# Patient Record
Sex: FEMALE | Race: WHITE | NOT HISPANIC OR LATINO | Employment: FULL TIME | URBAN - METROPOLITAN AREA
[De-identification: names, ages, dates, MRNs, and addresses within clinical notes are randomized per-mention and may not be internally consistent; named-entity substitution may affect disease eponyms.]

---

## 2017-05-30 ENCOUNTER — OFFICE VISIT (OUTPATIENT)
Dept: FAMILY MEDICINE CLINIC | Facility: CLINIC | Age: 42
End: 2017-05-30

## 2017-05-30 VITALS
HEART RATE: 84 BPM | DIASTOLIC BLOOD PRESSURE: 80 MMHG | OXYGEN SATURATION: 98 % | TEMPERATURE: 98.3 F | BODY MASS INDEX: 34.8 KG/M2 | SYSTOLIC BLOOD PRESSURE: 122 MMHG | HEIGHT: 69 IN | WEIGHT: 235 LBS

## 2017-05-30 DIAGNOSIS — F41.8 MIXED ANXIETY DEPRESSIVE DISORDER: Primary | ICD-10-CM

## 2017-05-30 DIAGNOSIS — R73.9 HYPERGLYCEMIA: ICD-10-CM

## 2017-05-30 PROCEDURE — 99213 OFFICE O/P EST LOW 20 MIN: CPT | Performed by: FAMILY MEDICINE

## 2017-05-30 RX ORDER — CITALOPRAM 40 MG/1
40 TABLET ORAL DAILY
Qty: 90 TABLET | Refills: 1 | Status: SHIPPED | OUTPATIENT
Start: 2017-05-30 | End: 2017-12-14 | Stop reason: SDUPTHER

## 2017-12-15 RX ORDER — CITALOPRAM 40 MG/1
TABLET ORAL
Qty: 30 TABLET | Refills: 0 | Status: SHIPPED | OUTPATIENT
Start: 2017-12-15 | End: 2017-12-19 | Stop reason: SDUPTHER

## 2017-12-19 ENCOUNTER — OFFICE VISIT (OUTPATIENT)
Dept: INTERNAL MEDICINE | Facility: CLINIC | Age: 42
End: 2017-12-19

## 2017-12-19 VITALS
HEIGHT: 69 IN | BODY MASS INDEX: 35.99 KG/M2 | WEIGHT: 243 LBS | OXYGEN SATURATION: 98 % | HEART RATE: 80 BPM | TEMPERATURE: 99 F | DIASTOLIC BLOOD PRESSURE: 80 MMHG | SYSTOLIC BLOOD PRESSURE: 118 MMHG

## 2017-12-19 DIAGNOSIS — R73.01 IFG (IMPAIRED FASTING GLUCOSE): ICD-10-CM

## 2017-12-19 DIAGNOSIS — J30.89 CHRONIC NON-SEASONAL ALLERGIC RHINITIS, UNSPECIFIED TRIGGER: ICD-10-CM

## 2017-12-19 DIAGNOSIS — F41.8 MIXED ANXIETY DEPRESSIVE DISORDER: ICD-10-CM

## 2017-12-19 DIAGNOSIS — E55.9 VITAMIN D DEFICIENCY: ICD-10-CM

## 2017-12-19 DIAGNOSIS — F41.8 MIXED ANXIETY DEPRESSIVE DISORDER: Primary | ICD-10-CM

## 2017-12-19 PROCEDURE — 99214 OFFICE O/P EST MOD 30 MIN: CPT | Performed by: FAMILY MEDICINE

## 2017-12-19 RX ORDER — MONTELUKAST SODIUM 10 MG/1
10 TABLET ORAL NIGHTLY
Qty: 90 TABLET | Refills: 1 | Status: SHIPPED | OUTPATIENT
Start: 2017-12-19 | End: 2018-08-14 | Stop reason: SDUPTHER

## 2017-12-19 RX ORDER — CITALOPRAM 40 MG/1
40 TABLET ORAL DAILY
Qty: 90 TABLET | Refills: 1 | Status: SHIPPED | OUTPATIENT
Start: 2017-12-19 | End: 2018-01-15 | Stop reason: SDUPTHER

## 2017-12-19 RX ORDER — FLUTICASONE PROPIONATE 50 MCG
2 SPRAY, SUSPENSION (ML) NASAL DAILY
Qty: 1 BOTTLE | Refills: 5 | Status: SHIPPED | OUTPATIENT
Start: 2017-12-19 | End: 2018-08-14 | Stop reason: SDUPTHER

## 2017-12-19 NOTE — PROGRESS NOTES
Subjective   Marcia Shi is a 42 y.o. female.   Chief Complaint   Patient presents with   • Depression   • Vitamin D Deficiency   • Hyperglycemia   • Allergic Rhinitis       History of Present Illness     #1 Depression with anxiety-on Celexa 40 mg a day.  Patient takes it everyday.  She has no side effects.  No suicidal ideations, no aggressive behaviors.  PHQ 9 at 6, NICKY 7 at 5.  We attempted weaning off 2 times in the last 3 years. Patient was not able to function.     #2 IFG- No regular exercise.  BMI 35.9.    #3 AR- patient is on Flonase and Zyrtec.  She takes it everyday as prescribed.  She reports no side effects.  No nosebleeds.  Prior to treatment of allergies she used to have at least 2 sinus infections a year. Symptoms are controlled most of the time, but over last few weeks she has postnasal drainage and nasal congestion on and off.     #4 vitamin D deficiency-patient is on vitamin D at 2000 units a day.  She takes it everyday.    The following portions of the patient's history were reviewed and updated as appropriate: allergies, current medications, past family history, past medical history, past social history, past surgical history and problem list.    Review of Systems   Constitutional: Negative.    HENT: Positive for postnasal drip and rhinorrhea.    Respiratory: Negative.    Cardiovascular: Negative.    Psychiatric/Behavioral: Negative.  Negative for suicidal ideas. The patient is not nervous/anxious.          Objective   Wt Readings from Last 3 Encounters:   12/19/17 110 kg (243 lb)   05/30/17 107 kg (235 lb)   12/12/16 108 kg (238 lb)      Vitals:    12/19/17 1321   BP: 118/80   Pulse: 80   Temp: 99 °F (37.2 °C)   SpO2: 98%     Temp Readings from Last 3 Encounters:   12/19/17 99 °F (37.2 °C)   05/30/17 98.3 °F (36.8 °C)   12/12/16 98.5 °F (36.9 °C)     BP Readings from Last 3 Encounters:   12/19/17 118/80   05/30/17 122/80   12/12/16 128/70     Pulse Readings from Last 3 Encounters:    12/19/17 80   05/30/17 84   12/12/16 81       Physical Exam   Constitutional: She is oriented to person, place, and time. She appears well-developed and well-nourished.   HENT:   Head: Normocephalic and atraumatic.   Right Ear: Tympanic membrane, external ear and ear canal normal.   Left Ear: Tympanic membrane, external ear and ear canal normal.   Nose: Rhinorrhea present.   Mouth/Throat: No posterior oropharyngeal erythema.   Neck: Neck supple. Carotid bruit is not present. No thyromegaly present.   Cardiovascular: Normal rate, regular rhythm and normal heart sounds.    Pulmonary/Chest: Effort normal and breath sounds normal.   Neurological: She is alert and oriented to person, place, and time.   Skin: Skin is warm, dry and intact.   Psychiatric: She has a normal mood and affect. Her behavior is normal.       Assessment/Plan   Marcia was seen today for depression, vitamin d deficiency, hyperglycemia and allergic rhinitis.    Diagnoses and all orders for this visit:    Mixed anxiety depressive disorder  -     Comprehensive Metabolic Panel; Future  -     Lipid Panel With LDL / HDL Ratio; Future  -     Thyroid Cascade Profile; Future  -     Vitamin D 25 Hydroxy; Future  -     Hemoglobin A1c; Future  -     CBC (No Diff); Future    Vitamin D deficiency  -     Comprehensive Metabolic Panel; Future  -     Lipid Panel With LDL / HDL Ratio; Future  -     Thyroid Cascade Profile; Future  -     Vitamin D 25 Hydroxy; Future  -     Hemoglobin A1c; Future  -     CBC (No Diff); Future    IFG (impaired fasting glucose)  -     Comprehensive Metabolic Panel; Future  -     Lipid Panel With LDL / HDL Ratio; Future  -     Thyroid Cascade Profile; Future  -     Vitamin D 25 Hydroxy; Future  -     Hemoglobin A1c; Future  -     CBC (No Diff); Future    Chronic non-seasonal allergic rhinitis, unspecified trigger    Other orders  -     fluticasone (FLONASE) 50 MCG/ACT nasal spray; 2 sprays into each nostril Daily.  -     citalopram  (CeleXA) 40 MG tablet; Take 1 tablet by mouth Daily.  -     montelukast (SINGULAIR) 10 MG tablet; Take 1 tablet by mouth Every Night.        #1 depression with anxiety-will continue current treatment.  Follow-up in 6 months, or sooner if problems.    #2 impaired fasting glucose-increased risk for developing diabetes.  Weight loss can help with decreasing risk.  We'll check A1c.  We talked about Weight Watchers and starting exercise at least 30 minutes a day, 5 days a week.  Patient will try to limit sweets in her diet.      #3 vitamin D deficiency-check levels.  Continue current treatment.  FOLLOW-up in 12 months.      #4 allergic rhinitis-uncontrolled.  We are adding singular 10 mg a day.  Follow-up in 6-12 months.

## 2017-12-21 LAB
25(OH)D3+25(OH)D2 SERPL-MCNC: 42.5 NG/ML (ref 30–100)
ALBUMIN SERPL-MCNC: 4.4 G/DL (ref 3.5–5.2)
ALBUMIN/GLOB SERPL: 1.5 G/DL
ALP SERPL-CCNC: 74 U/L (ref 39–117)
ALT SERPL-CCNC: 23 U/L (ref 1–33)
AST SERPL-CCNC: 22 U/L (ref 1–32)
BILIRUB SERPL-MCNC: 0.6 MG/DL (ref 0.1–1.2)
BUN SERPL-MCNC: 12 MG/DL (ref 6–20)
BUN/CREAT SERPL: 12.9 (ref 7–25)
CALCIUM SERPL-MCNC: 9.3 MG/DL (ref 8.6–10.5)
CHLORIDE SERPL-SCNC: 97 MMOL/L (ref 98–107)
CHOLEST SERPL-MCNC: 217 MG/DL (ref 0–200)
CO2 SERPL-SCNC: 30.2 MMOL/L (ref 22–29)
CREAT SERPL-MCNC: 0.93 MG/DL (ref 0.57–1)
ERYTHROCYTE [DISTWIDTH] IN BLOOD BY AUTOMATED COUNT: 14.1 % (ref 11.7–13)
GLOBULIN SER CALC-MCNC: 3 GM/DL
GLUCOSE SERPL-MCNC: 93 MG/DL (ref 65–99)
HBA1C MFR BLD: 5.48 % (ref 4.8–5.6)
HCT VFR BLD AUTO: 42.7 % (ref 35.6–45.5)
HDLC SERPL-MCNC: 49 MG/DL (ref 40–60)
HGB BLD-MCNC: 13.6 G/DL (ref 11.9–15.5)
LDLC SERPL CALC-MCNC: 139 MG/DL (ref 0–100)
LDLC/HDLC SERPL: 2.84 {RATIO}
MCH RBC QN AUTO: 29.6 PG (ref 26.9–32)
MCHC RBC AUTO-ENTMCNC: 31.9 G/DL (ref 32.4–36.3)
MCV RBC AUTO: 93 FL (ref 80.5–98.2)
PLATELET # BLD AUTO: 328 10*3/MM3 (ref 140–500)
POTASSIUM SERPL-SCNC: 4.6 MMOL/L (ref 3.5–5.2)
PROT SERPL-MCNC: 7.4 G/DL (ref 6–8.5)
RBC # BLD AUTO: 4.59 10*6/MM3 (ref 3.9–5.2)
SODIUM SERPL-SCNC: 137 MMOL/L (ref 136–145)
TRIGL SERPL-MCNC: 145 MG/DL (ref 0–150)
TSH SERPL DL<=0.005 MIU/L-ACNC: 2.15 UIU/ML (ref 0.45–4.5)
VLDLC SERPL CALC-MCNC: 29 MG/DL (ref 5–40)
WBC # BLD AUTO: 6.58 10*3/MM3 (ref 4.5–10.7)

## 2018-01-15 RX ORDER — CITALOPRAM 40 MG/1
40 TABLET ORAL DAILY
Qty: 90 TABLET | Refills: 1 | Status: SHIPPED | OUTPATIENT
Start: 2018-01-15 | End: 2018-08-14 | Stop reason: SDUPTHER

## 2018-08-14 ENCOUNTER — OFFICE VISIT (OUTPATIENT)
Dept: INTERNAL MEDICINE | Facility: CLINIC | Age: 43
End: 2018-08-14

## 2018-08-14 VITALS
HEART RATE: 82 BPM | OXYGEN SATURATION: 98 % | SYSTOLIC BLOOD PRESSURE: 120 MMHG | BODY MASS INDEX: 34.96 KG/M2 | WEIGHT: 236 LBS | HEIGHT: 69 IN | TEMPERATURE: 98 F | DIASTOLIC BLOOD PRESSURE: 80 MMHG

## 2018-08-14 DIAGNOSIS — E55.9 VITAMIN D DEFICIENCY: ICD-10-CM

## 2018-08-14 DIAGNOSIS — J30.89 CHRONIC NON-SEASONAL ALLERGIC RHINITIS, UNSPECIFIED TRIGGER: ICD-10-CM

## 2018-08-14 DIAGNOSIS — F41.8 MIXED ANXIETY DEPRESSIVE DISORDER: Primary | ICD-10-CM

## 2018-08-14 DIAGNOSIS — G47.09 OTHER INSOMNIA: ICD-10-CM

## 2018-08-14 PROCEDURE — 99214 OFFICE O/P EST MOD 30 MIN: CPT | Performed by: FAMILY MEDICINE

## 2018-08-14 RX ORDER — FLUTICASONE PROPIONATE 50 MCG
2 SPRAY, SUSPENSION (ML) NASAL DAILY
Qty: 1 BOTTLE | Refills: 5 | Status: SHIPPED | OUTPATIENT
Start: 2018-08-14 | End: 2019-02-08 | Stop reason: SDUPTHER

## 2018-08-14 RX ORDER — MONTELUKAST SODIUM 10 MG/1
10 TABLET ORAL NIGHTLY
Qty: 90 TABLET | Refills: 1 | Status: SHIPPED | OUTPATIENT
Start: 2018-08-14 | End: 2018-11-27

## 2018-08-14 RX ORDER — CITALOPRAM 40 MG/1
40 TABLET ORAL DAILY
Qty: 90 TABLET | Refills: 1 | Status: SHIPPED | OUTPATIENT
Start: 2018-08-14 | End: 2019-02-08 | Stop reason: SDUPTHER

## 2018-08-14 RX ORDER — TRAZODONE HYDROCHLORIDE 50 MG/1
50 TABLET ORAL NIGHTLY PRN
Qty: 90 TABLET | Refills: 1 | Status: SHIPPED | OUTPATIENT
Start: 2018-08-14 | End: 2018-11-19

## 2018-08-14 NOTE — PROGRESS NOTES
Subjective   Marcia Shi is a 43 y.o. female.   Chief Complaint   Patient presents with   • Depression   • Allergic Rhinitis   • Insomnia       History of Present Illness     #1 Depression with anxiety-on Celexa 40 mg a day.  Patient takes it everyday.  She has no side effects.  No suicidal ideations, no aggressive behaviors.  PHQ 9 at 5, NICKY 7 at 4.  We attempted weaning off 2 times in the last 3 years. Patient was not able to function.     #2 Insomnia -patient takes melatonin every night and it helps her to sleep.  She gets on average 8 hours of sleep but sometimes she wakes up tired in the mornings.  She feels that it doesn't work well.  She does not have interrupted sleep.  She would like to try trazodone.  Her mom was started on it recently and it works great for her.     #3 AR- patient is on Flonase, singular and Zyrtec.  She takes it everyday as prescribed.  She reports no side effects.  No nosebleeds.  Prior to treatment of allergies she used to have at least 2-3 sinus infections a year. Symptoms are controlled.         The following portions of the patient's history were reviewed and updated as appropriate: allergies, current medications, past family history, past medical history, past social history, past surgical history and problem list.    Review of Systems   Constitutional: Negative.    Respiratory: Negative.    Cardiovascular: Negative.    Psychiatric/Behavioral: Negative for suicidal ideas. The patient is not nervous/anxious.          Objective   Wt Readings from Last 3 Encounters:   08/14/18 107 kg (236 lb)   12/19/17 110 kg (243 lb)   05/30/17 107 kg (235 lb)      Vitals:    08/14/18 1008   BP: 120/80   Pulse: 82   Temp: 98 °F (36.7 °C)   SpO2: 98%     Temp Readings from Last 3 Encounters:   08/14/18 98 °F (36.7 °C)   12/19/17 99 °F (37.2 °C)   05/30/17 98.3 °F (36.8 °C)     BP Readings from Last 3 Encounters:   08/14/18 120/80   12/19/17 118/80   05/30/17 122/80     Pulse Readings from Last  3 Encounters:   08/14/18 82   12/19/17 80   05/30/17 84       Physical Exam   Constitutional: She is oriented to person, place, and time. She appears well-developed and well-nourished.   HENT:   Head: Normocephalic and atraumatic.   Neck: Neck supple. Carotid bruit is not present. No thyromegaly present.   Cardiovascular: Normal rate, regular rhythm and normal heart sounds.    Pulmonary/Chest: Effort normal and breath sounds normal.   Neurological: She is alert and oriented to person, place, and time.   Skin: Skin is warm, dry and intact.   Psychiatric: She has a normal mood and affect. Her behavior is normal.       Assessment/Plan   Marcia was seen today for depression, allergic rhinitis and insomnia.    Diagnoses and all orders for this visit:    Mixed anxiety depressive disorder    Vitamin D deficiency    Chronic non-seasonal allergic rhinitis, unspecified trigger    Other insomnia    Other orders  -     montelukast (SINGULAIR) 10 MG tablet; Take 1 tablet by mouth Every Night.  -     fluticasone (FLONASE) 50 MCG/ACT nasal spray; 2 sprays into the nostril(s) as directed by provider Daily.  -     citalopram (CeleXA) 40 MG tablet; Take 1 tablet by mouth Daily.  -     traZODone (DESYREL) 50 MG tablet; Take 1 tablet by mouth At Night As Needed for Sleep.        #1 anxiety/depression-controlled.  Continue current treatment.  Follow-up in 6 months.     #2 insomnia-we talked about sleep study, but patient prefers not to do it at this time.  She would like to try trazodone first.  Will send Trazodone at 50 mg at bedtime to be used as needed.  Patient will start with half tablet.  Follow-up in 6 months, or sooner if problems.      #3 allergic rhinitis-continue same.  Follow-up in 6-12 months.    She is overdue for Pap smear and mammogram is advised to schedule it with her GYN.

## 2018-11-14 LAB
25(OH)D3+25(OH)D2 SERPL-MCNC: 49.9 NG/ML (ref 30–100)
ALBUMIN SERPL-MCNC: 4.4 G/DL (ref 3.5–5.2)
ALBUMIN/GLOB SERPL: 1.6 G/DL
ALP SERPL-CCNC: 62 U/L (ref 39–117)
ALT SERPL-CCNC: 19 U/L (ref 1–33)
AST SERPL-CCNC: 14 U/L (ref 1–32)
BILIRUB SERPL-MCNC: 0.3 MG/DL (ref 0.1–1.2)
BUN SERPL-MCNC: 12 MG/DL (ref 6–20)
BUN/CREAT SERPL: 14.3 (ref 7–25)
CALCIUM SERPL-MCNC: 9.4 MG/DL (ref 8.6–10.5)
CHLORIDE SERPL-SCNC: 100 MMOL/L (ref 98–107)
CHOLEST SERPL-MCNC: 199 MG/DL (ref 0–200)
CO2 SERPL-SCNC: 23.4 MMOL/L (ref 22–29)
CREAT SERPL-MCNC: 0.84 MG/DL (ref 0.57–1)
ERYTHROCYTE [DISTWIDTH] IN BLOOD BY AUTOMATED COUNT: 13.9 % (ref 11.7–13)
GLOBULIN SER CALC-MCNC: 2.7 GM/DL
GLUCOSE SERPL-MCNC: 104 MG/DL (ref 65–99)
HCT VFR BLD AUTO: 41.5 % (ref 35.6–45.5)
HDLC SERPL-MCNC: 47 MG/DL (ref 40–60)
HGB BLD-MCNC: 13.1 G/DL (ref 11.9–15.5)
LDLC SERPL CALC-MCNC: 123 MG/DL (ref 0–100)
LDLC/HDLC SERPL: 2.63 {RATIO}
MCH RBC QN AUTO: 29 PG (ref 26.9–32)
MCHC RBC AUTO-ENTMCNC: 31.6 G/DL (ref 32.4–36.3)
MCV RBC AUTO: 92 FL (ref 80.5–98.2)
PLATELET # BLD AUTO: 311 10*3/MM3 (ref 140–500)
POTASSIUM SERPL-SCNC: 4.3 MMOL/L (ref 3.5–5.2)
PROT SERPL-MCNC: 7.1 G/DL (ref 6–8.5)
RBC # BLD AUTO: 4.51 10*6/MM3 (ref 3.9–5.2)
SODIUM SERPL-SCNC: 139 MMOL/L (ref 136–145)
TRIGL SERPL-MCNC: 143 MG/DL (ref 0–150)
TSH SERPL DL<=0.005 MIU/L-ACNC: 2.62 UIU/ML (ref 0.45–4.5)
VLDLC SERPL CALC-MCNC: 28.6 MG/DL (ref 5–40)
WBC # BLD AUTO: 6.18 10*3/MM3 (ref 4.5–10.7)

## 2018-11-19 ENCOUNTER — OFFICE VISIT (OUTPATIENT)
Dept: INTERNAL MEDICINE | Facility: CLINIC | Age: 43
End: 2018-11-19

## 2018-11-19 VITALS
OXYGEN SATURATION: 98 % | WEIGHT: 227 LBS | TEMPERATURE: 98.3 F | HEART RATE: 73 BPM | SYSTOLIC BLOOD PRESSURE: 118 MMHG | BODY MASS INDEX: 33.62 KG/M2 | DIASTOLIC BLOOD PRESSURE: 60 MMHG | HEIGHT: 69 IN

## 2018-11-19 DIAGNOSIS — Z00.00 PHYSICAL EXAM, ANNUAL: Primary | ICD-10-CM

## 2018-11-19 PROCEDURE — 90674 CCIIV4 VAC NO PRSV 0.5 ML IM: CPT | Performed by: FAMILY MEDICINE

## 2018-11-19 PROCEDURE — 90471 IMMUNIZATION ADMIN: CPT | Performed by: FAMILY MEDICINE

## 2018-11-19 PROCEDURE — 99396 PREV VISIT EST AGE 40-64: CPT | Performed by: FAMILY MEDICINE

## 2018-11-19 NOTE — PROGRESS NOTES
Subjective   Marcia Shi is a 43 y.o. female.   Chief Complaint   Patient presents with   • Annual Exam       History of Present Illness     #1 CPE-she is here for complete physical exam without GYN evaluation.  She is scheduled with GYN next week.   She has no complains.  There is no change in medical, surgical and family history from last office visit.  At last office visit we start trazodone 50 mg at bedtime to help with insomnia, but it didn't work.  The patient finds that melatonin is working much better.  She takes 5 mg of melatonin at bedtime.  She gets on average 8 hours of sleep.  Over-the-counter she takes vitamin D 3 at 2000 units a day.  No change.  She is not allergic to any medications.  She doesn't smoke cigarettes.  Alcohol a few times a year.  No drugs.  She exercises 3 times a week.  She goes for walks for at least an hour.  She improved her diet.  She eats no sweets during the week.  She lost 16 pounds.  Dental appointments every 6 months.  Last eye exam within a year.  Patient got reading glasses.  She had tetanus vaccine in 2010.  She is getting flu vaccine today.    The following portions of the patient's history were reviewed and updated as appropriate: allergies, current medications, past family history, past medical history, past social history, past surgical history and problem list.    Review of Systems   Constitutional: Negative.    HENT: Negative.    Respiratory: Negative.    Cardiovascular: Negative.    Gastrointestinal: Negative.    Neurological: Negative.    Psychiatric/Behavioral: Negative.          Objective   Wt Readings from Last 3 Encounters:   11/19/18 103 kg (227 lb)   08/14/18 107 kg (236 lb)   12/19/17 110 kg (243 lb)      Vitals:    11/19/18 0803   BP: 118/60   Pulse: 73   Temp: 98.3 °F (36.8 °C)   SpO2: 98%     Temp Readings from Last 3 Encounters:   11/19/18 98.3 °F (36.8 °C)   08/14/18 98 °F (36.7 °C)   12/19/17 99 °F (37.2 °C)     BP Readings from Last 3  Encounters:   11/19/18 118/60   08/14/18 120/80   12/19/17 118/80     Pulse Readings from Last 3 Encounters:   11/19/18 73   08/14/18 82   12/19/17 80       Physical Exam   Constitutional: She is oriented to person, place, and time. She appears well-developed and well-nourished. No distress.   HENT:   Head: Normocephalic and atraumatic. Hair is normal.   Right Ear: Hearing, tympanic membrane, external ear and ear canal normal. No drainage. No decreased hearing is noted.   Left Ear: Hearing, tympanic membrane, external ear and ear canal normal. No decreased hearing is noted.   Nose: No nasal deformity.   Mouth/Throat: Oropharynx is clear and moist.   Eyes: Conjunctivae, EOM and lids are normal. Pupils are equal, round, and reactive to light. Right eye exhibits no discharge. Left eye exhibits no discharge.   Neck: Normal range of motion. Neck supple. No JVD present. No tracheal deviation present. No thyromegaly present.   Cardiovascular: Normal rate, regular rhythm, normal heart sounds, intact distal pulses and normal pulses. Exam reveals no gallop and no friction rub.   No murmur heard.  Pulmonary/Chest: Effort normal and breath sounds normal. No respiratory distress. She has no wheezes. She has no rales. She exhibits no tenderness.   Abdominal: Soft. She exhibits no distension and no mass. There is no tenderness. There is no rebound and no guarding. No hernia.   Musculoskeletal: Normal range of motion. She exhibits no edema, tenderness or deformity.   Lymphadenopathy:     She has no cervical adenopathy.   Neurological: She is alert and oriented to person, place, and time. She has normal reflexes. She displays normal reflexes. No cranial nerve deficit. She exhibits normal muscle tone. Coordination normal.   Skin: Skin is warm and dry. No rash noted. She is not diaphoretic. No erythema.   Psychiatric: She has a normal mood and affect. Her behavior is normal. Judgment and thought content normal.   Vitals  reviewed.      Assessment/Plan   Marcia was seen today for annual exam.    Diagnoses and all orders for this visit:    Physical exam, annual        #1 CPE- patient is due for GYN evaluation and mammogram and she is scheduled next week.  She is up-to-date with vaccinations.  Blood work results reviewed with patient.  Fasting blood sugar 104.  Weight loss can decrease risk of developing diabetes.  Good job with dietary changes and regular walking.  Continue to work on it.

## 2018-11-27 ENCOUNTER — OFFICE VISIT (OUTPATIENT)
Dept: OBSTETRICS AND GYNECOLOGY | Facility: CLINIC | Age: 43
End: 2018-11-27

## 2018-11-27 VITALS
SYSTOLIC BLOOD PRESSURE: 126 MMHG | BODY MASS INDEX: 33.92 KG/M2 | HEART RATE: 90 BPM | HEIGHT: 69 IN | DIASTOLIC BLOOD PRESSURE: 75 MMHG | WEIGHT: 229 LBS

## 2018-11-27 DIAGNOSIS — Z01.419 ENCOUNTER FOR WELL WOMAN EXAM WITH ROUTINE GYNECOLOGICAL EXAM: Primary | ICD-10-CM

## 2018-11-27 PROCEDURE — 99386 PREV VISIT NEW AGE 40-64: CPT | Performed by: OBSTETRICS & GYNECOLOGY

## 2018-11-27 NOTE — PATIENT INSTRUCTIONS
Mammogram  If you received a mammogram recently, you should be called within 14 days of the appointment to discuss the results.  We contact all patients via phone if possible.  If we cannot reach you via phone, we will send a letter.  If you have not heard from us in 14 days from your appointment, please call us at [phone] or [phone] to get your results.      Pap smear  We will contact you with your pap smear results within 7 to 10 days.  If you have not heard your pap smear results within 14 days, please contact us at [phone] or [phone].    DXA (Bone density testing)  If you received a bone density test from our office or from another office and asked it to be sent to us, please note that we will contact you within 20 days of that imaging.  If you have not been contacted within 20 days, please contact us at [phone] or [phone].    All Laboratory tests  We want to make sure everyone receives their results and understands them.  If you have lab testing this visit, please contact us if you have not heard your results in 14 days at [phone] or [phone].  If you have MyChart and do not understand the results, please feel free to contact us, as well.      Requested medical records  If we have requested medical records, we will review them as soon as they are received, however we would recommend you call us at [phone] or [phone] in 14 days if you have not heard that your records have been received.

## 2018-11-27 NOTE — PROGRESS NOTES
SUBJECTIVE:   Chief Complaint   Patient presents with   • Annual Exam     no pap record.         Marcia Shi is a 43 y.o.  who presents for an annual examination     Pap history:  Last pap: Unsure  Prior abnormal paps: no  STDs  Sexually active: yes  History of STDs: no  Contraception:  None  Regular periods, 6 days of bleeding every 28 days, 2 days of heavy (changing a pad or tampon every 2 hours), after lighter  Had mammogram about 4 years ago (unsure where) with biopsy that was normal.    History of physical abuse: no, History of sexual abuse: no and History of verbal/emotional abuse: no    Screening for BRCA-   Is patient's family history significant for BRCA risk factors? no    Past Medical History:   Diagnosis Date   • Ganglion cyst of wrist    • UTI (urinary tract infection)    • Vitamin D deficiency      Past Surgical History:   Procedure Laterality Date   •  SECTION     • TONSILLECTOMY       OB History    Para Term  AB Living   1 1 1     1   SAB TAB Ectopic Molar Multiple Live Births             1      # Outcome Date GA Lbr José Miguel/2nd Weight Sex Delivery Anes PTL Lv   1 Term     F CS-LTranv   KASSIDY         Social History     Tobacco Use   • Smoking status: Former Smoker   • Smokeless tobacco: Never Used   • Tobacco comment: quit 15 yrs ago   Substance Use Topics   • Alcohol use: Yes     Comment: mildly   • Drug use: No     Family History   Problem Relation Age of Onset   • Hypertension Mother    • Colon cancer Father 70   • Lymphoma Father    • Breast cancer Neg Hx    • Ovarian cancer Neg Hx    • Uterine cancer Neg Hx    • Deep vein thrombosis Neg Hx    • Pulmonary embolism Neg Hx      Current Outpatient Medications on File Prior to Visit   Medication Sig Dispense Refill   • cetirizine (ZyrTEC) 10 MG tablet Take 10 mg by mouth daily.     • cholecalciferol (VITAMIN D3) 1000 UNITS tablet Take by mouth.     • citalopram (CeleXA) 40 MG tablet Take 1 tablet by mouth Daily. 90 tablet  "1   • CRANBERRY PO Take by mouth.     • fluticasone (FLONASE) 50 MCG/ACT nasal spray 2 sprays into the nostril(s) as directed by provider Daily. 1 bottle 5   • Melatonin 5 MG tablet dispersible Take by mouth.     • Multiple Vitamin (MULTIVITAMIN) capsule Take by mouth.     • [DISCONTINUED] montelukast (SINGULAIR) 10 MG tablet Take 1 tablet by mouth Every Night. 90 tablet 1     No current facility-administered medications on file prior to visit.      No Known Allergies     Review of Systems   Constitutional: Negative for chills, fever and unexpected weight change.   HENT: Negative for congestion, nosebleeds and sore throat.    Eyes: Negative for visual disturbance.   Respiratory: Negative for cough, chest tightness and shortness of breath.    Cardiovascular: Negative for chest pain and palpitations.   Gastrointestinal: Negative for abdominal pain, constipation, diarrhea, nausea and vomiting.   Endocrine: Negative for polyuria.   Genitourinary: Negative for difficulty urinating, dyspareunia, dysuria, frequency, genital sores, hematuria, menstrual problem, pelvic pain, urgency, vaginal bleeding, vaginal discharge and vaginal pain.   Musculoskeletal: Negative for arthralgias, joint swelling and myalgias.   Skin: Negative for color change and rash.   Neurological: Negative for dizziness, light-headedness and headaches.   Hematological: Does not bruise/bleed easily.   Psychiatric/Behavioral: Negative for dysphoric mood. The patient is not nervous/anxious.          OBJECTIVE:   Vitals:    11/27/18 0943   BP: 126/75   Pulse: 90   Weight: 104 kg (229 lb)   Height: 175.3 cm (69.02\")      Physical Exam   Constitutional: She is oriented to person, place, and time. She appears well-developed and well-nourished. No distress.   HENT:   Head: Normocephalic and atraumatic.   Eyes: EOM are normal. No scleral icterus.   Neck: Normal range of motion. Neck supple. No thyromegaly present.   Cardiovascular: Normal rate and regular rhythm. " Exam reveals no gallop and no friction rub.   No murmur heard.  Pulmonary/Chest: Effort normal and breath sounds normal. No respiratory distress. She has no wheezes. She has no rales. She exhibits no tenderness. Right breast exhibits no inverted nipple. Left breast exhibits no inverted nipple. Breasts are symmetrical. There is no breast swelling.   Abdominal: Soft. Bowel sounds are normal. She exhibits no distension. There is no tenderness. There is no guarding.   Genitourinary: Vagina normal and uterus normal. There is no rash, tenderness, lesion, injury or Bartholin's cyst on the right labia. There is no rash, tenderness, lesion, injury or Bartholin's cyst on the left labia. Uterus is not mobile.   Cervix is not parous. Cervix does not exhibit motion tenderness, discharge, friability, lesion, polyp, nabothian cyst, eversion, pinkness or cyanosis. Right adnexum displays no mass, no tenderness and no fullness. Right adnexum is present.Left adnexum displays no mass, no tenderness and no fullness. Left adnexum is present.No vaginal discharge found.   Musculoskeletal: She exhibits no edema or deformity.   Neurological: She is alert and oriented to person, place, and time.   Skin: Skin is warm and dry. She is not diaphoretic.   Hemangioma on left lower abdomen near mons   Psychiatric: She has a normal mood and affect. Her speech is normal and behavior is normal. Judgment and thought content normal. Cognition and memory are normal.       ASSESSMENT/PLAN:   1. Encounter for well woman exam with routine gynecological exam    - IGP, Apt HPV,rfx 16 / 18,45      Well woman counseling/screening:    Cervical cancer screening:    Reports no h/o cervical dysplasia   The patient is due for a pap today.    Screening guidelines discussed with patient  Breast cancer screening:    Clinical breast exam recommended for age 20-39 years every 1-3 years   Mammogram recommended starting age 40   Breast self awareness encouraged   Recommend  mammogram to be scheduled and call for results if she has not heard in 14 days  STD Screening   Patient desired no testing.    Contraception :   Declines    Family history    does not demonstrate need for genetics referral   Healthy lifestyle counseling:   return for routine annual checkups   Encouraged continued smoking cessation

## 2018-11-30 LAB
CYTOLOGIST CVX/VAG CYTO: NORMAL
CYTOLOGY CVX/VAG DOC THIN PREP: NORMAL
DX ICD CODE: NORMAL
HIV 1 & 2 AB SER-IMP: NORMAL
HPV I/H RISK 4 DNA CVX QL PROBE+SIG AMP: NEGATIVE
OTHER STN SPEC: NORMAL
PATH REPORT.FINAL DX SPEC: NORMAL
STAT OF ADQ CVX/VAG CYTO-IMP: NORMAL

## 2018-12-04 ENCOUNTER — PROCEDURE VISIT (OUTPATIENT)
Dept: OBSTETRICS AND GYNECOLOGY | Facility: CLINIC | Age: 43
End: 2018-12-04

## 2018-12-04 ENCOUNTER — APPOINTMENT (OUTPATIENT)
Dept: WOMENS IMAGING | Facility: HOSPITAL | Age: 43
End: 2018-12-04

## 2018-12-04 DIAGNOSIS — Z12.31 VISIT FOR SCREENING MAMMOGRAM: Primary | ICD-10-CM

## 2018-12-04 DIAGNOSIS — Z01.419 ENCOUNTER FOR WELL WOMAN EXAM WITH ROUTINE GYNECOLOGICAL EXAM: ICD-10-CM

## 2018-12-04 PROCEDURE — 77067 SCR MAMMO BI INCL CAD: CPT | Performed by: OBSTETRICS & GYNECOLOGY

## 2018-12-04 PROCEDURE — 77067 SCR MAMMO BI INCL CAD: CPT | Performed by: RADIOLOGY

## 2018-12-05 ENCOUNTER — TELEPHONE (OUTPATIENT)
Dept: OBSTETRICS AND GYNECOLOGY | Facility: CLINIC | Age: 43
End: 2018-12-05

## 2018-12-05 NOTE — TELEPHONE ENCOUNTER
----- Message from Imani Mason MD sent at 11/30/2018  5:48 PM EST -----  Please contact patient and let her know that her pap smear was normal and HPV testing was negative. Thanks!    12/05/18  -Called patient to inform results, no answer. Left a message to return call.    2nd attempt  Called patient to inform results, no answer. Left a message to return call.    Letter with normal pap results was mailed to patient.

## 2018-12-07 NOTE — TELEPHONE ENCOUNTER
Please let patient know that mammogram was also normal.  Breast tissue is heterogeneously dense which may obscure small masses.  Recommend follow-up mammogram in 1 year.

## 2019-02-08 ENCOUNTER — OFFICE VISIT (OUTPATIENT)
Dept: INTERNAL MEDICINE | Facility: CLINIC | Age: 44
End: 2019-02-08

## 2019-02-08 VITALS
DIASTOLIC BLOOD PRESSURE: 80 MMHG | SYSTOLIC BLOOD PRESSURE: 116 MMHG | TEMPERATURE: 98 F | HEIGHT: 69 IN | OXYGEN SATURATION: 97 % | WEIGHT: 230 LBS | BODY MASS INDEX: 34.07 KG/M2 | HEART RATE: 80 BPM

## 2019-02-08 DIAGNOSIS — F41.8 MIXED ANXIETY DEPRESSIVE DISORDER: Primary | ICD-10-CM

## 2019-02-08 DIAGNOSIS — J30.89 NON-SEASONAL ALLERGIC RHINITIS, UNSPECIFIED TRIGGER: ICD-10-CM

## 2019-02-08 PROCEDURE — 99213 OFFICE O/P EST LOW 20 MIN: CPT | Performed by: FAMILY MEDICINE

## 2019-02-08 RX ORDER — CITALOPRAM 40 MG/1
40 TABLET ORAL DAILY
Qty: 90 TABLET | Refills: 1 | Status: SHIPPED | OUTPATIENT
Start: 2019-02-08 | End: 2019-07-29 | Stop reason: SDUPTHER

## 2019-02-08 RX ORDER — FLUTICASONE PROPIONATE 50 MCG
2 SPRAY, SUSPENSION (ML) NASAL DAILY
Qty: 1 BOTTLE | Refills: 5 | Status: SHIPPED | OUTPATIENT
Start: 2019-02-08 | End: 2019-08-28 | Stop reason: SDUPTHER

## 2019-02-08 NOTE — PROGRESS NOTES
Subjective   Marcia Shi is a 44 y.o. female.   Chief Complaint   Patient presents with   • Allergic Rhinitis   • Depression       History of Present Illness     #1 Depression with anxiety-on Celexa 40 mg a day.  Patient takes it everyday.  She has no side effects. Mood is normal.  No excessive worries.  No suicidal ideations, no aggressive behaviors.  PHQ 9 at 2 NICKY 7 at 3.  We attempted weaning off 2 times in the last 3 years. Patient was not able to function.    #2 AR- patient is on Flonase and Zyrtec.  She takes it everyday as prescribed.  She reports no side effects.  No nosebleeds.  Symptoms are controlled.   Prior to treatment of allergies she used to have at least 2-3 sinus infections a year.       The following portions of the patient's history were reviewed and updated as appropriate: allergies, current medications, past family history, past medical history, past social history, past surgical history and problem list.    Review of Systems   Constitutional: Negative.    HENT: Negative for congestion and rhinorrhea.    Respiratory: Negative.    Cardiovascular: Negative.    Psychiatric/Behavioral: Negative for suicidal ideas.         Objective   Wt Readings from Last 3 Encounters:   02/08/19 104 kg (230 lb)   11/27/18 104 kg (229 lb)   11/19/18 103 kg (227 lb)      Vitals:    02/08/19 0845   BP: 116/80   Pulse: 80   Temp: 98 °F (36.7 °C)   SpO2: 97%     Temp Readings from Last 3 Encounters:   02/08/19 98 °F (36.7 °C)   11/19/18 98.3 °F (36.8 °C)   08/14/18 98 °F (36.7 °C)     BP Readings from Last 3 Encounters:   02/08/19 116/80   11/27/18 126/75   11/19/18 118/60     Pulse Readings from Last 3 Encounters:   02/08/19 80   11/27/18 90   11/19/18 73       Physical Exam   Constitutional: She is oriented to person, place, and time. She appears well-developed and well-nourished.   HENT:   Head: Normocephalic and atraumatic.   Neck: Neck supple. Carotid bruit is not present. No thyromegaly present.    Cardiovascular: Normal rate, regular rhythm and normal heart sounds.   Pulmonary/Chest: Effort normal and breath sounds normal.   Neurological: She is alert and oriented to person, place, and time.   Skin: Skin is warm, dry and intact.   Psychiatric: She has a normal mood and affect. Her behavior is normal.       Assessment/Plan   Marcia was seen today for allergic rhinitis and depression.    Diagnoses and all orders for this visit:    Mixed anxiety depressive disorder    Non-seasonal allergic rhinitis, unspecified trigger    Other orders  -     fluticasone (FLONASE) 50 MCG/ACT nasal spray; 2 sprays into the nostril(s) as directed by provider Daily.  -     citalopram (CeleXA) 40 MG tablet; Take 1 tablet by mouth Daily.        #1Depression/anxiety-controlled.  Continue same.  Follow-up in 6 months, or sooner if problems.    #2 allergic rhinitis-controlled.  Continue same.  Follow-up in 6-12 months.

## 2019-02-14 ENCOUNTER — RESULTS ENCOUNTER (OUTPATIENT)
Dept: INTERNAL MEDICINE | Facility: CLINIC | Age: 44
End: 2019-02-14

## 2019-02-14 DIAGNOSIS — F41.8 MIXED ANXIETY DEPRESSIVE DISORDER: ICD-10-CM

## 2019-02-14 DIAGNOSIS — E55.9 VITAMIN D DEFICIENCY: ICD-10-CM

## 2019-02-14 DIAGNOSIS — G47.09 OTHER INSOMNIA: ICD-10-CM

## 2019-04-11 RX ORDER — MONTELUKAST SODIUM 10 MG/1
10 TABLET ORAL NIGHTLY
Qty: 90 TABLET | Refills: 1 | Status: SHIPPED | OUTPATIENT
Start: 2019-04-11 | End: 2019-08-28 | Stop reason: SDUPTHER

## 2019-07-29 RX ORDER — CITALOPRAM 40 MG/1
40 TABLET ORAL DAILY
Qty: 90 TABLET | Refills: 0 | Status: SHIPPED | OUTPATIENT
Start: 2019-07-29 | End: 2019-08-28 | Stop reason: SDUPTHER

## 2019-08-28 ENCOUNTER — OFFICE VISIT (OUTPATIENT)
Dept: INTERNAL MEDICINE | Facility: CLINIC | Age: 44
End: 2019-08-28

## 2019-08-28 VITALS
WEIGHT: 240 LBS | TEMPERATURE: 98.4 F | OXYGEN SATURATION: 97 % | BODY MASS INDEX: 35.55 KG/M2 | DIASTOLIC BLOOD PRESSURE: 60 MMHG | HEART RATE: 82 BPM | HEIGHT: 69 IN | SYSTOLIC BLOOD PRESSURE: 110 MMHG

## 2019-08-28 DIAGNOSIS — G47.09 OTHER INSOMNIA: ICD-10-CM

## 2019-08-28 DIAGNOSIS — F41.9 ANXIETY AND DEPRESSION: Primary | ICD-10-CM

## 2019-08-28 DIAGNOSIS — J30.89 NON-SEASONAL ALLERGIC RHINITIS, UNSPECIFIED TRIGGER: ICD-10-CM

## 2019-08-28 DIAGNOSIS — F32.A ANXIETY AND DEPRESSION: Primary | ICD-10-CM

## 2019-08-28 PROCEDURE — 99213 OFFICE O/P EST LOW 20 MIN: CPT | Performed by: FAMILY MEDICINE

## 2019-08-28 RX ORDER — FLUTICASONE PROPIONATE 50 MCG
2 SPRAY, SUSPENSION (ML) NASAL DAILY
Qty: 1 BOTTLE | Refills: 5 | Status: SHIPPED | OUTPATIENT
Start: 2019-08-28 | End: 2020-11-25 | Stop reason: SDUPTHER

## 2019-08-28 RX ORDER — MONTELUKAST SODIUM 10 MG/1
10 TABLET ORAL NIGHTLY
Qty: 90 TABLET | Refills: 1 | Status: SHIPPED | OUTPATIENT
Start: 2019-08-28 | End: 2020-03-18

## 2019-08-28 RX ORDER — TRAZODONE HYDROCHLORIDE 50 MG/1
50 TABLET ORAL NIGHTLY
Qty: 90 TABLET | Refills: 1 | Status: SHIPPED | OUTPATIENT
Start: 2019-08-28 | End: 2020-05-04

## 2019-08-28 RX ORDER — CITALOPRAM 40 MG/1
40 TABLET ORAL DAILY
Qty: 90 TABLET | Refills: 1 | Status: SHIPPED | OUTPATIENT
Start: 2019-08-28 | End: 2020-03-20 | Stop reason: SDUPTHER

## 2019-11-16 LAB
25(OH)D3+25(OH)D2 SERPL-MCNC: 39.4 NG/ML (ref 30–100)
ALBUMIN SERPL-MCNC: 4.6 G/DL (ref 3.5–5.2)
ALBUMIN/GLOB SERPL: 1.8 G/DL
ALP SERPL-CCNC: 63 U/L (ref 39–117)
ALT SERPL-CCNC: 21 U/L (ref 1–33)
AST SERPL-CCNC: 21 U/L (ref 1–32)
BILIRUB SERPL-MCNC: 0.5 MG/DL (ref 0.2–1.2)
BUN SERPL-MCNC: 9 MG/DL (ref 6–20)
BUN/CREAT SERPL: 11.7 (ref 7–25)
CALCIUM SERPL-MCNC: 9.2 MG/DL (ref 8.6–10.5)
CHLORIDE SERPL-SCNC: 103 MMOL/L (ref 98–107)
CHOLEST SERPL-MCNC: 204 MG/DL (ref 0–200)
CO2 SERPL-SCNC: 22.7 MMOL/L (ref 22–29)
CREAT SERPL-MCNC: 0.77 MG/DL (ref 0.57–1)
ERYTHROCYTE [DISTWIDTH] IN BLOOD BY AUTOMATED COUNT: 12.7 % (ref 12.3–15.4)
GLOBULIN SER CALC-MCNC: 2.6 GM/DL
GLUCOSE SERPL-MCNC: 103 MG/DL (ref 65–99)
HCT VFR BLD AUTO: 38.3 % (ref 34–46.6)
HDLC SERPL-MCNC: 51 MG/DL (ref 40–60)
HGB BLD-MCNC: 13 G/DL (ref 12–15.9)
LDLC SERPL CALC-MCNC: 134 MG/DL (ref 0–100)
LDLC/HDLC SERPL: 2.64 {RATIO}
MCH RBC QN AUTO: 30.3 PG (ref 26.6–33)
MCHC RBC AUTO-ENTMCNC: 33.9 G/DL (ref 31.5–35.7)
MCV RBC AUTO: 89.3 FL (ref 79–97)
PLATELET # BLD AUTO: 311 10*3/MM3 (ref 140–450)
POTASSIUM SERPL-SCNC: 4.3 MMOL/L (ref 3.5–5.2)
PROT SERPL-MCNC: 7.2 G/DL (ref 6–8.5)
RBC # BLD AUTO: 4.29 10*6/MM3 (ref 3.77–5.28)
SODIUM SERPL-SCNC: 142 MMOL/L (ref 136–145)
TRIGL SERPL-MCNC: 93 MG/DL (ref 0–150)
TSH SERPL DL<=0.005 MIU/L-ACNC: 1.51 UIU/ML (ref 0.45–4.5)
VLDLC SERPL CALC-MCNC: 18.6 MG/DL (ref 5–40)
WBC # BLD AUTO: 7.21 10*3/MM3 (ref 3.4–10.8)

## 2019-11-22 ENCOUNTER — OFFICE VISIT (OUTPATIENT)
Dept: INTERNAL MEDICINE | Facility: CLINIC | Age: 44
End: 2019-11-22

## 2019-11-22 VITALS
WEIGHT: 234 LBS | TEMPERATURE: 98 F | HEIGHT: 69 IN | DIASTOLIC BLOOD PRESSURE: 72 MMHG | HEART RATE: 78 BPM | OXYGEN SATURATION: 97 % | BODY MASS INDEX: 34.66 KG/M2 | SYSTOLIC BLOOD PRESSURE: 120 MMHG

## 2019-11-22 DIAGNOSIS — Z00.00 PHYSICAL EXAM, ANNUAL: Primary | ICD-10-CM

## 2019-11-22 PROCEDURE — 99396 PREV VISIT EST AGE 40-64: CPT | Performed by: FAMILY MEDICINE

## 2019-11-22 PROCEDURE — 90471 IMMUNIZATION ADMIN: CPT | Performed by: FAMILY MEDICINE

## 2019-11-22 PROCEDURE — 90674 CCIIV4 VAC NO PRSV 0.5 ML IM: CPT | Performed by: FAMILY MEDICINE

## 2019-11-22 NOTE — PROGRESS NOTES
Subjective   Marcia Shi is a 44 y.o. female.   Chief Complaint   Patient presents with   • Annual Exam       History of Present Illness     #1 CPE- pt is here for complete physical exam without GYN evaluation.  She has no complaints.  There is no change in medical or surgical history from last office visit.  No change in medications including over-the-counter medications.  She is not allergic to medications.  Her mom was diagnosed with advanced endometrial cancer.  She is expecting chemotherapy.  Patient does not smoke cigarettes.  She drinks alcohol few times a year.  No drugs.  For exercise she walks twice a week for about an hour.  She has dental appointments every 6 months.  Her vision is normal.  She had GYN evaluation that included Pap smear, breast exam and mammogram in 11/2018.    Vitamin D is at 39.4.  Patient takes vitamin D3 2000 units a day.  Fasting blood sugar 103.  BMI 34.5.  Patient lost 6 pounds from last office visit by decreasing amount of sweets and eating more vegetables.      The following portions of the patient's history were reviewed and updated as appropriate: allergies, current medications, past family history, past medical history, past social history, past surgical history and problem list.    Review of Systems   Constitutional: Negative.    Respiratory: Negative.    Cardiovascular: Negative.    Gastrointestinal: Negative.    Genitourinary: Negative.    Neurological: Negative.    Psychiatric/Behavioral: Negative for suicidal ideas. The patient is nervous/anxious.          Objective   Wt Readings from Last 3 Encounters:   11/22/19 106 kg (234 lb)   08/28/19 109 kg (240 lb)   02/08/19 104 kg (230 lb)      Vitals:    11/22/19 0800   BP: 120/72   Pulse: 78   Temp: 98 °F (36.7 °C)   SpO2: 97%     Temp Readings from Last 3 Encounters:   11/22/19 98 °F (36.7 °C)   08/28/19 98.4 °F (36.9 °C)   02/08/19 98 °F (36.7 °C)     BP Readings from Last 3 Encounters:   11/22/19 120/72   08/28/19  110/60   02/08/19 116/80     Pulse Readings from Last 3 Encounters:   11/22/19 78   08/28/19 82   02/08/19 80     Body mass index is 34.54 kg/m².    Physical Exam   Constitutional: She is oriented to person, place, and time. She appears well-developed and well-nourished. No distress.   HENT:   Head: Normocephalic and atraumatic. Hair is normal.   Right Ear: Hearing, tympanic membrane, external ear and ear canal normal. No drainage. No decreased hearing is noted.   Left Ear: Hearing, tympanic membrane, external ear and ear canal normal. No decreased hearing is noted.   Nose: No nasal deformity.   Mouth/Throat: Oropharynx is clear and moist.   Eyes: Conjunctivae, EOM and lids are normal. Pupils are equal, round, and reactive to light. Right eye exhibits no discharge. Left eye exhibits no discharge.   Neck: Normal range of motion. Neck supple. No JVD present. No tracheal deviation present. No thyromegaly present.   Cardiovascular: Normal rate, regular rhythm, normal heart sounds, intact distal pulses and normal pulses. Exam reveals no gallop and no friction rub.   No murmur heard.  Pulmonary/Chest: Effort normal and breath sounds normal. No respiratory distress. She has no wheezes. She has no rales. She exhibits no tenderness.   Abdominal: Soft. She exhibits no distension and no mass. There is no tenderness. There is no rebound and no guarding. No hernia.   Musculoskeletal: Normal range of motion. She exhibits no edema, tenderness or deformity.   Lymphadenopathy:     She has no cervical adenopathy.   Neurological: She is alert and oriented to person, place, and time. She has normal reflexes. She displays normal reflexes. No cranial nerve deficit. She exhibits normal muscle tone. Coordination normal.   Skin: Skin is warm and dry. No rash noted. She is not diaphoretic. No erythema.   Psychiatric: She has a normal mood and affect. Her behavior is normal. Judgment and thought content normal.   Vitals  reviewed.      Assessment/Plan   Marcia was seen today for annual exam.    Diagnoses and all orders for this visit:    Physical exam, annual        #1 CPE-preventing counseling provided.  Obesity increases risk of developing diabetes and heart disease.  Continue to work on weight loss.  Increase exercise to at least 5 days a week, 30 minutes a day.  Patient is getting flu vaccine today.  She is up-to-date with cancer screening.  Information on diet to decrease cholesterol provided.  Continue regular dental appointments.  Sun protection discussed.  IFG-increased risk for developing diabetes.  Weight loss can decrease it.

## 2019-11-22 NOTE — PATIENT INSTRUCTIONS
"High Cholesterol    High cholesterol is a condition in which the blood has high levels of a white, waxy, fat-like substance (cholesterol). The human body needs small amounts of cholesterol. The liver makes all the cholesterol that the body needs. Extra (excess) cholesterol comes from the food that we eat.  Cholesterol is carried from the liver by the blood through the blood vessels. If you have high cholesterol, deposits (plaques) may build up on the walls of your blood vessels (arteries). Plaques make the arteries narrower and stiffer. Cholesterol plaques increase your risk for heart attack and stroke. Work with your health care provider to keep your cholesterol levels in a healthy range.  What increases the risk?  This condition is more likely to develop in people who:  · Eat foods that are high in animal fat (saturated fat) or cholesterol.  · Are overweight.  · Are not getting enough exercise.  · Have a family history of high cholesterol.  What are the signs or symptoms?  There are no symptoms of this condition.  How is this diagnosed?  This condition may be diagnosed from the results of a blood test.  · If you are older than age 20, your health care provider may check your cholesterol every 4-6 years.  · You may be checked more often if you already have high cholesterol or other risk factors for heart disease.  The blood test for cholesterol measures:  · \"Bad\" cholesterol (LDL cholesterol). This is the main type of cholesterol that causes heart disease. The desired level for LDL is less than 100.  · \"Good\" cholesterol (HDL cholesterol). This type helps to protect against heart disease by cleaning the arteries and carrying the LDL away. The desired level for HDL is 60 or higher.  · Triglycerides. These are fats that the body can store or burn for energy. The desired number for triglycerides is lower than 150.  · Total cholesterol. This is a measure of the total amount of cholesterol in your blood, including LDL " cholesterol, HDL cholesterol, and triglycerides. A healthy number is less than 200.  How is this treated?  This condition is treated with diet changes, lifestyle changes, and medicines.  Diet changes  · This may include eating more whole grains, fruits, vegetables, nuts, and fish.  · This may also include cutting back on red meat and foods that have a lot of added sugar.  Lifestyle changes  · Changes may include getting at least 40 minutes of aerobic exercise 3 times a week. Aerobic exercises include walking, biking, and swimming. Aerobic exercise along with a healthy diet can help you maintain a healthy weight.  · Changes may also include quitting smoking.  Medicines  · Medicines are usually given if diet and lifestyle changes have failed to reduce your cholesterol to healthy levels.  · Your health care provider may prescribe a statin medicine. Statin medicines have been shown to reduce cholesterol, which can reduce the risk of heart disease.  Follow these instructions at home:  Eating and drinking  If told by your health care provider:  · Eat chicken (without skin), fish, veal, shellfish, ground turkey breast, and round or loin cuts of red meat.  · Do not eat fried foods or fatty meats, such as hot dogs and salami.  · Eat plenty of fruits, such as apples.  · Eat plenty of vegetables, such as broccoli, potatoes, and carrots.  · Eat beans, peas, and lentils.  · Eat grains such as barley, rice, couscous, and bulgur wheat.  · Eat pasta without cream sauces.  · Use skim or nonfat milk, and eat low-fat or nonfat yogurt and cheeses.  · Do not eat or drink whole milk, cream, ice cream, egg yolks, or hard cheeses.  · Do not eat stick margarine or tub margarines that contain trans fats (also called partially hydrogenated oils).  · Do not eat saturated tropical oils, such as coconut oil and palm oil.  · Do not eat cakes, cookies, crackers, or other baked goods that contain trans fats.    General instructions  · Exercise as  directed by your health care provider. Increase your activity level with activities such as gardening, walking, and taking the stairs.  · Take over-the-counter and prescription medicines only as told by your health care provider.  · Do not use any products that contain nicotine or tobacco, such as cigarettes and e-cigarettes. If you need help quitting, ask your health care provider.  · Keep all follow-up visits as told by your health care provider. This is important.  Contact a health care provider if:  · You are struggling to maintain a healthy diet or weight.  · You need help to start on an exercise program.  · You need help to stop smoking.  Get help right away if:  · You have chest pain.  · You have trouble breathing.  This information is not intended to replace advice given to you by your health care provider. Make sure you discuss any questions you have with your health care provider.  Document Released: 12/18/2006 Document Revised: 07/15/2017 Document Reviewed: 06/17/2017  [employer] Interactive Patient Education © 2019 [employer]

## 2019-12-19 ENCOUNTER — APPOINTMENT (OUTPATIENT)
Dept: WOMENS IMAGING | Facility: HOSPITAL | Age: 44
End: 2019-12-19

## 2019-12-19 ENCOUNTER — PROCEDURE VISIT (OUTPATIENT)
Dept: OBSTETRICS AND GYNECOLOGY | Facility: CLINIC | Age: 44
End: 2019-12-19

## 2019-12-19 DIAGNOSIS — Z12.31 VISIT FOR SCREENING MAMMOGRAM: Primary | ICD-10-CM

## 2019-12-19 PROCEDURE — 77067 SCR MAMMO BI INCL CAD: CPT | Performed by: RADIOLOGY

## 2019-12-19 PROCEDURE — 77063 BREAST TOMOSYNTHESIS BI: CPT | Performed by: OBSTETRICS & GYNECOLOGY

## 2019-12-19 PROCEDURE — 77067 SCR MAMMO BI INCL CAD: CPT | Performed by: OBSTETRICS & GYNECOLOGY

## 2019-12-19 PROCEDURE — 77063 BREAST TOMOSYNTHESIS BI: CPT | Performed by: RADIOLOGY

## 2019-12-26 ENCOUNTER — TELEPHONE (OUTPATIENT)
Dept: OBSTETRICS AND GYNECOLOGY | Facility: CLINIC | Age: 44
End: 2019-12-26

## 2019-12-26 DIAGNOSIS — R92.8 ABNORMAL FINDING ON BREAST IMAGING: Primary | ICD-10-CM

## 2019-12-26 NOTE — TELEPHONE ENCOUNTER
Called patient to review mammogram.      Pt needs mammogram and ultrasound of left breast for follow up.  Orders placed.      No answer. Left general VM for call back tomorrow during office hours.

## 2019-12-31 NOTE — TELEPHONE ENCOUNTER
I spoke to her spouse today and he is calling to schedule.  They had received the letter in mail. I will follow up with [facility] next week.

## 2020-01-14 ENCOUNTER — APPOINTMENT (OUTPATIENT)
Dept: WOMENS IMAGING | Facility: HOSPITAL | Age: 45
End: 2020-01-14

## 2020-01-14 PROCEDURE — 77065 DX MAMMO INCL CAD UNI: CPT | Performed by: RADIOLOGY

## 2020-01-14 PROCEDURE — G0279 TOMOSYNTHESIS, MAMMO: HCPCS | Performed by: RADIOLOGY

## 2020-01-14 PROCEDURE — 76641 ULTRASOUND BREAST COMPLETE: CPT | Performed by: RADIOLOGY

## 2020-01-14 PROCEDURE — 77061 BREAST TOMOSYNTHESIS UNI: CPT | Performed by: RADIOLOGY

## 2020-01-15 ENCOUNTER — TELEPHONE (OUTPATIENT)
Dept: OBSTETRICS AND GYNECOLOGY | Facility: CLINIC | Age: 45
End: 2020-01-15

## 2020-01-15 DIAGNOSIS — R92.8 ABNORMAL FINDING ON BREAST IMAGING: ICD-10-CM

## 2020-01-15 DIAGNOSIS — N60.02 SOLITARY CYST OF LEFT BREAST: Primary | ICD-10-CM

## 2020-01-15 NOTE — TELEPHONE ENCOUNTER
Please let patient know that breast imaging overall appeared benign, but they recommend a repeat mammogram and ultrasound of left breast in six months. Thanks!

## 2020-01-15 NOTE — TELEPHONE ENCOUNTER
01/15/20  Called pt to both contact numbers on demographics via Polish  ID: [id number]. No answer, there was no voicemail available on mobile, left msg via  on home number.

## 2020-03-02 ENCOUNTER — OFFICE VISIT (OUTPATIENT)
Dept: RETAIL CLINIC | Facility: CLINIC | Age: 45
End: 2020-03-02

## 2020-03-02 VITALS
SYSTOLIC BLOOD PRESSURE: 112 MMHG | DIASTOLIC BLOOD PRESSURE: 78 MMHG | HEART RATE: 82 BPM | OXYGEN SATURATION: 98 % | RESPIRATION RATE: 18 BRPM | TEMPERATURE: 98.3 F

## 2020-03-02 DIAGNOSIS — N39.0 URINARY TRACT INFECTION WITH HEMATURIA, SITE UNSPECIFIED: Primary | ICD-10-CM

## 2020-03-02 DIAGNOSIS — R31.9 URINARY TRACT INFECTION WITH HEMATURIA, SITE UNSPECIFIED: Primary | ICD-10-CM

## 2020-03-02 LAB
BILIRUB BLD-MCNC: NEGATIVE MG/DL
CLARITY, POC: CLEAR
COLOR UR: ABNORMAL
GLUCOSE UR STRIP-MCNC: NEGATIVE MG/DL
KETONES UR QL: NEGATIVE
LEUKOCYTE EST, POC: ABNORMAL
NITRITE UR-MCNC: NEGATIVE MG/ML
PH UR: 7 [PH] (ref 5–8)
PROT UR STRIP-MCNC: ABNORMAL MG/DL
RBC # UR STRIP: ABNORMAL /UL
SP GR UR: 1.01 (ref 1–1.03)
UROBILINOGEN UR QL: NORMAL

## 2020-03-02 PROCEDURE — 99213 OFFICE O/P EST LOW 20 MIN: CPT | Performed by: NURSE PRACTITIONER

## 2020-03-02 PROCEDURE — 81003 URINALYSIS AUTO W/O SCOPE: CPT | Performed by: NURSE PRACTITIONER

## 2020-03-02 RX ORDER — SULFAMETHOXAZOLE AND TRIMETHOPRIM 800; 160 MG/1; MG/1
1 TABLET ORAL 2 TIMES DAILY
Qty: 14 TABLET | Refills: 0 | Status: SHIPPED | OUTPATIENT
Start: 2020-03-02 | End: 2020-03-09

## 2020-03-02 NOTE — PROGRESS NOTES
Subjective   Marcia Shi is a 45 y.o. female.     Urinary Tract Infection    This is a new problem. The current episode started today. The problem occurs every urination. The problem has been gradually worsening. The quality of the pain is described as burning. There has been no fever. She is sexually active. There is no history of pyelonephritis. Associated symptoms include frequency, hematuria and urgency. Pertinent negatives include no chills, flank pain, nausea, possible pregnancy or vomiting. Treatments tried: cranberry extract. The treatment provided no relief. Her past medical history is significant for recurrent UTIs.        The following portions of the patient's history were reviewed and updated as appropriate: allergies, current medications, past family history, past medical history, past social history, past surgical history and problem list.    Review of Systems   Constitutional: Negative for chills, fatigue and fever.   Respiratory: Negative.    Cardiovascular: Negative.    Gastrointestinal: Negative.  Negative for diarrhea, nausea and vomiting.   Genitourinary: Positive for dysuria, frequency, hematuria and urgency. Negative for flank pain.       Objective   Physical Exam   Constitutional: She is oriented to person, place, and time. She appears well-developed.   HENT:   Head: Normocephalic.   Cardiovascular: Normal rate, regular rhythm and normal heart sounds.   Pulmonary/Chest: Effort normal and breath sounds normal.   Abdominal: Soft. There is no tenderness. There is no CVA tenderness.   Neurological: She is alert and oriented to person, place, and time.   Skin: Skin is warm and dry.   Psychiatric: She has a normal mood and affect.     Vitals:    03/02/20 1641   BP: 112/78   Pulse: 82   Resp: 18   Temp: 98.3 °F (36.8 °C)   SpO2: 98%       Brief Urine Lab Results  (Last result in the past 365 days)      Color   Clarity   Blood   Leuk Est   Nitrite   Protein   CREAT   Urine HCG        03/02/20  1640 Red Clear Large Small (1+) Negative 100 mg/dL                 Assessment/Plan   Marcia was seen today for urinary tract infection.    Diagnoses and all orders for this visit:    Urinary tract infection with hematuria, site unspecified  -     POC Urinalysis Dipstick, Automated  -     Urine Culture - Urine, Urine, Clean Catch; Future  -     sulfamethoxazole-trimethoprim (BACTRIM DS,SEPTRA DS) 800-160 MG per tablet; Take 1 tablet by mouth 2 (Two) Times a Day for 7 days.      Urinary Tract Infection, Adult    A urinary tract infection (UTI) is an infection of any part of the urinary tract. The urinary tract includes the kidneys, ureters, bladder, and urethra. These organs make, store, and get rid of urine in the body.  Your health care provider may use other names to describe the infection. An upper UTI affects the ureters and kidneys (pyelonephritis). A lower UTI affects the bladder (cystitis) and urethra (urethritis).  What are the causes?  Most urinary tract infections are caused by bacteria in your genital area, around the entrance to your urinary tract (urethra). These bacteria grow and cause inflammation of your urinary tract.  What increases the risk?  You are more likely to develop this condition if:  · You have a urinary catheter that stays in place (indwelling).  · You are not able to control when you urinate or have a bowel movement (you have incontinence).  · You are female and you:  ? Use a spermicide or diaphragm for birth control.  ? Have low estrogen levels.  ? Are pregnant.  · You have certain genes that increase your risk (genetics).  · You are sexually active.  · You take antibiotic medicines.  · You have a condition that causes your flow of urine to slow down, such as:  ? An enlarged prostate, if you are male.  ? Blockage in your urethra (stricture).  ? A kidney stone.  ? A nerve condition that affects your bladder control (neurogenic bladder).  ? Not getting enough to drink, or not urinating  often.  · You have certain medical conditions, such as:  ? Diabetes.  ? A weak disease-fighting system (immunesystem).  ? Sickle cell disease.  ? Gout.  ? Spinal cord injury.  What are the signs or symptoms?  Symptoms of this condition include:  · Needing to urinate right away (urgently).  · Frequent urination or passing small amounts of urine frequently.  · Pain or burning with urination.  · Blood in the urine.  · Urine that smells bad or unusual.  · Trouble urinating.  · Cloudy urine.  · Vaginal discharge, if you are female.  · Pain in the abdomen or the lower back.  You may also have:  · Vomiting or a decreased appetite.  · Confusion.  · Irritability or tiredness.  · A fever.  · Diarrhea.  The first symptom in older adults may be confusion. In some cases, they may not have any symptoms until the infection has worsened.  How is this diagnosed?  This condition is diagnosed based on your medical history and a physical exam. You may also have other tests, including:  · Urine tests.  · Blood tests.  · Tests for sexually transmitted infections (STIs).  If you have had more than one UTI, a cystoscopy or imaging studies may be done to determine the cause of the infections.  How is this treated?  Treatment for this condition includes:  · Antibiotic medicine.  · Over-the-counter medicines to treat discomfort.  · Drinking enough water to stay hydrated.  If you have frequent infections or have other conditions such as a kidney stone, you may need to see a health care provider who specializes in the urinary tract (urologist).  In rare cases, urinary tract infections can cause sepsis. Sepsis is a life-threatening condition that occurs when the body responds to an infection. Sepsis is treated in the hospital with IV antibiotics, fluids, and other medicines.  Follow these instructions at home:    Medicines  · Take over-the-counter and prescription medicines only as told by your health care provider.  · If you were prescribed an  antibiotic medicine, take it as told by your health care provider. Do not stop using the antibiotic even if you start to feel better.  General instructions  · Make sure you:  ? Empty your bladder often and completely. Do not hold urine for long periods of time.  ? Empty your bladder after sex.  ? Wipe from front to back after a bowel movement if you are female. Use each tissue one time when you wipe.  · Drink enough fluid to keep your urine pale yellow.  · Keep all follow-up visits as told by your health care provider. This is important.  Contact a health care provider if:  · Your symptoms do not get better after 1-2 days.  · Your symptoms go away and then return.  Get help right away if you have:  · Severe pain in your back or your lower abdomen.  · A fever.  · Nausea or vomiting.  Summary  · A urinary tract infection (UTI) is an infection of any part of the urinary tract, which includes the kidneys, ureters, bladder, and urethra.  · Most urinary tract infections are caused by bacteria in your genital area, around the entrance to your urinary tract (urethra).  · Treatment for this condition often includes antibiotic medicines.  · If you were prescribed an antibiotic medicine, take it as told by your health care provider. Do not stop using the antibiotic even if you start to feel better.  · Keep all follow-up visits as told by your health care provider. This is important.  This information is not intended to replace advice given to you by your health care provider. Make sure you discuss any questions you have with your health care provider.  Document Released: 09/27/2006 Document Revised: 12/05/2019 Document Reviewed: 06/27/2019  [employer] Interactive Patient Education © 2020 [employer]

## 2020-03-02 NOTE — PATIENT INSTRUCTIONS
Urinary Tract Infection, Adult    A urinary tract infection (UTI) is an infection of any part of the urinary tract. The urinary tract includes the kidneys, ureters, bladder, and urethra. These organs make, store, and get rid of urine in the body.  Your health care provider may use other names to describe the infection. An upper UTI affects the ureters and kidneys (pyelonephritis). A lower UTI affects the bladder (cystitis) and urethra (urethritis).  What are the causes?  Most urinary tract infections are caused by bacteria in your genital area, around the entrance to your urinary tract (urethra). These bacteria grow and cause inflammation of your urinary tract.  What increases the risk?  You are more likely to develop this condition if:  · You have a urinary catheter that stays in place (indwelling).  · You are not able to control when you urinate or have a bowel movement (you have incontinence).  · You are female and you:  ? Use a spermicide or diaphragm for birth control.  ? Have low estrogen levels.  ? Are pregnant.  · You have certain genes that increase your risk (genetics).  · You are sexually active.  · You take antibiotic medicines.  · You have a condition that causes your flow of urine to slow down, such as:  ? An enlarged prostate, if you are male.  ? Blockage in your urethra (stricture).  ? A kidney stone.  ? A nerve condition that affects your bladder control (neurogenic bladder).  ? Not getting enough to drink, or not urinating often.  · You have certain medical conditions, such as:  ? Diabetes.  ? A weak disease-fighting system (immunesystem).  ? Sickle cell disease.  ? Gout.  ? Spinal cord injury.  What are the signs or symptoms?  Symptoms of this condition include:  · Needing to urinate right away (urgently).  · Frequent urination or passing small amounts of urine frequently.  · Pain or burning with urination.  · Blood in the urine.  · Urine that smells bad or unusual.  · Trouble urinating.  · Cloudy  urine.  · Vaginal discharge, if you are female.  · Pain in the abdomen or the lower back.  You may also have:  · Vomiting or a decreased appetite.  · Confusion.  · Irritability or tiredness.  · A fever.  · Diarrhea.  The first symptom in older adults may be confusion. In some cases, they may not have any symptoms until the infection has worsened.  How is this diagnosed?  This condition is diagnosed based on your medical history and a physical exam. You may also have other tests, including:  · Urine tests.  · Blood tests.  · Tests for sexually transmitted infections (STIs).  If you have had more than one UTI, a cystoscopy or imaging studies may be done to determine the cause of the infections.  How is this treated?  Treatment for this condition includes:  · Antibiotic medicine.  · Over-the-counter medicines to treat discomfort.  · Drinking enough water to stay hydrated.  If you have frequent infections or have other conditions such as a kidney stone, you may need to see a health care provider who specializes in the urinary tract (urologist).  In rare cases, urinary tract infections can cause sepsis. Sepsis is a life-threatening condition that occurs when the body responds to an infection. Sepsis is treated in the hospital with IV antibiotics, fluids, and other medicines.  Follow these instructions at home:    Medicines  · Take over-the-counter and prescription medicines only as told by your health care provider.  · If you were prescribed an antibiotic medicine, take it as told by your health care provider. Do not stop using the antibiotic even if you start to feel better.  General instructions  · Make sure you:  ? Empty your bladder often and completely. Do not hold urine for long periods of time.  ? Empty your bladder after sex.  ? Wipe from front to back after a bowel movement if you are female. Use each tissue one time when you wipe.  · Drink enough fluid to keep your urine pale yellow.  · Keep all follow-up  visits as told by your health care provider. This is important.  Contact a health care provider if:  · Your symptoms do not get better after 1-2 days.  · Your symptoms go away and then return.  Get help right away if you have:  · Severe pain in your back or your lower abdomen.  · A fever.  · Nausea or vomiting.  Summary  · A urinary tract infection (UTI) is an infection of any part of the urinary tract, which includes the kidneys, ureters, bladder, and urethra.  · Most urinary tract infections are caused by bacteria in your genital area, around the entrance to your urinary tract (urethra).  · Treatment for this condition often includes antibiotic medicines.  · If you were prescribed an antibiotic medicine, take it as told by your health care provider. Do not stop using the antibiotic even if you start to feel better.  · Keep all follow-up visits as told by your health care provider. This is important.  This information is not intended to replace advice given to you by your health care provider. Make sure you discuss any questions you have with your health care provider.  Document Released: 09/27/2006 Document Revised: 12/05/2019 Document Reviewed: 06/27/2019  [employer] Interactive Patient Education © 2020 [employer]

## 2020-03-04 ENCOUNTER — TELEPHONE (OUTPATIENT)
Dept: RETAIL CLINIC | Facility: CLINIC | Age: 45
End: 2020-03-04

## 2020-03-04 LAB
BACTERIA UR CULT: NORMAL
BACTERIA UR CULT: NORMAL

## 2020-03-04 NOTE — TELEPHONE ENCOUNTER
Adv of essentially negative urine culture result. Patient reports feeling better. Adv to f/u with pcp with any further issues.patient verbalizes understanding

## 2020-03-18 ENCOUNTER — OFFICE VISIT (OUTPATIENT)
Dept: OBSTETRICS AND GYNECOLOGY | Facility: CLINIC | Age: 45
End: 2020-03-18

## 2020-03-18 VITALS
HEART RATE: 73 BPM | BODY MASS INDEX: 35.84 KG/M2 | WEIGHT: 242 LBS | HEIGHT: 69 IN | SYSTOLIC BLOOD PRESSURE: 127 MMHG | DIASTOLIC BLOOD PRESSURE: 90 MMHG

## 2020-03-18 DIAGNOSIS — Z01.419 ENCOUNTER FOR WELL WOMAN EXAM WITH ROUTINE GYNECOLOGICAL EXAM: Primary | ICD-10-CM

## 2020-03-18 PROCEDURE — 99396 PREV VISIT EST AGE 40-64: CPT | Performed by: OBSTETRICS & GYNECOLOGY

## 2020-03-18 NOTE — PROGRESS NOTES
"SUBJECTIVE:   Chief Complaint   Patient presents with   • Annual Exam     18 NILM -HPV, last mammo: 19        Marcia Shi is a 45 y.o.  who presents for an annual examination     Pap history:  Last pap: 2018 NIL, HPV negative  Prior abnormal paps: no  STDs  Sexually active: yes  History of STDs: no  Contraception:  None  Still the same - regular periods, 6 days of bleeding every 28 days, 2 days of heavy (changing a pad or tampon every 2 hours), after lighter    Had mammogram Dec 2019 - multiple likely benign cysts    History of physical abuse: no, History of sexual abuse: no and History of verbal/emotional abuse: no    Screening for BRCA-   Is patient's family history significant for BRCA risk factors? No  Mother was just diagnosed with \"Cervical Cancer\" at age 79 in [place].  She is undergoing chemo    Past Medical History:   Diagnosis Date   • Ganglion cyst of wrist    • UTI (urinary tract infection)    • Vitamin D deficiency      Past Surgical History:   Procedure Laterality Date   •  SECTION     • TONSILLECTOMY       OB History    Para Term  AB Living   1 1 1     1   SAB TAB Ectopic Molar Multiple Live Births             1      # Outcome Date GA Lbr José Miguel/2nd Weight Sex Delivery Anes PTL Lv   1 Term     F CS-LTranv   KASSIDY      Social History     Tobacco Use   • Smoking status: Former Smoker   • Smokeless tobacco: Never Used   • Tobacco comment: quit 15 yrs ago   Substance Use Topics   • Alcohol use: Yes     Comment: mildly   • Drug use: No     Family History   Problem Relation Age of Onset   • Hypertension Mother    • Cervical cancer Mother 79   • Colon cancer Father 70   • Lymphoma Father    • Breast cancer Neg Hx    • Ovarian cancer Neg Hx    • Uterine cancer Neg Hx    • Deep vein thrombosis Neg Hx    • Pulmonary embolism Neg Hx      Current Outpatient Medications on File Prior to Visit   Medication Sig Dispense Refill   • cetirizine (ZyrTEC) 10 MG tablet " "Take 10 mg by mouth daily.     • cholecalciferol (VITAMIN D3) 1000 UNITS tablet Take by mouth.     • citalopram (CeleXA) 40 MG tablet Take 1 tablet by mouth Daily. 90 tablet 1   • CRANBERRY PO Take by mouth.     • fluticasone (FLONASE) 50 MCG/ACT nasal spray 2 sprays into the nostril(s) as directed by provider Daily. 1 bottle 5   • Melatonin 5 MG tablet dispersible Take by mouth.     • Multiple Vitamin (MULTIVITAMIN) capsule Take by mouth.     • traZODone (DESYREL) 50 MG tablet Take 1 tablet by mouth Every Night. 90 tablet 1   • [DISCONTINUED] montelukast (SINGULAIR) 10 MG tablet Take 1 tablet by mouth Every Night. 90 tablet 1     No current facility-administered medications on file prior to visit.      No Known Allergies     Review of Systems   Constitutional: Negative for chills, fever and unexpected weight change.   HENT: Negative for congestion, nosebleeds and sore throat.    Eyes: Negative for visual disturbance.   Respiratory: Negative for cough, chest tightness and shortness of breath.    Cardiovascular: Negative for chest pain and palpitations.   Gastrointestinal: Negative for abdominal pain, constipation, diarrhea, nausea and vomiting.   Endocrine: Negative for polyuria.   Genitourinary: Negative for difficulty urinating, dyspareunia, dysuria, frequency, genital sores, hematuria, menstrual problem, pelvic pain, urgency, vaginal bleeding, vaginal discharge and vaginal pain.   Musculoskeletal: Negative for arthralgias, joint swelling and myalgias.   Skin: Negative for color change and rash.   Neurological: Negative for dizziness, light-headedness and headaches.   Hematological: Does not bruise/bleed easily.   Psychiatric/Behavioral: Negative for dysphoric mood. The patient is not nervous/anxious.          OBJECTIVE:   Vitals:    03/18/20 1335   BP: 127/90   Pulse: 73   Weight: 110 kg (242 lb)   Height: 175.3 cm (69.02\")      Physical Exam   Constitutional: She is oriented to person, place, and time. She " appears well-developed and well-nourished. No distress.   HENT:   Head: Normocephalic and atraumatic.   Eyes: EOM are normal. No scleral icterus.   Neck: Normal range of motion. Neck supple. No thyromegaly present.   Cardiovascular: Normal rate and regular rhythm. Exam reveals no gallop and no friction rub.   No murmur heard.  Pulmonary/Chest: Effort normal and breath sounds normal. No respiratory distress. She has no wheezes. She has no rales. She exhibits no tenderness. Right breast exhibits no inverted nipple. Left breast exhibits no inverted nipple. No breast swelling, tenderness, discharge or bleeding. Breasts are symmetrical.   Abdominal: Soft. Bowel sounds are normal. She exhibits no distension. There is no tenderness. There is no guarding.   Genitourinary: Vagina normal and uterus normal. There is no rash, tenderness, lesion, injury or Bartholin's cyst on the right labia. There is no rash, tenderness, lesion, injury or Bartholin's cyst on the left labia. Uterus is not mobile.   Cervix is not parous. Cervix does not exhibit motion tenderness, discharge, friability, lesion, polyp, nabothian cyst, eversion, pinkness or cyanosis. Right adnexum displays no mass, no tenderness and no fullness. Right adnexum is present.Left adnexum displays no mass, no tenderness and no fullness. Left adnexum is present.No vaginal discharge found.   Musculoskeletal: She exhibits no edema or deformity.   Neurological: She is alert and oriented to person, place, and time.   Skin: Skin is warm and dry. She is not diaphoretic.   Psychiatric: She has a normal mood and affect. Her speech is normal and behavior is normal. Judgment and thought content normal. Cognition and memory are normal.       ASSESSMENT/PLAN:   1. Encounter for well woman exam with routine gynecological exam        Well woman counseling/screening:    Cervical cancer screening:    Reports no h/o cervical dysplasia   The patient is due for a pap in 2023, however patient  desires today due to change in family history. She was counseled that cervical cancer is unlikely to be genetic and more related to the HPV infection. She was counseled on routine screening guidelines, but still desired a pap smear. Aware of limitations of testing and possibility of out of pocket expense  Breast cancer screening:    Clinical breast exam recommended for age 20-39 years every 1-3 years   Mammogram recommended starting age 40   Breast self awareness encouraged   Recommend mammogram to be scheduled one year from previous  STD Screening   Patient desired no testing.    Contraception :   Declines    Family history    does not demonstrate need for genetics referral   Healthy lifestyle counseling:   return for routine annual checkups   Body mass index is 35.72 kg/m². Encouraged weight loss with dietary changes, exercise   Encouraged continued smoking cessation

## 2020-03-20 RX ORDER — CITALOPRAM 40 MG/1
40 TABLET ORAL DAILY
Qty: 30 TABLET | Refills: 0 | Status: SHIPPED | OUTPATIENT
Start: 2020-03-20 | End: 2020-05-04 | Stop reason: SDUPTHER

## 2020-03-23 LAB
CYTOLOGIST CVX/VAG CYTO: NORMAL
CYTOLOGY CVX/VAG DOC CYTO: NORMAL
CYTOLOGY CVX/VAG DOC THIN PREP: NORMAL
DX ICD CODE: NORMAL
HIV 1 & 2 AB SER-IMP: NORMAL
HPV I/H RISK 4 DNA CVX QL PROBE+SIG AMP: NEGATIVE
OTHER STN SPEC: NORMAL
STAT OF ADQ CVX/VAG CYTO-IMP: NORMAL

## 2020-04-13 ENCOUNTER — E-VISIT (OUTPATIENT)
Dept: FAMILY MEDICINE CLINIC | Facility: TELEHEALTH | Age: 45
End: 2020-04-13

## 2020-04-13 DIAGNOSIS — N39.0 URINARY TRACT INFECTION WITHOUT HEMATURIA, SITE UNSPECIFIED: Primary | ICD-10-CM

## 2020-04-13 PROCEDURE — 99213 OFFICE O/P EST LOW 20 MIN: CPT | Performed by: NURSE PRACTITIONER

## 2020-04-13 RX ORDER — NITROFURANTOIN 25; 75 MG/1; MG/1
100 CAPSULE ORAL 2 TIMES DAILY
Qty: 14 CAPSULE | Refills: 0 | Status: SHIPPED | OUTPATIENT
Start: 2020-04-13 | End: 2020-04-20

## 2020-04-13 RX ORDER — PHENAZOPYRIDINE HYDROCHLORIDE 100 MG/1
100 TABLET, FILM COATED ORAL 3 TIMES DAILY PRN
Qty: 6 TABLET | Refills: 0 | Status: SHIPPED | OUTPATIENT
Start: 2020-04-13 | End: 2020-04-15

## 2020-04-14 NOTE — PROGRESS NOTES
Subjective   Marcia Shi is a 45 y.o. female CC. Painful urination.       History of Present Illness  44 y/o female requesting Evisit for painful urination for a couple of days.     Review of Systems   ROS:  General:  Denies fever chills or weakness  Gastrointestinal:  Denies NVD, or pain  : Positive dysuria,and urgency  Musculoskeletal: denies back pain    Objective   Physical Exam: n/a      Assessment/Plan      Diagnosis Plan   1. Urinary tract infection without hematuria, site unspecified       New Medications Ordered This Visit   Medications   • nitrofurantoin, macrocrystal-monohydrate, (Macrobid) 100 MG capsule     Sig: Take 1 capsule by mouth 2 (Two) Times a Day for 7 days.     Dispense:  14 capsule     Refill:  0   • phenazopyridine (Pyridium) 100 MG tablet     Sig: Take 1 tablet by mouth 3 (Three) Times a Day As Needed for Bladder Spasms (and painful urination) for up to 2 days.     Dispense:  6 tablet     Refill:  0       Follow up with PCP in about 3 days (around 4/16/2020), or sooner if symptoms worsen or fail to improve.

## 2020-04-14 NOTE — PATIENT INSTRUCTIONS
Diagnosis Plan   1. Urinary tract infection without hematuria, site unspecified     Take all medication until gone. If symptoms persist call your PCP for follow up. We recommend that you have your blood pressure checked regularly.        Phenazopyridine tablets  What is this medicine?  PHENAZOPYRIDINE (fen az oh YAMEL redman) is a pain reliever. It is used to stop the pain, burning, or discomfort caused by infection or irritation of the urinary tract. This medicine is not an antibiotic. It will not cure a urinary tract infection.  This medicine may be used for other purposes; ask your health care provider or pharmacist if you have questions.  COMMON BRAND NAME(S): AZO, Azo-100, Azo-Gesic, Azo-Septic, Azo-Standard, Phenazo, Prodium, Pyridium, Urinary Analgesic, Uristat, Uristat Ultra  What should I tell my health care provider before I take this medicine?  They need to know if you have any of these conditions:  -glucose-6-phosphate dehydrogenase (G6PD) deficiency  -kidney disease  -an unusual or allergic reaction to phenazopyridine, other medicines, foods, dyes, or preservatives  -pregnant or trying to get pregnant  -breast-feeding  How should I use this medicine?  Take this medicine by mouth with a glass of water. Follow the directions on the prescription label. Take after meals. Take your doses at regular intervals. Do not take your medicine more often than directed. Do not skip doses or stop your medicine early even if you feel better. Do not stop taking except on your doctor's advice.  Talk to your pediatrician regarding the use of this medicine in children. Special care may be needed.  Overdosage: If you think you have taken too much of this medicine contact a poison control center or emergency room at once.  NOTE: This medicine is only for you. Do not share this medicine with others.  What if I miss a dose?  If you miss a dose, take it as soon as you can. If it is almost time for your next dose, take only that  dose. Do not take double or extra doses.  What may interact with this medicine?  Interactions are not expected.  This list may not describe all possible interactions. Give your health care provider a list of all the medicines, herbs, non-prescription drugs, or dietary supplements you use. Also tell them if you smoke, drink alcohol, or use illegal drugs. Some items may interact with your medicine.  What should I watch for while using this medicine?  Tell your doctor or health care professional if your symptoms do not improve or if they get worse.  This medicine colors body fluids red. This effect is harmless and will go away after you are done taking the medicine. It will change urine to an dark orange or red color. The red color may stain clothing. Soft contact lenses may become permanently stained. It is best not to wear soft contact lenses while taking this medicine.  If you are diabetic you may get a false positive result for sugar in your urine. Talk to your health care provider.  What side effects may I notice from receiving this medicine?  Side effects that you should report to your doctor or health care professional as soon as possible:  -allergic reactions like skin rash, itching or hives, swelling of the face, lips, or tongue  -blue or purple color of the skin  -difficulty breathing  -fever  -less urine  -unusual bleeding, bruising  -unusual tired, weak  -vomiting  -yellowing of the eyes or skin  Side effects that usually do not require medical attention (report to your doctor or health care professional if they continue or are bothersome):  -dark urine  -headache  -stomach upset  This list may not describe all possible side effects. Call your doctor for medical advice about side effects. You may report side effects to FDA at 3-610-FDA-2127.  Where should I keep my medicine?  Keep out of the reach of children.  Store at room temperature between 15 and 30 degrees C (59 and 86 degrees F). Protect from light  and moisture. Throw away any unused medicine after the expiration date.  NOTE: This sheet is a summary. It may not cover all possible information. If you have questions about this medicine, talk to your doctor, pharmacist, or health care provider.  © 2020 [employer] (2009-07-16 11:04:07)    Urinary Tract Infection, Adult  A urinary tract infection (UTI) is an infection of any part of the urinary tract. The urinary tract includes:  · The kidneys.  · The ureters.  · The bladder.  · The urethra.  These organs make, store, and get rid of pee (urine) in the body.  What are the causes?  This is caused by germs (bacteria) in your genital area. These germs grow and cause swelling (inflammation) of your urinary tract.  What increases the risk?  You are more likely to develop this condition if:  · You have a small, thin tube (catheter) to drain pee.  · You cannot control when you pee or poop (incontinence).  · You are female, and:  ? You use these methods to prevent pregnancy:  ? A medicine that kills sperm (spermicide).  ? A device that blocks sperm (diaphragm).  ? You have low levels of a female hormone (estrogen).  ? You are pregnant.  · You have genes that add to your risk.  · You are sexually active.  · You take antibiotic medicines.  · You have trouble peeing because of:  ? A prostate that is bigger than normal, if you are male.  ? A blockage in the part of your body that drains pee from the bladder (urethra).  ? A kidney stone.  ? A nerve condition that affects your bladder (neurogenic bladder).  ? Not getting enough to drink.  ? Not peeing often enough.  · You have other conditions, such as:  ? Diabetes.  ? A weak disease-fighting system (immune system).  ? Sickle cell disease.  ? Gout.  ? Injury of the spine.  What are the signs or symptoms?  Symptoms of this condition include:  · Needing to pee right away (urgently).  · Peeing often.  · Peeing small amounts often.  · Pain or burning when peeing.  · Blood  in the pee.  · Pee that smells bad or not like normal.  · Trouble peeing.  · Pee that is cloudy.  · Fluid coming from the vagina, if you are female.  · Pain in the belly or lower back.  Other symptoms include:  · Throwing up (vomiting).  · No urge to eat.  · Feeling mixed up (confused).  · Being tired and grouchy (irritable).  · A fever.  · Watery poop (diarrhea).  How is this treated?  This condition may be treated with:  · Antibiotic medicine.  · Other medicines.  · Drinking enough water.  Follow these instructions at home:    Medicines  · Take over-the-counter and prescription medicines only as told by your doctor.  · If you were prescribed an antibiotic medicine, take it as told by your doctor. Do not stop taking it even if you start to feel better.  General instructions  · Make sure you:  ? Pee until your bladder is empty.  ? Do not hold pee for a long time.  ? Empty your bladder after sex.  ? Wipe from front to back after pooping if you are a female. Use each tissue one time when you wipe.  · Drink enough fluid to keep your pee pale yellow.  · Keep all follow-up visits as told by your doctor. This is important.  Contact a doctor if:  · You do not get better after 1-2 days.  · Your symptoms go away and then come back.  Get help right away if:  · You have very bad back pain.  · You have very bad pain in your lower belly.  · You have a fever.  · You are sick to your stomach (nauseous).  · You are throwing up.  Summary  · A urinary tract infection (UTI) is an infection of any part of the urinary tract.  · This condition is caused by germs in your genital area.  · There are many risk factors for a UTI. These include having a small, thin tube to drain pee and not being able to control when you pee or poop.  · Treatment includes antibiotic medicines for germs.  · Drink enough fluid to keep your pee pale yellow.  This information is not intended to replace advice given to you by your health care provider. Make sure  you discuss any questions you have with your health care provider.  Document Released: 06/05/2009 Document Revised: 12/05/2019 Document Reviewed: 06/27/2019  [employer] Interactive Patient Education © 2020 [employer]

## 2020-05-04 RX ORDER — TRAZODONE HYDROCHLORIDE 50 MG/1
50 TABLET ORAL NIGHTLY
Qty: 30 TABLET | Refills: 0 | Status: SHIPPED | OUTPATIENT
Start: 2020-05-04 | End: 2020-08-17

## 2020-05-04 RX ORDER — TRAZODONE HYDROCHLORIDE 50 MG/1
TABLET ORAL
Qty: 90 TABLET | Refills: 0 | Status: SHIPPED | OUTPATIENT
Start: 2020-05-04 | End: 2020-05-04 | Stop reason: SDUPTHER

## 2020-05-04 RX ORDER — CITALOPRAM 40 MG/1
40 TABLET ORAL DAILY
Qty: 30 TABLET | Refills: 0 | Status: SHIPPED | OUTPATIENT
Start: 2020-05-04 | End: 2020-06-29

## 2020-06-01 RX ORDER — MONTELUKAST SODIUM 10 MG/1
TABLET ORAL
Qty: 90 TABLET | Refills: 0 | Status: SHIPPED | OUTPATIENT
Start: 2020-06-01 | End: 2020-08-17

## 2020-06-29 RX ORDER — CITALOPRAM 40 MG/1
TABLET ORAL
Qty: 30 TABLET | Refills: 0 | Status: SHIPPED | OUTPATIENT
Start: 2020-06-29 | End: 2020-08-17

## 2020-08-11 ENCOUNTER — APPOINTMENT (OUTPATIENT)
Dept: WOMENS IMAGING | Facility: HOSPITAL | Age: 45
End: 2020-08-11

## 2020-08-11 PROCEDURE — G0279 TOMOSYNTHESIS, MAMMO: HCPCS | Performed by: RADIOLOGY

## 2020-08-11 PROCEDURE — 76641 ULTRASOUND BREAST COMPLETE: CPT | Performed by: RADIOLOGY

## 2020-08-11 PROCEDURE — 77065 DX MAMMO INCL CAD UNI: CPT | Performed by: RADIOLOGY

## 2020-08-11 PROCEDURE — 77061 BREAST TOMOSYNTHESIS UNI: CPT | Performed by: RADIOLOGY

## 2020-08-12 DIAGNOSIS — N60.02 SOLITARY CYST OF LEFT BREAST: ICD-10-CM

## 2020-08-17 RX ORDER — MONTELUKAST SODIUM 10 MG/1
TABLET ORAL
Qty: 30 TABLET | Refills: 0 | Status: SHIPPED | OUTPATIENT
Start: 2020-08-17 | End: 2020-10-05

## 2020-08-17 RX ORDER — CITALOPRAM 40 MG/1
TABLET ORAL
Qty: 30 TABLET | Refills: 0 | Status: SHIPPED | OUTPATIENT
Start: 2020-08-17 | End: 2020-10-05

## 2020-08-17 RX ORDER — TRAZODONE HYDROCHLORIDE 50 MG/1
TABLET ORAL
Qty: 30 TABLET | Refills: 0 | Status: SHIPPED | OUTPATIENT
Start: 2020-08-17 | End: 2020-11-25 | Stop reason: SDUPTHER

## 2020-10-05 RX ORDER — MONTELUKAST SODIUM 10 MG/1
TABLET ORAL
Qty: 90 TABLET | Refills: 0 | Status: SHIPPED | OUTPATIENT
Start: 2020-10-05 | End: 2020-11-25 | Stop reason: SDUPTHER

## 2020-10-05 RX ORDER — CITALOPRAM 40 MG/1
TABLET ORAL
Qty: 30 TABLET | Refills: 0 | Status: SHIPPED | OUTPATIENT
Start: 2020-10-05 | End: 2020-11-25 | Stop reason: SDUPTHER

## 2020-11-18 DIAGNOSIS — Z00.00 PHYSICAL EXAM, ANNUAL: Primary | ICD-10-CM

## 2020-11-18 DIAGNOSIS — E55.9 VITAMIN D DEFICIENCY: ICD-10-CM

## 2020-11-19 LAB
25(OH)D3+25(OH)D2 SERPL-MCNC: 37.9 NG/ML (ref 30–100)
ALBUMIN SERPL-MCNC: 4.6 G/DL (ref 3.5–5.2)
ALBUMIN/GLOB SERPL: 2.2 G/DL
ALP SERPL-CCNC: 66 U/L (ref 39–117)
ALT SERPL-CCNC: 25 U/L (ref 1–33)
AST SERPL-CCNC: 24 U/L (ref 1–32)
BASOPHILS # BLD AUTO: 0.06 10*3/MM3 (ref 0–0.2)
BASOPHILS NFR BLD AUTO: 0.9 % (ref 0–1.5)
BILIRUB SERPL-MCNC: 0.4 MG/DL (ref 0–1.2)
BUN SERPL-MCNC: 12 MG/DL (ref 6–20)
BUN/CREAT SERPL: 14.3 (ref 7–25)
CALCIUM SERPL-MCNC: 9.4 MG/DL (ref 8.6–10.5)
CHLORIDE SERPL-SCNC: 97 MMOL/L (ref 98–107)
CHOLEST SERPL-MCNC: 227 MG/DL (ref 0–200)
CO2 SERPL-SCNC: 26 MMOL/L (ref 22–29)
CREAT SERPL-MCNC: 0.84 MG/DL (ref 0.57–1)
EOSINOPHIL # BLD AUTO: 0.11 10*3/MM3 (ref 0–0.4)
EOSINOPHIL NFR BLD AUTO: 1.6 % (ref 0.3–6.2)
ERYTHROCYTE [DISTWIDTH] IN BLOOD BY AUTOMATED COUNT: 13 % (ref 12.3–15.4)
GLOBULIN SER CALC-MCNC: 2.1 GM/DL
GLUCOSE SERPL-MCNC: 102 MG/DL (ref 65–99)
HCT VFR BLD AUTO: 41.3 % (ref 34–46.6)
HCV AB S/CO SERPL IA: 0.1 S/CO RATIO (ref 0–0.9)
HDLC SERPL-MCNC: 55 MG/DL (ref 40–60)
HGB BLD-MCNC: 13.5 G/DL (ref 12–15.9)
IMM GRANULOCYTES # BLD AUTO: 0.04 10*3/MM3 (ref 0–0.05)
IMM GRANULOCYTES NFR BLD AUTO: 0.6 % (ref 0–0.5)
LDLC SERPL CALC-MCNC: 148 MG/DL (ref 0–100)
LDLC/HDLC SERPL: 2.64 {RATIO}
LYMPHOCYTES # BLD AUTO: 1.59 10*3/MM3 (ref 0.7–3.1)
LYMPHOCYTES NFR BLD AUTO: 23.7 % (ref 19.6–45.3)
MCH RBC QN AUTO: 29.2 PG (ref 26.6–33)
MCHC RBC AUTO-ENTMCNC: 32.7 G/DL (ref 31.5–35.7)
MCV RBC AUTO: 89.4 FL (ref 79–97)
MONOCYTES # BLD AUTO: 0.42 10*3/MM3 (ref 0.1–0.9)
MONOCYTES NFR BLD AUTO: 6.3 % (ref 5–12)
NEUTROPHILS # BLD AUTO: 4.49 10*3/MM3 (ref 1.7–7)
NEUTROPHILS NFR BLD AUTO: 66.9 % (ref 42.7–76)
NRBC BLD AUTO-RTO: 0.1 /100 WBC (ref 0–0.2)
PLATELET # BLD AUTO: 324 10*3/MM3 (ref 140–450)
POTASSIUM SERPL-SCNC: 4.7 MMOL/L (ref 3.5–5.2)
PROT SERPL-MCNC: 6.7 G/DL (ref 6–8.5)
RBC # BLD AUTO: 4.62 10*6/MM3 (ref 3.77–5.28)
SODIUM SERPL-SCNC: 133 MMOL/L (ref 136–145)
TRIGL SERPL-MCNC: 133 MG/DL (ref 0–150)
TSH SERPL DL<=0.005 MIU/L-ACNC: 2.18 UIU/ML (ref 0.45–4.5)
VLDLC SERPL CALC-MCNC: 24 MG/DL (ref 5–40)
WBC # BLD AUTO: 6.71 10*3/MM3 (ref 3.4–10.8)

## 2020-11-25 ENCOUNTER — OFFICE VISIT (OUTPATIENT)
Dept: INTERNAL MEDICINE | Facility: CLINIC | Age: 45
End: 2020-11-25

## 2020-11-25 VITALS
DIASTOLIC BLOOD PRESSURE: 70 MMHG | BODY MASS INDEX: 35.25 KG/M2 | HEIGHT: 69 IN | TEMPERATURE: 98 F | HEART RATE: 105 BPM | OXYGEN SATURATION: 98 % | WEIGHT: 238 LBS | SYSTOLIC BLOOD PRESSURE: 106 MMHG

## 2020-11-25 DIAGNOSIS — E55.9 VITAMIN D DEFICIENCY: ICD-10-CM

## 2020-11-25 DIAGNOSIS — J30.89 NON-SEASONAL ALLERGIC RHINITIS, UNSPECIFIED TRIGGER: ICD-10-CM

## 2020-11-25 DIAGNOSIS — Z12.11 SCREENING FOR COLON CANCER: ICD-10-CM

## 2020-11-25 DIAGNOSIS — R73.01 IFG (IMPAIRED FASTING GLUCOSE): ICD-10-CM

## 2020-11-25 DIAGNOSIS — G47.09 OTHER INSOMNIA: ICD-10-CM

## 2020-11-25 DIAGNOSIS — Z00.00 PHYSICAL EXAM, ANNUAL: Primary | ICD-10-CM

## 2020-11-25 DIAGNOSIS — F41.8 MIXED ANXIETY DEPRESSIVE DISORDER: ICD-10-CM

## 2020-11-25 PROCEDURE — 90686 IIV4 VACC NO PRSV 0.5 ML IM: CPT | Performed by: FAMILY MEDICINE

## 2020-11-25 PROCEDURE — 99396 PREV VISIT EST AGE 40-64: CPT | Performed by: FAMILY MEDICINE

## 2020-11-25 PROCEDURE — 90472 IMMUNIZATION ADMIN EACH ADD: CPT | Performed by: FAMILY MEDICINE

## 2020-11-25 PROCEDURE — 99214 OFFICE O/P EST MOD 30 MIN: CPT | Performed by: FAMILY MEDICINE

## 2020-11-25 PROCEDURE — 90715 TDAP VACCINE 7 YRS/> IM: CPT | Performed by: FAMILY MEDICINE

## 2020-11-25 PROCEDURE — 90471 IMMUNIZATION ADMIN: CPT | Performed by: FAMILY MEDICINE

## 2020-11-25 RX ORDER — MONTELUKAST SODIUM 10 MG/1
10 TABLET ORAL NIGHTLY
Qty: 90 TABLET | Refills: 1 | Status: SHIPPED | OUTPATIENT
Start: 2020-11-25 | End: 2021-03-23

## 2020-11-25 RX ORDER — FLUTICASONE PROPIONATE 50 MCG
2 SPRAY, SUSPENSION (ML) NASAL DAILY
Qty: 1 BOTTLE | Refills: 5 | Status: SHIPPED | OUTPATIENT
Start: 2020-11-25 | End: 2021-06-01 | Stop reason: SDUPTHER

## 2020-11-25 RX ORDER — TRAZODONE HYDROCHLORIDE 50 MG/1
50 TABLET ORAL NIGHTLY
Qty: 90 TABLET | Refills: 1 | Status: SHIPPED | OUTPATIENT
Start: 2020-11-25 | End: 2021-06-01 | Stop reason: SDUPTHER

## 2020-11-25 RX ORDER — CITALOPRAM 40 MG/1
40 TABLET ORAL DAILY
Qty: 90 TABLET | Refills: 1 | Status: SHIPPED | OUTPATIENT
Start: 2020-11-25 | End: 2021-05-25

## 2020-11-25 NOTE — PATIENT INSTRUCTIONS
Exercising to Lose Weight  Exercise is structured, repetitive physical activity to improve fitness and health. Getting regular exercise is important for everyone. It is especially important if you are overweight. Being overweight increases your risk of heart disease, stroke, diabetes, high blood pressure, and several types of cancer. Reducing your calorie intake and exercising can help you lose weight.  Exercise is usually categorized as moderate or vigorous intensity. To lose weight, most people need to do a certain amount of moderate-intensity or vigorous-intensity exercise each week.  Moderate-intensity exercise    Moderate-intensity exercise is any activity that gets you moving enough to burn at least three times more energy (calories) than if you were sitting.  Examples of moderate exercise include:  · Walking a mile in 15 minutes.  · Doing light yard work.  · Biking at an easy pace.  Most people should get at least 150 minutes (2 hours and 30 minutes) a week of moderate-intensity exercise to maintain their body weight.  Vigorous-intensity exercise  Vigorous-intensity exercise is any activity that gets you moving enough to burn at least six times more calories than if you were sitting. When you exercise at this intensity, you should be working hard enough that you are not able to carry on a conversation.  Examples of vigorous exercise include:  · Running.  · Playing a team sport, such as football, basketball, and soccer.  · Jumping rope.  Most people should get at least 75 minutes (1 hour and 15 minutes) a week of vigorous-intensity exercise to maintain their body weight.  How can exercise affect me?  When you exercise enough to burn more calories than you eat, you lose weight. Exercise also reduces body fat and builds muscle. The more muscle you have, the more calories you burn. Exercise also:  · Improves mood.  · Reduces stress and tension.  · Improves your overall fitness, flexibility, and  endurance.  · Increases bone strength.  The amount of exercise you need to lose weight depends on:  · Your age.  · The type of exercise.  · Any health conditions you have.  · Your overall physical ability.  Talk to your health care provider about how much exercise you need and what types of activities are safe for you.  What actions can I take to lose weight?  Nutrition    · Make changes to your diet as told by your health care provider or diet and nutrition specialist (dietitian). This may include:  ? Eating fewer calories.  ? Eating more protein.  ? Eating less unhealthy fats.  ? Eating a diet that includes fresh fruits and vegetables, whole grains, low-fat dairy products, and lean protein.  ? Avoiding foods with added fat, salt, and sugar.  · Drink plenty of water while you exercise to prevent dehydration or heat stroke.  Activity  · Choose an activity that you enjoy and set realistic goals. Your health care provider can help you make an exercise plan that works for you.  · Exercise at a moderate or vigorous intensity most days of the week.  ? The intensity of exercise may vary from person to person. You can tell how intense a workout is for you by paying attention to your breathing and heartbeat. Most people will notice their breathing and heartbeat get faster with more intense exercise.  · Do resistance training twice each week, such as:  ? Push-ups.  ? Sit-ups.  ? Lifting weights.  ? Using resistance bands.  · Getting short amounts of exercise can be just as helpful as long structured periods of exercise. If you have trouble finding time to exercise, try to include exercise in your daily routine.  ? Get up, stretch, and walk around every 30 minutes throughout the day.  ? Go for a walk during your lunch break.  ? Park your car farther away from your destination.  ? If you take public transportation, get off one stop early and walk the rest of the way.  ? Make phone calls while standing up and walking  around.  ? Take the stairs instead of elevators or escalators.  · Wear comfortable clothes and shoes with good support.  · Do not exercise so much that you hurt yourself, feel dizzy, or get very short of breath.  Where to find more information  · [place] Department of Health and Human Services: www.hhs.gov  · Centers for Disease Control and Prevention (CDC): www.cdc.gov  Contact a health care provider:  · Before starting a new exercise program.  · If you have questions or concerns about your weight.  · If you have a medical problem that keeps you from exercising.  Get help right away if you have any of the following while exercising:  · Injury.  · Dizziness.  · Difficulty breathing or shortness of breath that does not go away when you stop exercising.  · Chest pain.  · Rapid heartbeat.  Summary  · Being overweight increases your risk of heart disease, stroke, diabetes, high blood pressure, and several types of cancer.  · Losing weight happens when you burn more calories than you eat.  · Reducing the amount of calories you eat in addition to getting regular moderate or vigorous exercise each week helps you lose weight.  This information is not intended to replace advice given to you by your health care provider. Make sure you discuss any questions you have with your health care provider.  Document Revised: 12/31/2018 Document Reviewed: 12/31/2018  [employer] Patient Education © 2020 [employer]  Calorie Counting for Weight Loss  Calories are units of energy. Your body needs a certain amount of calories from food to keep you going throughout the day. When you eat more calories than your body needs, your body stores the extra calories as fat. When you eat fewer calories than your body needs, your body burns fat to get the energy it needs.  Calorie counting means keeping track of how many calories you eat and drink each day. Calorie counting can be helpful if you need to lose weight. If you make sure to eat fewer calories  than your body needs, you should lose weight. Ask your health care provider what a healthy weight is for you.  For calorie counting to work, you will need to eat the right number of calories in a day in order to lose a healthy amount of weight per week. A dietitian can help you determine how many calories you need in a day and will give you suggestions on how to reach your calorie goal.  · A healthy amount of weight to lose per week is usually 1-2 lb (0.5-0.9 kg). This usually means that your daily calorie intake should be reduced by 500-750 calories.  · Eating 1,200 - 1,500 calories per day can help most women lose weight.  · Eating 1,500 - 1,800 calories per day can help most men lose weight.  What is my plan?  My goal is to have __________ calories per day.  If I have this many calories per day, I should lose around __________ pounds per week.  What do I need to know about calorie counting?  In order to meet your daily calorie goal, you will need to:  · Find out how many calories are in each food you would like to eat. Try to do this before you eat.  · Decide how much of the food you plan to eat.  · Write down what you ate and how many calories it had. Doing this is called keeping a food log.  To successfully lose weight, it is important to balance calorie counting with a healthy lifestyle that includes regular activity. Aim for 150 minutes of moderate exercise (such as walking) or 75 minutes of vigorous exercise (such as running) each week.  Where do I find calorie information?    The number of calories in a food can be found on a Nutrition Facts label. If a food does not have a Nutrition Facts label, try to look up the calories online or ask your dietitian for help.  Remember that calories are listed per serving. If you choose to have more than one serving of a food, you will have to multiply the calories per serving by the amount of servings you plan to eat. For example, the label on a package of bread might  "say that a serving size is 1 slice and that there are 90 calories in a serving. If you eat 1 slice, you will have eaten 90 calories. If you eat 2 slices, you will have eaten 180 calories.  How do I keep a food log?  Immediately after each meal, record the following information in your food log:  · What you ate. Don't forget to include toppings, sauces, and other extras on the food.  · How much you ate. This can be measured in cups, ounces, or number of items.  · How many calories each food and drink had.  · The total number of calories in the meal.  Keep your food log near you, such as in a small notebook in your pocket, or use a mobile bing or website. Some programs will calculate calories for you and show you how many calories you have left for the day to meet your goal.  What are some calorie counting tips?    · Use your calories on foods and drinks that will fill you up and not leave you hungry:  ? Some examples of foods that fill you up are nuts and nut butters, vegetables, lean proteins, and high-fiber foods like whole grains. High-fiber foods are foods with more than 5 g fiber per serving.  ? Drinks such as sodas, specialty coffee drinks, alcohol, and juices have a lot of calories, yet do not fill you up.  · Eat nutritious foods and avoid empty calories. Empty calories are calories you get from foods or beverages that do not have many vitamins or protein, such as candy, sweets, and soda. It is better to have a nutritious high-calorie food (such as an avocado) than a food with few nutrients (such as a bag of chips).  · Know how many calories are in the foods you eat most often. This will help you calculate calorie counts faster.  · Pay attention to calories in drinks. Low-calorie drinks include water and unsweetened drinks.  · Pay attention to nutrition labels for \"low fat\" or \"fat free\" foods. These foods sometimes have the same amount of calories or more calories than the full fat versions. They also often " have added sugar, starch, or salt, to make up for flavor that was removed with the fat.  · Find a way of tracking calories that works for you. Get creative. Try different apps or programs if writing down calories does not work for you.  What are some portion control tips?  · Know how many calories are in a serving. This will help you know how many servings of a certain food you can have.  · Use a measuring cup to measure serving sizes. You could also try weighing out portions on a kitchen scale. With time, you will be able to estimate serving sizes for some foods.  · Take some time to put servings of different foods on your favorite plates, bowls, and cups so you know what a serving looks like.  · Try not to eat straight from a bag or box. Doing this can lead to overeating. Put the amount you would like to eat in a cup or on a plate to make sure you are eating the right portion.  · Use smaller plates, glasses, and bowls to prevent overeating.  · Try not to multitask (for example, watch TV or use your computer) while eating. If it is time to eat, sit down at a table and enjoy your food. This will help you to know when you are full. It will also help you to be aware of what you are eating and how much you are eating.  What are tips for following this plan?  Reading food labels  · Check the calorie count compared to the serving size. The serving size may be smaller than what you are used to eating.  · Check the source of the calories. Make sure the food you are eating is high in vitamins and protein and low in saturated and trans fats.  Shopping  · Read nutrition labels while you shop. This will help you make healthy decisions before you decide to purchase your food.  · Make a grocery list and stick to it.  Cooking  · Try to cook your favorite foods in a healthier way. For example, try baking instead of frying.  · Use low-fat dairy products.  Meal planning  · Use more fruits and vegetables. Half of your plate should be  "fruits and vegetables.  · Include lean proteins like poultry and fish.  How do I count calories when eating out?  · Ask for smaller portion sizes.  · Consider sharing an entree and sides instead of getting your own entree.  · If you get your own entree, eat only half. Ask for a box at the beginning of your meal and put the rest of your entree in it so you are not tempted to eat it.  · If calories are listed on the menu, choose the lower calorie options.  · Choose dishes that include vegetables, fruits, whole grains, low-fat dairy products, and lean protein.  · Choose items that are boiled, broiled, grilled, or steamed. Stay away from items that are buttered, battered, fried, or served with cream sauce. Items labeled \"crispy\" are usually fried, unless stated otherwise.  · Choose water, low-fat milk, unsweetened iced tea, or other drinks without added sugar. If you want an alcoholic beverage, choose a lower calorie option such as a glass of wine or light beer.  · Ask for dressings, sauces, and syrups on the side. These are usually high in calories, so you should limit the amount you eat.  · If you want a salad, choose a garden salad and ask for grilled meats. Avoid extra toppings like gaytan, cheese, or fried items. Ask for the dressing on the side, or ask for olive oil and vinegar or lemon to use as dressing.  · Estimate how many servings of a food you are given. For example, a serving of cooked rice is ½ cup or about the size of half a baseball. Knowing serving sizes will help you be aware of how much food you are eating at restaurants. The list below tells you how big or small some common portion sizes are based on everyday objects:  ? 1 oz--4 stacked dice.  ? 3 oz--1 deck of cards.  ? 1 tsp--1 die.  ? 1 Tbsp--½ a ping-pong ball.  ? 2 Tbsp--1 ping-pong ball.  ? ½ cup--½ baseball.  ? 1 cup--1 baseball.  Summary  · Calorie counting means keeping track of how many calories you eat and drink each day. If you eat fewer " calories than your body needs, you should lose weight.  · A healthy amount of weight to lose per week is usually 1-2 lb (0.5-0.9 kg). This usually means reducing your daily calorie intake by 500-750 calories.  · The number of calories in a food can be found on a Nutrition Facts label. If a food does not have a Nutrition Facts label, try to look up the calories online or ask your dietitian for help.  · Use your calories on foods and drinks that will fill you up, and not on foods and drinks that will leave you hungry.  · Use smaller plates, glasses, and bowls to prevent overeating.  This information is not intended to replace advice given to you by your health care provider. Make sure you discuss any questions you have with your health care provider.  Document Revised: 09/06/2019 Document Reviewed: 11/17/2017  [employer] Patient Education © 2020 [employer]  Prediabetes Eating Plan  Prediabetes is a condition that causes blood sugar (glucose) levels to be higher than normal. This increases the risk for developing diabetes. In order to prevent diabetes from developing, your health care provider may recommend a diet and other lifestyle changes to help you:  · Control your blood glucose levels.  · Improve your cholesterol levels.  · Manage your blood pressure.  Your health care provider may recommend working with a diet and nutrition specialist (dietitian) to make a meal plan that is best for you.  What are tips for following this plan?  Lifestyle  · Set weight loss goals with the help of your health care team. It is recommended that most people with prediabetes lose 7% of their current body weight.  · Exercise for at least 30 minutes at least 5 days a week.  · Attend a support group or seek ongoing support from a mental health counselor.  · Take over-the-counter and prescription medicines only as told by your health care provider.  Reading food labels  · Read food labels to check the amount of fat, salt (sodium), and  sugar in prepackaged foods. Avoid foods that have:  ? Saturated fats.  ? Trans fats.  ? Added sugars.  · Avoid foods that have more than 300 milligrams (mg) of sodium per serving. Limit your daily sodium intake to less than 2,300 mg each day.  Shopping  · Avoid buying pre-made and processed foods.  Cooking  · Cook with olive oil. Do not use butter, lard, or ghee.  · Bake, broil, grill, or boil foods. Avoid frying.  Meal planning    · Work with your dietitian to develop an eating plan that is right for you. This may include:  ? Tracking how many calories you take in. Use a food diary, notebook, or mobile application to track what you eat at each meal.  ? Using the glycemic index (GI) to plan your meals. The index tells you how quickly a food will raise your blood glucose. Choose low-GI foods. These foods take a longer time to raise blood glucose.  · Consider following a Mediterranean diet. This diet includes:  ? Several servings each day of fresh fruits and vegetables.  ? Eating fish at least twice a week.  ? Several servings each day of whole grains, beans, nuts, and seeds.  ? Using olive oil instead of other fats.  ? Moderate alcohol consumption.  ? Eating small amounts of red meat and whole-fat dairy.  · If you have high blood pressure, you may need to limit your sodium intake or follow a diet such as the DASH eating plan. DASH is an eating plan that aims to lower high blood pressure.  What foods are recommended?  The items listed below may not be a complete list. Talk with your dietitian about what dietary choices are best for you.  Grains  Whole grains, such as whole-wheat or whole-grain breads, crackers, cereals, and pasta. Unsweetened oatmeal. Bulgur. Barley. Quinoa. Brown rice. Corn or whole-wheat flour tortillas or taco shells.  Vegetables  Lettuce. Spinach. Peas. Beets. Cauliflower. Cabbage. Broccoli. Carrots. Tomatoes. Squash. Eggplant. Herbs. Peppers. Onions. Cucumbers. [place] sprouts.  Fruits  Berries.  Bananas. Apples. Oranges. Grapes. Papaya. Joey. Pomegranate. Kiwi. Grapefruit. Cherries.  Meats and other protein foods  Seafood. Poultry without skin. Lean cuts of pork and beef. Tofu. Eggs. Nuts. Beans.  Dairy  Low-fat or fat-free dairy products, such as yogurt, cottage cheese, and cheese.  Beverages  Water. Tea. Coffee. Sugar-free or diet soda. [place] water. Lowfat or no-fat milk. Milk alternatives, such as soy or almond milk.  Fats and oils  Olive oil. Canola oil. Sunflower oil. Grapeseed oil. Avocado. Walnuts.  Sweets and desserts  Sugar-free or low-fat pudding. Sugar-free or low-fat ice cream and other frozen treats.  Seasoning and other foods  Herbs. Sodium-free spices. Mustard. Relish. Low-fat, low-sugar ketchup. Low-fat, low-sugar barbecue sauce. Low-fat or fat-free mayonnaise.  What foods are not recommended?  The items listed below may not be a complete list. Talk with your dietitian about what dietary choices are best for you.  Grains  Refined white flour and flour products, such as bread, pasta, snack foods, and cereals.  Vegetables  Canned vegetables. Frozen vegetables with butter or cream sauce.  Fruits  Fruits canned with syrup.  Meats and other protein foods  Fatty cuts of meat. Poultry with skin. Breaded or fried meat. Processed meats.  Dairy  Full-fat yogurt, cheese, or milk.  Beverages  Sweetened drinks, such as sweet iced tea and soda.  Fats and oils  Butter. Lard. Ghee.  Sweets and desserts  Baked goods, such as cake, cupcakes, pastries, cookies, and cheesecake.  Seasoning and other foods  Spice mixes with added salt. Ketchup. Barbecue sauce. Mayonnaise.  Summary  · To prevent diabetes from developing, you may need to make diet and other lifestyle changes to help control blood sugar, improve cholesterol levels, and manage your blood pressure.  · Set weight loss goals with the help of your health care team. It is recommended that most people with prediabetes lose 7 percent of their current  body weight.  · Consider following a Mediterranean diet that includes plenty of fresh fruits and vegetables, whole grains, beans, nuts, seeds, fish, lean meat, low-fat dairy, and healthy oils.  This information is not intended to replace advice given to you by your health care provider. Make sure you discuss any questions you have with your health care provider.  Document Revised: 04/10/2020 Document Reviewed: 02/21/2018  [employer] Patient Education © 2020 [employer]

## 2020-11-25 NOTE — PROGRESS NOTES
Subjective   Marcia Shi is a 45 y.o. female.   Chief Complaint   Patient presents with   • Depression   • Insomnia   • Annual Exam   • Vitamin D Deficiency   • Allergic Rhinitis       History of Present Illness     #1 CPE-patient is here for complete physical exam without GYN evaluation.  She also would like to follow-up on depression anxiety, insomnia, allergic rhinitis and vitamin D deficiency.  She has no complaints.  There is no change in medical or surgical history from last office visit.  There is no change in prescription medications.  Over-the-counter she takes multivitamin, vitamin D, cranberry tablets and melatonin.  She is not allergic to any medications.  She does not use tobacco products, she drinks on average 3 drinks a year, no drugs.  No regular exercise.  She has dental appointments every 6 months.  Last eye exam was in .  She uses [facility].  She saw her GYN in 2020.  She had Pap smear, pelvic exam and breast exam.  She is getting mammogram and breast ultrasound every 6 months and it is managed by her GYN.  Last was in 2020.  She had tetanus vaccine in .  She wants to get flu vaccine today.      #2 Depression with anxiety-on Celexa 40 mg a day.  Patient takes it everyday.  She has no side effects.    Her mother  in summer from cancer.  It was very difficult for patient.  Medication helped her.  She is doing better.  She is tired of Covid.  PHQ-9 at 10, NICKY-7 at 10.  She is comfortable at current dose of citalopram.    We attempted weaning off citalopram 2 times in the last 3 years. Patient was not able to function.    #3 Insomnia-she is on trazodone at 50 mg at bedtime and melatonin at 10 mg.  She had problems with falling asleep.  Trazodone and melatonin helped.  She gets between 8 to 10 hours of sleep.  She is not drowsy in the mornings.     #4 AR- patient is on Flonase as needed and Zyrtec.  She also takes Singulair every day.  She reports no side effects.  No  nosebleeds.  Symptoms are controlled.     #5 vitamin D deficiency-she takes vitamin D3 at 2500 units a day.  Vitamin D level is normal at 37.9.    #6  Fasting blood sugar at 102.  BMI 35.1.    The following portions of the patient's history were reviewed and updated as appropriate: allergies, current medications, past family history, past medical history, past social history, past surgical history and problem list.    Review of Systems   Constitutional: Negative for chills and fever.   HENT: Negative.    Respiratory: Negative for cough and shortness of breath.    Cardiovascular: Negative for chest pain.   Gastrointestinal: Negative for blood in stool.   Genitourinary: Negative for hematuria.   Neurological: Negative.    Psychiatric/Behavioral: Negative for suicidal ideas.         Objective   Wt Readings from Last 3 Encounters:   11/25/20 108 kg (238 lb)   03/18/20 110 kg (242 lb)   11/22/19 106 kg (234 lb)      Vitals:    11/25/20 0718   BP: 106/70   Pulse: 105   Temp: 98 °F (36.7 °C)   SpO2: 98%     Temp Readings from Last 3 Encounters:   11/25/20 98 °F (36.7 °C)   03/02/20 98.3 °F (36.8 °C)   11/22/19 98 °F (36.7 °C)     BP Readings from Last 3 Encounters:   11/25/20 106/70   03/18/20 127/90   03/02/20 112/78     Pulse Readings from Last 3 Encounters:   11/25/20 105   03/18/20 73   03/02/20 82     Body mass index is 35.13 kg/m².    Physical Exam  Vitals signs reviewed.   Constitutional:       General: She is not in acute distress.     Appearance: She is well-developed. She is obese. She is not diaphoretic.   HENT:      Head: Normocephalic and atraumatic. Hair is normal.      Right Ear: Hearing, tympanic membrane, ear canal and external ear normal. No decreased hearing noted. No drainage.      Left Ear: Hearing, tympanic membrane, ear canal and external ear normal. No decreased hearing noted.   Eyes:      General: Lids are normal.         Right eye: No discharge.         Left eye: No discharge.       Conjunctiva/sclera: Conjunctivae normal.      Pupils: Pupils are equal, round, and reactive to light.   Neck:      Musculoskeletal: Normal range of motion and neck supple.      Thyroid: No thyromegaly.      Vascular: No JVD.      Trachea: No tracheal deviation.   Cardiovascular:      Rate and Rhythm: Normal rate and regular rhythm.      Pulses: Normal pulses.      Heart sounds: Normal heart sounds. No murmur. No friction rub. No gallop.    Pulmonary:      Effort: Pulmonary effort is normal. No respiratory distress.      Breath sounds: Normal breath sounds. No wheezing or rales.   Chest:      Chest wall: No tenderness.   Abdominal:      General: Bowel sounds are normal. There is no distension.      Palpations: Abdomen is soft. There is no mass.      Tenderness: There is no abdominal tenderness. There is no guarding or rebound.      Hernia: No hernia is present.   Musculoskeletal: Normal range of motion.         General: No tenderness or deformity.   Lymphadenopathy:      Cervical: No cervical adenopathy.   Skin:     General: Skin is warm and dry.      Findings: No erythema or rash.   Neurological:      Mental Status: She is alert and oriented to person, place, and time.      Cranial Nerves: No cranial nerve deficit.      Motor: No abnormal muscle tone.      Coordination: Coordination normal.      Deep Tendon Reflexes: Reflexes are normal and symmetric. Reflexes normal.   Psychiatric:         Behavior: Behavior normal.         Thought Content: Thought content normal.         Judgment: Judgment normal.         Assessment/Plan   Diagnoses and all orders for this visit:    1. Physical exam, annual (Primary)    2. Other insomnia    3. Mixed anxiety depressive disorder    4. Vitamin D deficiency    5. Non-seasonal allergic rhinitis, unspecified trigger    6. Screening for colon cancer  -     Ambulatory Referral to Gastroenterology    7. IFG (impaired fasting glucose)  -     Basic Metabolic Panel; Future  -     Hemoglobin  A1c; Future    Other orders  -     traZODone (DESYREL) 50 MG tablet; Take 1 tablet by mouth Every Night.  Dispense: 90 tablet; Refill: 1  -     citalopram (CeleXA) 40 MG tablet; Take 1 tablet by mouth Daily.  Dispense: 90 tablet; Refill: 1  -     fluticasone (FLONASE) 50 MCG/ACT nasal spray; 2 sprays into the nostril(s) as directed by provider Daily.  Dispense: 1 bottle; Refill: 5  -     montelukast (SINGULAIR) 10 MG tablet; Take 1 tablet by mouth Every Night.  Dispense: 90 tablet; Refill: 1        #1 CPE-preventing counseling provided.  Patient is getting flu vaccine today and tetanus vaccine.  She is due for colon cancer screening and we are referring her for colonoscopy.  Obesity increases risk for developing diabetes and heart disease.  We talked about dietary changes, about my fitness pal, about food diary, portion control.  She is advised to exercise at least 30 minutes a day, 5 days a week.  Continue regular dental appointments and eye exams.  Sun protection discussed and recommended.    2.  Depression anxiety-continue current treatment.  She is comfortable at this dose.  Follow-up in 6 months.  3.  Insomnia-continue current treatment.  Follow-up in 6 months.  4.  Impaired fasting glucose-weight loss can help with decreasing risk of developing diabetes.  Information provided.  Follow-up in 6 months.  5.  Vitamin D deficiency-continue current treatment.  Follow-up in 12 months.  6.  Allergic rhinitis-we discussed potential psychiatric side effects of Singulair.  I advised patient to use Flonase every day and see if she still needs Singulair.  Patient is in agreement.

## 2021-02-09 DIAGNOSIS — N60.02 BREAST CYST, LEFT: ICD-10-CM

## 2021-02-09 DIAGNOSIS — N63.20 BREAST MASS, LEFT: ICD-10-CM

## 2021-02-09 DIAGNOSIS — Z09 FOLLOW-UP EXAM, 3-6 MONTHS SINCE PREVIOUS EXAM: Primary | ICD-10-CM

## 2021-02-15 ENCOUNTER — APPOINTMENT (OUTPATIENT)
Dept: WOMENS IMAGING | Facility: HOSPITAL | Age: 46
End: 2021-02-15

## 2021-02-15 DIAGNOSIS — N63.20 BREAST MASS, LEFT: ICD-10-CM

## 2021-02-15 DIAGNOSIS — N60.02 BREAST CYST, LEFT: ICD-10-CM

## 2021-02-15 DIAGNOSIS — Z09 FOLLOW-UP EXAM, 3-6 MONTHS SINCE PREVIOUS EXAM: ICD-10-CM

## 2021-02-15 PROCEDURE — 76641 ULTRASOUND BREAST COMPLETE: CPT | Performed by: RADIOLOGY

## 2021-02-15 PROCEDURE — G0279 TOMOSYNTHESIS, MAMMO: HCPCS | Performed by: RADIOLOGY

## 2021-02-15 PROCEDURE — 77062 BREAST TOMOSYNTHESIS BI: CPT | Performed by: RADIOLOGY

## 2021-02-15 PROCEDURE — 77066 DX MAMMO INCL CAD BI: CPT | Performed by: RADIOLOGY

## 2021-03-23 ENCOUNTER — OFFICE VISIT (OUTPATIENT)
Dept: OBSTETRICS AND GYNECOLOGY | Facility: CLINIC | Age: 46
End: 2021-03-23

## 2021-03-23 VITALS
BODY MASS INDEX: 36.14 KG/M2 | WEIGHT: 244 LBS | HEART RATE: 76 BPM | DIASTOLIC BLOOD PRESSURE: 89 MMHG | SYSTOLIC BLOOD PRESSURE: 129 MMHG | HEIGHT: 69 IN

## 2021-03-23 DIAGNOSIS — N93.9 ABNORMAL UTERINE BLEEDING (AUB): Primary | ICD-10-CM

## 2021-03-23 PROCEDURE — 99396 PREV VISIT EST AGE 40-64: CPT | Performed by: OBSTETRICS & GYNECOLOGY

## 2021-03-23 RX ORDER — TRANEXAMIC ACID 650 MG/1
TABLET ORAL
Qty: 30 TABLET | Refills: 2 | Status: SHIPPED | OUTPATIENT
Start: 2021-03-23 | End: 2021-06-22

## 2021-03-23 NOTE — PROGRESS NOTES
"SUBJECTIVE:   Chief Complaint   Patient presents with   • Annual Exam     pap: 3/2020 NIL -HPV, mammo: 2021, colonoscopy: plans to         Marcia Shi is a 46 y.o.  who presents for an annual examination     Pap history:  Last pap: 2020 NIL, HPV negative 2018 NIL, HPV negative  Prior abnormal paps: no  STDs  Sexually active: yes  History of STDs: no  Contraception:  None  Still the same - regular periods, 6 days of bleeding every 28 days, 2 days of heavy (changing a pad or tampon every 2 hours), after lighter.  She reports they have been this way for a while but she is wanting to help improve the amount of bleeding the first two days.    Pertinent PMH: denies migraine with aura, hypertension, family or personal history of clotting disorder, denies smoking      Had mammogram 2021 - cyst noted and recommended repeat in 2021    History of physical abuse: no, History of sexual abuse: no and History of verbal/emotional abuse: no    Screening for BRCA-   Is patient's family history significant for BRCA risk factors? No  Mother was just diagnosed with \"Cervical Cancer\" at age 79 in [place].  She is undergoing chemo    Past Medical History:   Diagnosis Date   • Ganglion cyst of wrist    • UTI (urinary tract infection)    • Vitamin D deficiency      Past Surgical History:   Procedure Laterality Date   •  SECTION     • TONSILLECTOMY       OB History    Para Term  AB Living   1 1 1     1   SAB TAB Ectopic Molar Multiple Live Births             1      # Outcome Date GA Lbr José Miguel/2nd Weight Sex Delivery Anes PTL Lv   1 Term     F CS-LTranv   KASSIDY      Social History     Tobacco Use   • Smoking status: Former Smoker   • Smokeless tobacco: Never Used   • Tobacco comment: quit 15 yrs ago   Substance Use Topics   • Alcohol use: Yes     Comment: occa   • Drug use: Never     Family History   Problem Relation Age of Onset   • Hypertension Mother    • Cervical cancer Mother 79 "   • Colon cancer Father 70   • Lymphoma Father    • Breast cancer Neg Hx    • Ovarian cancer Neg Hx    • Uterine cancer Neg Hx    • Deep vein thrombosis Neg Hx    • Pulmonary embolism Neg Hx      Current Outpatient Medications on File Prior to Visit   Medication Sig Dispense Refill   • cetirizine (ZyrTEC) 10 MG tablet Take 10 mg by mouth daily.     • cholecalciferol (VITAMIN D3) 1000 UNITS tablet Take by mouth.     • citalopram (CeleXA) 40 MG tablet Take 1 tablet by mouth Daily. 90 tablet 1   • CRANBERRY PO Take by mouth.     • fluticasone (FLONASE) 50 MCG/ACT nasal spray 2 sprays into the nostril(s) as directed by provider Daily. 1 bottle 5   • Melatonin 5 MG tablet dispersible Take by mouth.     • Multiple Vitamin (MULTIVITAMIN) capsule Take by mouth.     • traZODone (DESYREL) 50 MG tablet Take 1 tablet by mouth Every Night. 90 tablet 1   • [DISCONTINUED] montelukast (SINGULAIR) 10 MG tablet Take 1 tablet by mouth Every Night. 90 tablet 1     No current facility-administered medications on file prior to visit.     No Known Allergies     Review of Systems   Constitutional: Negative for chills, fever and unexpected weight change.   HENT: Negative for congestion, nosebleeds and sore throat.    Eyes: Negative for visual disturbance.   Respiratory: Negative for cough, chest tightness and shortness of breath.    Cardiovascular: Negative for chest pain and palpitations.   Gastrointestinal: Negative for abdominal pain, constipation, diarrhea, nausea and vomiting.   Endocrine: Negative for polyuria.   Genitourinary: Negative for difficulty urinating, dyspareunia, dysuria, frequency, genital sores, hematuria, menstrual problem, pelvic pain, urgency, vaginal bleeding, vaginal discharge and vaginal pain.   Musculoskeletal: Negative for arthralgias, joint swelling and myalgias.   Skin: Negative for color change and rash.   Neurological: Negative for dizziness, light-headedness and headaches.   Hematological: Does not  "bruise/bleed easily.   Psychiatric/Behavioral: Negative for dysphoric mood. The patient is not nervous/anxious.          OBJECTIVE:   Vitals:    03/23/21 1328   BP: 129/89   Pulse: 76   Weight: 111 kg (244 lb)   Height: 175.3 cm (69.02\")      Physical Exam   Constitutional: She is oriented to person, place, and time. She appears well-developed and well-nourished. No distress.   HENT:   Head: Normocephalic and atraumatic.   Eyes: No scleral icterus.   Neck: No thyromegaly present.   Cardiovascular: Normal rate and regular rhythm. Exam reveals no gallop and no friction rub.   No murmur heard.  Pulmonary/Chest: Effort normal and breath sounds normal. No respiratory distress. She has no wheezes. She has no rales. She exhibits no tenderness. Right breast exhibits no inverted nipple. Left breast exhibits no inverted nipple. No breast swelling, tenderness, discharge or bleeding. Breasts are symmetrical.   Abdominal: Soft. Bowel sounds are normal. She exhibits no distension. There is no abdominal tenderness. There is no guarding.   Genitourinary: Vagina normal and uterus normal. There is no rash, tenderness, lesion, injury or Bartholin's cyst on the right labia. There is no rash, tenderness, lesion, injury or Bartholin's cyst on the left labia. Uterus is not mobile.   Cervix is not parous. Cervix does not exhibit motion tenderness, discharge, friability, lesion, polyp, nabothian cyst, eversion, pinkness or cyanosis. Right adnexum displays no mass, no tenderness and no fullness. Right adnexum is present.Left adnexum displays no mass, no tenderness and no fullness. Left adnexum is present.No vaginal discharge found.   Musculoskeletal: No deformity.   Neurological: She is alert and oriented to person, place, and time.   Skin: Skin is warm and dry. She is not diaphoretic.   Psychiatric: Her speech is normal and behavior is normal. Judgment and thought content normal.       ASSESSMENT/PLAN:   1. Encounter for well woman exam " with routine gynecological exam        Well woman counseling/screening:    Cervical cancer screening:    Reports no h/o cervical dysplasia   The patient is due for a pap in 2025.    Breast cancer screening:    Clinical breast exam recommended for age 20-39 years every 1-3 years   Mammogram recommended starting age 40, aware to schedule in Aug 2021 for repeat imaging   Breast self awareness encouraged   Recommend mammogram to be scheduled one year from previous  STD Screening   Patient desired no testing.    Contraception :   Declines    Family history    does not demonstrate need for genetics referral   Healthy lifestyle counseling:   return for routine annual checkups   Body mass index is 36.02 kg/m². Encouraged weight loss with dietary changes, exercise   Encouraged continued smoking cessation  AUB: Reviewed with patient possible etiologies. She declines further work up at this time, but I encouraged an US and EMB if not improved with this medication or if she had any irregular bleeding. She agrees.    Colonoscopy: planning for colonoscpoy

## 2021-03-26 DIAGNOSIS — Z09 FOLLOW-UP EXAM, 3-6 MONTHS SINCE PREVIOUS EXAM: Primary | ICD-10-CM

## 2021-04-06 ENCOUNTER — BULK ORDERING (OUTPATIENT)
Dept: CASE MANAGEMENT | Facility: OTHER | Age: 46
End: 2021-04-06

## 2021-04-06 DIAGNOSIS — Z23 IMMUNIZATION DUE: ICD-10-CM

## 2021-05-25 RX ORDER — CITALOPRAM 40 MG/1
TABLET ORAL
Qty: 90 TABLET | Refills: 0 | Status: SHIPPED | OUTPATIENT
Start: 2021-05-25 | End: 2021-06-01 | Stop reason: SDUPTHER

## 2021-06-01 ENCOUNTER — OFFICE VISIT (OUTPATIENT)
Dept: INTERNAL MEDICINE | Facility: CLINIC | Age: 46
End: 2021-06-01

## 2021-06-01 VITALS
BODY MASS INDEX: 36.6 KG/M2 | WEIGHT: 247.1 LBS | HEART RATE: 82 BPM | SYSTOLIC BLOOD PRESSURE: 110 MMHG | HEIGHT: 69 IN | DIASTOLIC BLOOD PRESSURE: 66 MMHG | TEMPERATURE: 97.1 F | OXYGEN SATURATION: 97 %

## 2021-06-01 DIAGNOSIS — J30.89 NON-SEASONAL ALLERGIC RHINITIS, UNSPECIFIED TRIGGER: ICD-10-CM

## 2021-06-01 DIAGNOSIS — R73.01 IFG (IMPAIRED FASTING GLUCOSE): ICD-10-CM

## 2021-06-01 DIAGNOSIS — E66.9 CLASS 2 OBESITY WITHOUT SERIOUS COMORBIDITY WITH BODY MASS INDEX (BMI) OF 36.0 TO 36.9 IN ADULT, UNSPECIFIED OBESITY TYPE: ICD-10-CM

## 2021-06-01 DIAGNOSIS — F41.8 MIXED ANXIETY DEPRESSIVE DISORDER: Primary | ICD-10-CM

## 2021-06-01 DIAGNOSIS — G47.09 OTHER INSOMNIA: ICD-10-CM

## 2021-06-01 PROCEDURE — 99214 OFFICE O/P EST MOD 30 MIN: CPT | Performed by: FAMILY MEDICINE

## 2021-06-01 RX ORDER — CITALOPRAM 40 MG/1
40 TABLET ORAL DAILY
Qty: 90 TABLET | Refills: 1 | Status: SHIPPED | OUTPATIENT
Start: 2021-06-01 | End: 2021-08-22 | Stop reason: SDUPTHER

## 2021-06-01 RX ORDER — FLUTICASONE PROPIONATE 50 MCG
2 SPRAY, SUSPENSION (ML) NASAL DAILY
Qty: 16 G | Refills: 5 | Status: SHIPPED | OUTPATIENT
Start: 2021-06-01 | End: 2022-04-10 | Stop reason: SDUPTHER

## 2021-06-01 RX ORDER — TRAZODONE HYDROCHLORIDE 50 MG/1
50 TABLET ORAL NIGHTLY
Qty: 90 TABLET | Refills: 1 | Status: SHIPPED | OUTPATIENT
Start: 2021-06-01 | End: 2021-12-01

## 2021-06-01 NOTE — PROGRESS NOTES
Subjective   Marcia Shi is a 46 y.o. female.   Chief Complaint   Patient presents with   • Anxiety   • Depression   • IGF   • Insomnia       History of Present Illness     #1 Depression with anxiety-on Celexa 40 mg a day.  Patient takes it everyday.  She has no side effects.  No suicidal ideations, no aggressive behaviors.  Mood is normal most of the time.  No excessive worries. PHQ-9 at 4, NICKY-7 at 4.  She is comfortable at current dose of citalopram.     We attempted weaning off citalopram 2 times in the last 3 years. Patient was not able to function.     #2 Insomnia-she is on trazodone at 50 mg at bedtime and melatonin at 10 mg.  She had problems with falling asleep.  Trazodone and melatonin help.  She gets between 9 hours of sleep.  She is not drowsy in the mornings.     #3 AR- patient is on Flonase and Zyrtec.  She decided to stop singular after we talked about blackbox warnings at last office visit.  She started using Flonase every day instead of as needed and she reports no problems with allergies.  Symptoms are controlled.  No congestion, no runny nose.  She has no side effects. No nosebleeds.        #4 Fasting blood sugar at 103.  A1c 5.4.  BMI 36.5 from 35.1.    The following portions of the patient's history were reviewed and updated as appropriate: allergies, current medications, past family history, past medical history, past social history, past surgical history and problem list.    Review of Systems   Constitutional: Negative.    Respiratory: Negative.    Cardiovascular: Negative.    Psychiatric/Behavioral: Negative for suicidal ideas. The patient is not nervous/anxious.          Objective   Wt Readings from Last 3 Encounters:   06/01/21 112 kg (247 lb 1.6 oz)   03/23/21 111 kg (244 lb)   11/25/20 108 kg (238 lb)      Vitals:    06/01/21 0721   BP: 110/66   Pulse: 82   Temp: 97.1 °F (36.2 °C)   SpO2: 97%     Temp Readings from Last 3 Encounters:   06/01/21 97.1 °F (36.2 °C)   11/25/20 98 °F  (36.7 °C)   03/02/20 98.3 °F (36.8 °C)     BP Readings from Last 3 Encounters:   06/01/21 110/66   03/23/21 129/89   11/25/20 106/70     Pulse Readings from Last 3 Encounters:   06/01/21 82   03/23/21 76   11/25/20 105     Body mass index is 36.47 kg/m².    Physical Exam  Constitutional:       Appearance: She is well-developed. She is obese.   HENT:      Head: Normocephalic and atraumatic.   Neck:      Thyroid: No thyromegaly.      Vascular: No carotid bruit.   Cardiovascular:      Rate and Rhythm: Normal rate and regular rhythm.      Heart sounds: Normal heart sounds.   Pulmonary:      Effort: Pulmonary effort is normal.      Breath sounds: Normal breath sounds.   Musculoskeletal:      Cervical back: Neck supple.   Skin:     General: Skin is warm and dry.   Neurological:      Mental Status: She is alert and oriented to person, place, and time.   Psychiatric:         Behavior: Behavior normal.         Assessment/Plan   Diagnoses and all orders for this visit:    1. Mixed anxiety depressive disorder (Primary)    2. IFG (impaired fasting glucose)    3. Other insomnia    4. Non-seasonal allergic rhinitis, unspecified trigger    5. Class 2 obesity without serious comorbidity with body mass index (BMI) of 36.0 to 36.9 in adult, unspecified obesity type    Other orders  -     citalopram (CeleXA) 40 MG tablet; Take 1 tablet by mouth Daily.  Dispense: 90 tablet; Refill: 1  -     fluticasone (FLONASE) 50 MCG/ACT nasal spray; 2 sprays into the nostril(s) as directed by provider Daily.  Dispense: 16 g; Refill: 5  -     traZODone (DESYREL) 50 MG tablet; Take 1 tablet by mouth Every Night.  Dispense: 90 tablet; Refill: 1        #1 depression anxiety-continue current treatment.  Follow-up in 6 months.  2.  Insomnia-continue same.  Follow-up in 6 months.  3.  Impaired fasting glucose-weight loss can decrease risk of developing diabetes.  Follow-up in 6 months.  4.  Allergic rhinitis-continue same.  Follow-up in 12 months.  5.   Obesity-increased risk for developing diabetes and heart disease.  Decrease portion size.  Start to exercise at least 30 minutes a day, 5 days a week.  We discussed my fitness pal, noom and weight watchers.

## 2021-06-22 ENCOUNTER — OFFICE VISIT (OUTPATIENT)
Dept: OBSTETRICS AND GYNECOLOGY | Facility: CLINIC | Age: 46
End: 2021-06-22

## 2021-06-22 VITALS
HEART RATE: 76 BPM | DIASTOLIC BLOOD PRESSURE: 79 MMHG | SYSTOLIC BLOOD PRESSURE: 112 MMHG | WEIGHT: 243 LBS | BODY MASS INDEX: 35.99 KG/M2 | HEIGHT: 69 IN

## 2021-06-22 DIAGNOSIS — N93.9 ABNORMAL UTERINE BLEEDING (AUB): Primary | ICD-10-CM

## 2021-06-22 DIAGNOSIS — N94.6 DYSMENORRHEA: ICD-10-CM

## 2021-06-22 LAB
B-HCG UR QL: NEGATIVE
INTERNAL NEGATIVE CONTROL: NORMAL
INTERNAL POSITIVE CONTROL: NORMAL
Lab: NORMAL

## 2021-06-22 PROCEDURE — 99213 OFFICE O/P EST LOW 20 MIN: CPT | Performed by: OBSTETRICS & GYNECOLOGY

## 2021-06-22 PROCEDURE — 81025 URINE PREGNANCY TEST: CPT | Performed by: OBSTETRICS & GYNECOLOGY

## 2021-06-22 RX ORDER — NORETHINDRONE ACETATE AND ETHINYL ESTRADIOL 1MG-20(21)
1 KIT ORAL DAILY
Qty: 84 TABLET | Refills: 1 | Status: SHIPPED | OUTPATIENT
Start: 2021-06-22 | End: 2021-12-14

## 2021-06-22 NOTE — PROGRESS NOTES
SUBJECTIVE:   Chief Complaint   Patient presents with   • Menorrhagia     Pt is following up on u/s due to menorrhagia        Marcia Shi is a 46 y.o.  who presents for AUB. Reports that last week she had a six day period that was more painful (more cramping) than previous.  The bleeding has been going on this year.  Her last period was the worst.  Her bleeding is regular.  Taking TXA for 2 days a cycle but still having pain. On heaviest day changes super tampon every 4 hours.  Denies intermenstrual bleeding.     Pertinent PMH: denies migraine with aura, hypertension, family or personal history of clotting disorder, denies smoking    Patient's last menstrual period was 2021 (exact date).     Past Medical History:   Diagnosis Date   • Ganglion cyst of wrist    • UTI (urinary tract infection)    • Vitamin D deficiency      Past Surgical History:   Procedure Laterality Date   •  SECTION     • TONSILLECTOMY       OB History    Para Term  AB Living   1 1 1     1   SAB TAB Ectopic Molar Multiple Live Births             1      # Outcome Date GA Lbr José Miguel/2nd Weight Sex Delivery Anes PTL Lv   1 Term     F CS-LTranv   KASSIDY      Social History     Tobacco Use   • Smoking status: Former Smoker   • Smokeless tobacco: Never Used   • Tobacco comment: quit 15 yrs ago   Substance Use Topics   • Alcohol use: Yes     Comment: occa   • Drug use: Never     Family History   Problem Relation Age of Onset   • Hypertension Mother    • Cervical cancer Mother 79   • Colon cancer Father 70   • Lymphoma Father    • Breast cancer Neg Hx    • Ovarian cancer Neg Hx    • Uterine cancer Neg Hx    • Deep vein thrombosis Neg Hx    • Pulmonary embolism Neg Hx      Current Outpatient Medications on File Prior to Visit   Medication Sig Dispense Refill   • cetirizine (ZyrTEC) 10 MG tablet Take 10 mg by mouth daily.     • cholecalciferol (VITAMIN D3) 1000 UNITS tablet Take by mouth.     • citalopram (CeleXA) 40 MG  "tablet Take 1 tablet by mouth Daily. 90 tablet 1   • CRANBERRY PO Take by mouth.     • fluticasone (FLONASE) 50 MCG/ACT nasal spray 2 sprays into the nostril(s) as directed by provider Daily. 16 g 5   • Melatonin 5 MG tablet dispersible Take by mouth.     • Multiple Vitamin (MULTIVITAMIN) capsule Take by mouth.     • traZODone (DESYREL) 50 MG tablet Take 1 tablet by mouth Every Night. 90 tablet 1   • [DISCONTINUED] Tranexamic Acid 650 MG tablet 1300mg TID x 5 days (during menses) 30 tablet 2     No current facility-administered medications on file prior to visit.     No Known Allergies     Review of Systems   Constitutional: Negative for activity change, appetite change, fatigue, fever and unexpected weight change.   Gastrointestinal: Negative for abdominal pain, nausea and vomiting.   Genitourinary: Positive for menstrual problem and vaginal bleeding. Negative for vaginal discharge and vaginal pain.   Hematological: Does not bruise/bleed easily.   Psychiatric/Behavioral: Negative for agitation.         OBJECTIVE:   Vitals:    06/22/21 0838   BP: 112/79   Pulse: 76   Weight: 110 kg (243 lb)   Height: 175.3 cm (69.02\")      Physical Exam  Constitutional:       General: She is not in acute distress.     Appearance: She is well-developed. She is not diaphoretic.   HENT:      Head: Normocephalic and atraumatic.   Eyes:      General: No scleral icterus.     Extraocular Movements: Extraocular movements intact.   Pulmonary:      Effort: Pulmonary effort is normal. No respiratory distress.   Skin:     General: Skin is warm and dry.   Neurological:      General: No focal deficit present.      Mental Status: She is alert and oriented to person, place, and time.   Psychiatric:         Mood and Affect: Mood normal.         Behavior: Behavior normal.         Thought Content: Thought content normal.         Judgment: Judgment normal.         ASSESSMENT/PLAN:     ICD-10-CM ICD-9-CM   1. Abnormal uterine bleeding (AUB)  N93.9 626.9 "   2. Dysmenorrhea  N94.6 625.3     Patient was counseled on possible etiologies including endometrial pathology (polyps, fibroids, hyperplasia, malignancy) as well as hormonal (ovulatory dysfunction, thyroid dysfunction, etc.)  We discussed risks of anemia if bleeding heavy and untreated, including syncope, dizziness, and lightheadedness.   Reviewed that we see a fibroid on ultrasound that is the likely cause of her bleeding.  She was also counseled on treatment options including medication (such as oral contraceptive pills, progestins, and/or TXA), intrauterine device (Mirena), endometrial ablation and hysterectomy.  We discussed that treatment options may be limited due to size of fibroid. At this time, she would like to try an oral contraceptive pills and has no contraindication. Aware of risks such as nausea, vomiting, hypertension, headache, increased risk of venous thromboembolism.  She was encouraged if a side effect is arise she should stop the medication and seek medical attention.       Orders Placed This Encounter   Procedures   • POC Pregnancy, Urine     Order Specific Question:   Release to patient     Answer:   Immediate        Orders Placed This Encounter   Procedures   • POC Pregnancy, Urine     Order Specific Question:   Release to patient     Answer:   Immediate       Return in about 3 months (around 9/22/2021).

## 2021-07-28 ENCOUNTER — TELEPHONE (OUTPATIENT)
Dept: OBSTETRICS AND GYNECOLOGY | Facility: CLINIC | Age: 46
End: 2021-07-28

## 2021-08-23 RX ORDER — CITALOPRAM 40 MG/1
40 TABLET ORAL DAILY
Qty: 90 TABLET | Refills: 1 | Status: SHIPPED | OUTPATIENT
Start: 2021-08-23 | End: 2021-12-01 | Stop reason: SDUPTHER

## 2021-08-26 ENCOUNTER — APPOINTMENT (OUTPATIENT)
Dept: WOMENS IMAGING | Facility: HOSPITAL | Age: 46
End: 2021-08-26

## 2021-08-26 PROCEDURE — 76641 ULTRASOUND BREAST COMPLETE: CPT | Performed by: RADIOLOGY

## 2021-08-27 DIAGNOSIS — Z09 FOLLOW-UP EXAM, 3-6 MONTHS SINCE PREVIOUS EXAM: ICD-10-CM

## 2021-08-30 ENCOUNTER — TELEPHONE (OUTPATIENT)
Dept: OBSTETRICS AND GYNECOLOGY | Facility: CLINIC | Age: 46
End: 2021-08-30

## 2021-08-30 NOTE — TELEPHONE ENCOUNTER
LM for pt to call about recent Breast US.    Results were stable and suggest she return in 6 mths for a follow up at [facility] in Feb 2022 for DX Bilat Mammo & Left US. I also have on file to follow.  SR

## 2021-08-31 NOTE — TELEPHONE ENCOUNTER
Pt returned call and Dr Mason also messaged her in [facility].  She is aware of results and will call C to schedule.   SR

## 2021-09-29 ENCOUNTER — TELEPHONE (OUTPATIENT)
Dept: GASTROENTEROLOGY | Facility: CLINIC | Age: 46
End: 2021-09-29

## 2021-10-04 ENCOUNTER — PREP FOR SURGERY (OUTPATIENT)
Dept: SURGERY | Facility: SURGERY CENTER | Age: 46
End: 2021-10-04

## 2021-10-04 DIAGNOSIS — Z80.0 FAMILY HISTORY OF COLON CANCER: ICD-10-CM

## 2021-10-04 DIAGNOSIS — Z12.11 ENCOUNTER FOR SCREENING FOR MALIGNANT NEOPLASM OF COLON: Primary | ICD-10-CM

## 2021-10-04 DIAGNOSIS — Z83.71 FAMILY HISTORY OF COLONIC POLYPS: ICD-10-CM

## 2021-10-04 PROBLEM — Z83.719 FAMILY HISTORY OF COLONIC POLYPS: Status: ACTIVE | Noted: 2021-10-04

## 2021-10-04 RX ORDER — SODIUM CHLORIDE 0.9 % (FLUSH) 0.9 %
10 SYRINGE (ML) INJECTION AS NEEDED
Status: CANCELLED | OUTPATIENT
Start: 2021-10-04

## 2021-10-04 RX ORDER — SODIUM CHLORIDE, SODIUM LACTATE, POTASSIUM CHLORIDE, CALCIUM CHLORIDE 600; 310; 30; 20 MG/100ML; MG/100ML; MG/100ML; MG/100ML
30 INJECTION, SOLUTION INTRAVENOUS CONTINUOUS PRN
Status: CANCELLED | OUTPATIENT
Start: 2021-10-04

## 2021-10-04 RX ORDER — SODIUM CHLORIDE 0.9 % (FLUSH) 0.9 %
3 SYRINGE (ML) INJECTION EVERY 12 HOURS SCHEDULED
Status: CANCELLED | OUTPATIENT
Start: 2021-10-04

## 2021-11-22 DIAGNOSIS — Z00.00 ANNUAL PHYSICAL EXAM: Primary | ICD-10-CM

## 2021-11-24 LAB
ALBUMIN SERPL-MCNC: 4.4 G/DL (ref 3.8–4.8)
ALBUMIN/GLOB SERPL: 1.7 {RATIO} (ref 1.2–2.2)
ALP SERPL-CCNC: 56 IU/L (ref 44–121)
ALT SERPL-CCNC: 26 IU/L (ref 0–32)
AST SERPL-CCNC: 29 IU/L (ref 0–40)
BILIRUB SERPL-MCNC: 0.5 MG/DL (ref 0–1.2)
BUN SERPL-MCNC: 11 MG/DL (ref 6–24)
BUN/CREAT SERPL: 12 (ref 9–23)
CALCIUM SERPL-MCNC: 9.4 MG/DL (ref 8.7–10.2)
CHLORIDE SERPL-SCNC: 101 MMOL/L (ref 96–106)
CHOLEST SERPL-MCNC: 215 MG/DL (ref 100–199)
CO2 SERPL-SCNC: 21 MMOL/L (ref 20–29)
CREAT SERPL-MCNC: 0.89 MG/DL (ref 0.57–1)
ERYTHROCYTE [DISTWIDTH] IN BLOOD BY AUTOMATED COUNT: 12.8 % (ref 11.7–15.4)
GLOBULIN SER CALC-MCNC: 2.6 G/DL (ref 1.5–4.5)
GLUCOSE SERPL-MCNC: 94 MG/DL (ref 65–99)
HCT VFR BLD AUTO: 40.9 % (ref 34–46.6)
HDLC SERPL-MCNC: 54 MG/DL
HGB BLD-MCNC: 13.5 G/DL (ref 11.1–15.9)
LDLC SERPL CALC-MCNC: 134 MG/DL (ref 0–99)
LDLC/HDLC SERPL: 2.5 RATIO (ref 0–3.2)
MCH RBC QN AUTO: 29.3 PG (ref 26.6–33)
MCHC RBC AUTO-ENTMCNC: 33 G/DL (ref 31.5–35.7)
MCV RBC AUTO: 89 FL (ref 79–97)
PLATELET # BLD AUTO: 363 X10E3/UL (ref 150–450)
POTASSIUM SERPL-SCNC: 4.3 MMOL/L (ref 3.5–5.2)
PROT SERPL-MCNC: 7 G/DL (ref 6–8.5)
RBC # BLD AUTO: 4.6 X10E6/UL (ref 3.77–5.28)
SODIUM SERPL-SCNC: 138 MMOL/L (ref 134–144)
TRIGL SERPL-MCNC: 154 MG/DL (ref 0–149)
TSH SERPL DL<=0.005 MIU/L-ACNC: 2.85 UIU/ML (ref 0.45–4.5)
VLDLC SERPL CALC-MCNC: 27 MG/DL (ref 5–40)
WBC # BLD AUTO: 7.4 X10E3/UL (ref 3.4–10.8)

## 2021-12-01 ENCOUNTER — OFFICE VISIT (OUTPATIENT)
Dept: INTERNAL MEDICINE | Facility: CLINIC | Age: 46
End: 2021-12-01

## 2021-12-01 VITALS
WEIGHT: 240 LBS | HEIGHT: 69 IN | HEART RATE: 81 BPM | BODY MASS INDEX: 35.55 KG/M2 | TEMPERATURE: 97.3 F | SYSTOLIC BLOOD PRESSURE: 116 MMHG | OXYGEN SATURATION: 95 % | DIASTOLIC BLOOD PRESSURE: 70 MMHG

## 2021-12-01 DIAGNOSIS — F41.8 MIXED ANXIETY DEPRESSIVE DISORDER: ICD-10-CM

## 2021-12-01 DIAGNOSIS — R73.01 IFG (IMPAIRED FASTING GLUCOSE): ICD-10-CM

## 2021-12-01 DIAGNOSIS — Z00.00 PHYSICAL EXAM, ANNUAL: Primary | ICD-10-CM

## 2021-12-01 DIAGNOSIS — E55.9 VITAMIN D DEFICIENCY: ICD-10-CM

## 2021-12-01 DIAGNOSIS — G47.09 OTHER INSOMNIA: ICD-10-CM

## 2021-12-01 PROCEDURE — 99214 OFFICE O/P EST MOD 30 MIN: CPT | Performed by: FAMILY MEDICINE

## 2021-12-01 PROCEDURE — 99396 PREV VISIT EST AGE 40-64: CPT | Performed by: FAMILY MEDICINE

## 2021-12-01 RX ORDER — CITALOPRAM 40 MG/1
40 TABLET ORAL DAILY
Qty: 90 TABLET | Refills: 1 | Status: SHIPPED | OUTPATIENT
Start: 2021-12-01 | End: 2022-08-17 | Stop reason: SDUPTHER

## 2021-12-01 NOTE — PROGRESS NOTES
Subjective   Marcia Shi is a 46 y.o. female.   Chief Complaint   Patient presents with   • Annual Exam   • Anxiety   • Hyperglycemia   • Insomnia       History of Present Illness     #1 CPE-patient is here for complete physical exam without GYN evaluation.  She also would like to follow-up on depression anxiety, insomnia and hyperglycemia.    She has no complaints.    In 2021 she was diagnosed with uterine fibroid.  She had heavy periods.  She was put on hormonal treatment.  It helped with decreasing bleeding.  Otherwise there is no change in medical or surgical history from last office visit.  No change in family history.  Both of her parents are .  She has no siblings.  There is no change in prescription medications other than her starting hormonal treatment.  Over-the-counter she takes multivitamin, vitamin D, cranberry tablets and melatonin and Zyrtec.  She is not allergic to any medications.  She does not use tobacco products, she drinks on average few drinks a year, no drugs.  She has a new dog.  She walks every day for 30 minutes.  She lost 7 pounds from 2021.  She has dental appointments every 6 months.   She saw her GYN in 2021.  She is up-to-date with Pap smears and mammograms. She is getting mammogram and breast ultrasound every 6 months and it is managed by her GYN.   She had tetanus vaccine in 2020.  She had Covid vaccine x3.  She had flu vaccine this season.  She is scheduled for colonoscopy on 2021.    #2 Depression with anxiety-on Celexa 40 mg a day.  Patient takes it everyday.  She has no side effects.  No suicidal ideations, no aggressive behaviors.  Mood is normal most of the time.  No excessive worries.  Work is going well.  She moved to the new house.  She has a new puppy.  PHQ-9 at 4, NICKY-7 at 3.  She is comfortable at current dose of citalopram.     We attempted weaning off citalopram 2 times in the last 3 years. Patient was not able to  function.     #3 Insomnia-she is on trazodone at 50 mg at bedtime and melatonin at 10 mg.  She had problems with falling asleep.  She is interested in discontinuation of trazodone.  She feels that melatonin is doing a good job.  Over last week she started using trazodone at half tablet at bedtime and did not see any difference.  She gets on average 8 hours of sleep at night.  She has no side effects.    #4 IFG- Fasting blood sugar at  94 from 103.   BMI 35.4.    The following portions of the patient's history were reviewed and updated as appropriate: allergies, current medications, past family history, past medical history, past social history, past surgical history and problem list.    Review of Systems   Respiratory: Negative.  Negative for cough, shortness of breath and wheezing.    Cardiovascular: Negative for chest pain.   Gastrointestinal: Negative for blood in stool.   Genitourinary: Negative for hematuria.   Psychiatric/Behavioral: Negative for suicidal ideas.         Objective   Wt Readings from Last 3 Encounters:   12/01/21 109 kg (240 lb)   06/22/21 110 kg (243 lb)   06/01/21 112 kg (247 lb 1.6 oz)      Vitals:    12/01/21 0802   BP: 116/70   Pulse: 81   Temp: 97.3 °F (36.3 °C)   SpO2: 95%     Temp Readings from Last 3 Encounters:   12/01/21 97.3 °F (36.3 °C)   06/01/21 97.1 °F (36.2 °C)   11/25/20 98 °F (36.7 °C)     BP Readings from Last 3 Encounters:   12/01/21 116/70   06/22/21 112/79   06/01/21 110/66     Pulse Readings from Last 3 Encounters:   12/01/21 81   06/22/21 76   06/01/21 82     Body mass index is 35.43 kg/m².    Physical Exam  Vitals reviewed.   Constitutional:       General: She is not in acute distress.     Appearance: She is well-developed. She is obese. She is not diaphoretic.   HENT:      Head: Normocephalic and atraumatic. Hair is normal.      Right Ear: Hearing, tympanic membrane, ear canal and external ear normal. No decreased hearing noted. No drainage.      Left Ear: Hearing,  tympanic membrane, ear canal and external ear normal. No decreased hearing noted.   Eyes:      General: Lids are normal.         Right eye: No discharge.         Left eye: No discharge.      Conjunctiva/sclera: Conjunctivae normal.      Pupils: Pupils are equal, round, and reactive to light.   Neck:      Thyroid: No thyromegaly.      Vascular: No JVD.      Trachea: No tracheal deviation.   Cardiovascular:      Rate and Rhythm: Normal rate and regular rhythm.      Pulses: Normal pulses.      Heart sounds: Normal heart sounds. No murmur heard.  No friction rub. No gallop.    Pulmonary:      Effort: Pulmonary effort is normal. No respiratory distress.      Breath sounds: Normal breath sounds. No wheezing or rales.   Chest:      Chest wall: No tenderness.   Breasts:      Right: No supraclavicular adenopathy.      Left: No supraclavicular adenopathy.       Abdominal:      General: There is no distension.      Palpations: Abdomen is soft. There is no mass.      Tenderness: There is no abdominal tenderness. There is no guarding or rebound.      Hernia: No hernia is present.   Musculoskeletal:         General: No tenderness or deformity. Normal range of motion.      Cervical back: Normal range of motion and neck supple.   Lymphadenopathy:      Cervical: No cervical adenopathy.      Upper Body:      Right upper body: No supraclavicular adenopathy.      Left upper body: No supraclavicular adenopathy.   Skin:     General: Skin is warm and dry.      Findings: No erythema or rash.   Neurological:      Mental Status: She is alert and oriented to person, place, and time.      Cranial Nerves: No cranial nerve deficit.      Motor: No abnormal muscle tone.      Coordination: Coordination normal.      Deep Tendon Reflexes: Reflexes are normal and symmetric. Reflexes normal.   Psychiatric:         Behavior: Behavior normal.         Thought Content: Thought content normal.         Judgment: Judgment normal.         Assessment/Plan    Diagnoses and all orders for this visit:    1. Physical exam, annual (Primary)    2. Mixed anxiety depressive disorder    3. IFG (impaired fasting glucose)  -     Basic Metabolic Panel; Future  -     Hemoglobin A1c; Future    4. Other insomnia    5. Vitamin D deficiency  -     Vitamin D 25 Hydroxy; Future    Other orders  -     citalopram (CeleXA) 40 MG tablet; Take 1 tablet by mouth Daily.  Dispense: 90 tablet; Refill: 1        #1 CPE-preventing counseling provided.  Blood work results reviewed with patient.  She lost a few pounds since she started walking her dog.  I encouraged her to decrease amount of sweets in her diet, decrease amount of carbs.  Continue regular exercise at least 30 minutes a day, 5 days a week.  Preferably twice a day.  Continue regular dental appointments every 6 months at least.  She is up-to-date with vaccinations.  She is scheduled for colonoscopy in December.  Sun protection discussed and recommended.    2.  Depression anxiety-continue current treatment.  Follow-up in 6 months.  3.  Insomnia-we will continue melatonin at current dose.  She will stop trazodone.  If she notices problems with falling asleep after discontinuation she will call me and will send it to pharmacy for her.  Follow-up in 6 months.  4.  Impaired fasting glucose-better after small weight loss.  Patient is encouraged to continue to work on losing weight.  Will check labs in 6 months before office visit.

## 2021-12-14 RX ORDER — NORETHINDRONE ACETATE AND ETHINYL ESTRADIOL 1MG-20(21)
KIT ORAL
Qty: 84 TABLET | Refills: 1 | Status: SHIPPED | OUTPATIENT
Start: 2021-12-14 | End: 2022-05-18

## 2021-12-15 RX ORDER — NORETHINDRONE ACETATE AND ETHINYL ESTRADIOL 1MG-20(21)
1 KIT ORAL DAILY
Qty: 84 TABLET | Refills: 1 | OUTPATIENT
Start: 2021-12-15

## 2021-12-15 NOTE — TELEPHONE ENCOUNTER
Called [employer] and left her a message that Dr Mason sent in the refill yesterday afternoon to her [facility] pharmacy. Requested that she call the pharmacy to make sure it is ready and then pick it up.

## 2021-12-30 ENCOUNTER — HOSPITAL ENCOUNTER (OUTPATIENT)
Facility: SURGERY CENTER | Age: 46
Setting detail: HOSPITAL OUTPATIENT SURGERY
Discharge: HOME | End: 2021-12-30
Attending: INTERNAL MEDICINE | Admitting: INTERNAL MEDICINE

## 2021-12-30 ENCOUNTER — ANESTHESIA (OUTPATIENT)
Dept: SURGERY | Facility: SURGERY CENTER | Age: 46
End: 2021-12-30

## 2021-12-30 ENCOUNTER — ANESTHESIA EVENT (OUTPATIENT)
Dept: SURGERY | Facility: SURGERY CENTER | Age: 46
End: 2021-12-30

## 2021-12-30 VITALS
OXYGEN SATURATION: 98 % | SYSTOLIC BLOOD PRESSURE: 122 MMHG | HEART RATE: 62 BPM | BODY MASS INDEX: 34.96 KG/M2 | HEIGHT: 69 IN | DIASTOLIC BLOOD PRESSURE: 91 MMHG | WEIGHT: 236 LBS | TEMPERATURE: 97.3 F | RESPIRATION RATE: 18 BRPM

## 2021-12-30 DIAGNOSIS — Z80.0 FAMILY HISTORY OF COLON CANCER: ICD-10-CM

## 2021-12-30 DIAGNOSIS — Z12.11 ENCOUNTER FOR SCREENING FOR MALIGNANT NEOPLASM OF COLON: ICD-10-CM

## 2021-12-30 DIAGNOSIS — Z83.71 FAMILY HISTORY OF COLONIC POLYPS: ICD-10-CM

## 2021-12-30 LAB
B-HCG UR QL: NEGATIVE
EXPIRATION DATE: NORMAL
INTERNAL NEGATIVE CONTROL: NEGATIVE
INTERNAL POSITIVE CONTROL: POSITIVE
Lab: NORMAL

## 2021-12-30 PROCEDURE — 0DBK8ZZ EXCISION OF ASCENDING COLON, VIA NATURAL OR ARTIFICIAL OPENING ENDOSCOPIC: ICD-10-PCS | Performed by: INTERNAL MEDICINE

## 2021-12-30 PROCEDURE — 45385 COLONOSCOPY W/LESION REMOVAL: CPT | Performed by: INTERNAL MEDICINE

## 2021-12-30 PROCEDURE — 25010000002 PROPOFOL 10 MG/ML EMULSION: Performed by: NURSE ANESTHETIST, CERTIFIED REGISTERED

## 2021-12-30 PROCEDURE — 81025 URINE PREGNANCY TEST: CPT | Performed by: INTERNAL MEDICINE

## 2021-12-30 PROCEDURE — 88305 TISSUE EXAM BY PATHOLOGIST: CPT | Performed by: INTERNAL MEDICINE

## 2021-12-30 RX ORDER — SODIUM CHLORIDE 0.9 % (FLUSH) 0.9 %
3 SYRINGE (ML) INJECTION EVERY 12 HOURS SCHEDULED
Status: DISCONTINUED | OUTPATIENT
Start: 2021-12-30 | End: 2021-12-30 | Stop reason: HOSPADM

## 2021-12-30 RX ORDER — SODIUM CHLORIDE, SODIUM LACTATE, POTASSIUM CHLORIDE, CALCIUM CHLORIDE 600; 310; 30; 20 MG/100ML; MG/100ML; MG/100ML; MG/100ML
1000 INJECTION, SOLUTION INTRAVENOUS CONTINUOUS
Status: DISCONTINUED | OUTPATIENT
Start: 2021-12-30 | End: 2021-12-30 | Stop reason: HOSPADM

## 2021-12-30 RX ORDER — LIDOCAINE HYDROCHLORIDE 10 MG/ML
0.5 INJECTION, SOLUTION INFILTRATION; PERINEURAL ONCE AS NEEDED
Status: DISCONTINUED | OUTPATIENT
Start: 2021-12-30 | End: 2021-12-30 | Stop reason: HOSPADM

## 2021-12-30 RX ORDER — MAGNESIUM HYDROXIDE 1200 MG/15ML
LIQUID ORAL AS NEEDED
Status: DISCONTINUED | OUTPATIENT
Start: 2021-12-30 | End: 2021-12-30 | Stop reason: HOSPADM

## 2021-12-30 RX ORDER — SODIUM CHLORIDE 0.9 % (FLUSH) 0.9 %
10 SYRINGE (ML) INJECTION AS NEEDED
Status: DISCONTINUED | OUTPATIENT
Start: 2021-12-30 | End: 2021-12-30 | Stop reason: HOSPADM

## 2021-12-30 RX ORDER — PROPOFOL 10 MG/ML
VIAL (ML) INTRAVENOUS AS NEEDED
Status: DISCONTINUED | OUTPATIENT
Start: 2021-12-30 | End: 2021-12-30 | Stop reason: SURG

## 2021-12-30 RX ORDER — LIDOCAINE HYDROCHLORIDE 20 MG/ML
INJECTION, SOLUTION INFILTRATION; PERINEURAL AS NEEDED
Status: DISCONTINUED | OUTPATIENT
Start: 2021-12-30 | End: 2021-12-30 | Stop reason: SURG

## 2021-12-30 RX ADMIN — SODIUM CHLORIDE, POTASSIUM CHLORIDE, SODIUM LACTATE AND CALCIUM CHLORIDE 1000 ML: 600; 310; 30; 20 INJECTION, SOLUTION INTRAVENOUS at 09:58

## 2021-12-30 RX ADMIN — PROPOFOL INJECTABLE EMULSION 200 MCG/KG/MIN: 10 INJECTION, EMULSION INTRAVENOUS at 10:44

## 2021-12-30 RX ADMIN — PROPOFOL 100 MG: 10 INJECTION, EMULSION INTRAVENOUS at 10:44

## 2021-12-30 RX ADMIN — LIDOCAINE HYDROCHLORIDE 60 MG: 20 INJECTION, SOLUTION INFILTRATION; PERINEURAL at 10:44

## 2021-12-30 NOTE — ANESTHESIA PREPROCEDURE EVALUATION
Anesthesia Evaluation     Patient summary reviewed and Nursing notes reviewed   NPO Solid Status: > 8 hours  NPO Liquid Status: > 2 hours           Airway   Mallampati: II  Dental - normal exam     Pulmonary - normal exam   Cardiovascular - normal exam        Neuro/Psych  (+) psychiatric history Anxiety and Depression,     GI/Hepatic/Renal/Endo    (+) obesity,       Musculoskeletal     Abdominal    Substance History      OB/GYN          Other                        Anesthesia Plan    ASA 2     MAC       Anesthetic plan, all risks, benefits, and alternatives have been provided, discussed and informed consent has been obtained with: patient.

## 2021-12-30 NOTE — ANESTHESIA POSTPROCEDURE EVALUATION
"Patient: Marcia Shi    Procedure Summary     Date: 12/30/21 Room / Location: [facility] / [facility]    Anesthesia Start: 1041 Anesthesia Stop: 1129    Procedure: COLONOSCOPY with polypectomy (N/A ) Diagnosis:       Encounter for screening for malignant neoplasm of colon      Family history of colonic polyps      Family history of colon cancer      (Encounter for screening for malignant neoplasm of colon [Z12.11])      (Family history of colonic polyps [Z83.71])      (Family history of colon cancer [Z80.0])    Surgeons: Eleuterio Crawford MD Provider: Bhavna Og MD    Anesthesia Type: MAC ASA Status: 2          Anesthesia Type: MAC    Vitals  Vitals Value Taken Time   /91 12/30/21 1148   Temp 36.3 °C (97.3 °F) 12/30/21 1127   Pulse 62 12/30/21 1148   Resp 18 12/30/21 1148   SpO2 98 % 12/30/21 1148           Post Anesthesia Care and Evaluation    Patient location during evaluation: PHASE II  Patient participation: complete - patient participated  Level of consciousness: awake  Pain management: adequate  Airway patency: patent  Anesthetic complications: No anesthetic complications    Cardiovascular status: acceptable  Respiratory status: acceptable  Hydration status: acceptable    Comments: /91 (BP Location: Left arm, Patient Position: Sitting)   Pulse 62   Temp 36.3 °C (97.3 °F) (Temporal)   Resp 18   Ht 175.3 cm (69\")   Wt 107 kg (236 lb)   SpO2 98%   BMI 34.85 kg/m²       "

## 2021-12-31 LAB
LAB AP CASE REPORT: NORMAL
LAB AP CLINICAL INFORMATION: NORMAL
LAB AP DIAGNOSIS COMMENT: NORMAL
PATH REPORT.FINAL DX SPEC: NORMAL
PATH REPORT.GROSS SPEC: NORMAL

## 2022-01-14 ENCOUNTER — TELEPHONE (OUTPATIENT)
Dept: INTERNAL MEDICINE | Facility: CLINIC | Age: 47
End: 2022-01-14

## 2022-01-14 RX ORDER — TRAZODONE HYDROCHLORIDE 50 MG/1
50 TABLET ORAL NIGHTLY
Qty: 90 TABLET | Refills: 1 | Status: SHIPPED | OUTPATIENT
Start: 2022-01-14 | End: 2022-07-17 | Stop reason: SDUPTHER

## 2022-01-14 NOTE — TELEPHONE ENCOUNTER
Caller: Marcia Shi    Relationship: Self    Best call back number: [phone]    What medication are you requesting: TRAZADONE 50 MG     Have you had these symptoms before:    [x] Yes  [] No    Have you been treated for these symptoms before:   [x] Yes  [] No    If a prescription is needed, what is your preferred pharmacy and phone number: [facility] [address] [id number], [place] - [address] [facility] AT [address] [phone] [facility] [phone]      Additional notes: NOT ON MED LIST, PATIENT IS OUT OF MEDS

## 2022-04-08 ENCOUNTER — TELEMEDICINE (OUTPATIENT)
Dept: FAMILY MEDICINE CLINIC | Facility: TELEHEALTH | Age: 47
End: 2022-04-08

## 2022-04-08 DIAGNOSIS — J01.10 ACUTE FRONTAL SINUSITIS, RECURRENCE NOT SPECIFIED: Primary | ICD-10-CM

## 2022-04-08 DIAGNOSIS — R05.9 COUGH: ICD-10-CM

## 2022-04-08 PROCEDURE — 99213 OFFICE O/P EST LOW 20 MIN: CPT | Performed by: NURSE PRACTITIONER

## 2022-04-08 RX ORDER — AMOXICILLIN AND CLAVULANATE POTASSIUM 875; 125 MG/1; MG/1
1 TABLET, FILM COATED ORAL 2 TIMES DAILY
Qty: 20 TABLET | Refills: 0 | Status: SHIPPED | OUTPATIENT
Start: 2022-04-08 | End: 2022-04-19

## 2022-04-08 NOTE — PATIENT INSTRUCTIONS
Cough, Adult  A cough helps to clear your throat and lungs. A cough may be a sign of an illness or another medical condition.  An acute cough may only last 2-3 weeks, while a chronic cough may last 8 or more weeks.  Many things can cause a cough. They include:  Germs (viruses or bacteria) that attack the airway.  Breathing in things that bother (irritate) your lungs.  Allergies.  Asthma.  Mucus that runs down the back of your throat (postnasal drip).  Smoking.  Acid backing up from the stomach into the tube that moves food from the mouth to the stomach (gastroesophageal reflux).  Some medicines.  Lung problems.  Other medical conditions, such as heart failure or a blood clot in the lung (pulmonary embolism).  Follow these instructions at home:  Medicines  Take over-the-counter and prescription medicines only as told by your doctor.  Talk with your doctor before you take medicines that stop a cough (cough suppressants).  Lifestyle    Do not smoke, and try not to be around smoke. Do not use any products that contain nicotine or tobacco, such as cigarettes, e-cigarettes, and chewing tobacco. If you need help quitting, ask your doctor.  Drink enough fluid to keep your pee (urine) pale yellow.  Avoid caffeine.  Do not drink alcohol if your doctor tells you not to drink.    General instructions    Watch for any changes in your cough. Tell your doctor about them.  Always cover your mouth when you cough.  Stay away from things that make you cough, such as perfume, candles, campfire smoke, or cleaning products.  If the air is dry, use a cool mist vaporizer or humidifier in your home.  If your cough is worse at night, try using extra pillows to raise your head up higher while you sleep.  Rest as needed.  Keep all follow-up visits as told by your doctor. This is important.    Contact a doctor if:  You have new symptoms.  You cough up pus.  Your cough does not get better after 2-3 weeks, or your cough gets worse.  Cough  medicine does not help your cough and you are not sleeping well.  You have pain that gets worse or pain that is not helped with medicine.  You have a fever.  You are losing weight and you do not know why.  You have night sweats.  Get help right away if:  You cough up blood.  You have trouble breathing.  Your heartbeat is very fast.  These symptoms may be an emergency. Do not wait to see if the symptoms will go away. Get medical help right away. Call your local emergency services (911 in the [place] Do not drive yourself to the hospital.  Summary  A cough helps to clear your throat and lungs. Many things can cause a cough.  Take over-the-counter and prescription medicines only as told by your doctor.  Always cover your mouth when you cough.  Contact a doctor if you have new symptoms or you have a cough that does not get better or gets worse.  This information is not intended to replace advice given to you by your health care provider. Make sure you discuss any questions you have with your health care provider.  Document Revised: 02/05/2021 Document Reviewed: 01/06/2020  Elsevier Patient Education © 2021 [employer]

## 2022-04-08 NOTE — PROGRESS NOTES
You have chosen to receive care through a telehealth visit.  Do you consent to use a video/audio connection for your medical care today? Yes     CHIEF COMPLAINT  Chief Complaint   Patient presents with   • Sinusitis     Facial pain and nasal congestion         HPI  Marcia Shi is a 47 y.o. female  presents with complaint of 7 day h/o nasal congestion with post nasal drainage which is causing a sore throat. She feels it has worsened over the last 3 days with sweats and chills, chest congestion and mild cough. She tested negative for Covid 19 with a home rapid Covid test. She feels as though she has a sinus infection. She is taking OTC sinus medication and Flonase nasal spray for symptoms.     Review of Systems   Constitutional: Positive for activity change, appetite change, chills and fatigue. Fever: tactile fever with sweats and chills.    HENT: Positive for congestion (nasal ), postnasal drip, rhinorrhea, sinus pressure (frontal), sinus pain (tenderness above and below eyes) and sore throat. Negative for ear pain, facial swelling and hearing loss.    Respiratory: Positive for cough (mild chest congestion and cough with occasional yellow phlegm).    Cardiovascular: Negative.    Gastrointestinal: Negative.    Musculoskeletal: Negative.    Skin: Negative.    Allergic/Immunologic: Positive for environmental allergies.   Neurological: Positive for headaches.   Hematological: Negative.    Psychiatric/Behavioral: Negative.        Past Medical History:   Diagnosis Date   • Ganglion cyst of wrist    • UTI (urinary tract infection)    • Vitamin D deficiency        Family History   Problem Relation Age of Onset   • Hypertension Mother    • Cervical cancer Mother 79   • Colon cancer Father 70   • Lymphoma Father    • Breast cancer Neg Hx    • Ovarian cancer Neg Hx    • Uterine cancer Neg Hx    • Deep vein thrombosis Neg Hx    • Pulmonary embolism Neg Hx        Social History     Socioeconomic History   • Marital status:     Tobacco Use   • Smoking status: Former Smoker   • Smokeless tobacco: Never Used   • Tobacco comment: quit 15 yrs ago   Vaping Use   • Vaping Use: Never used   Substance and Sexual Activity   • Alcohol use: Yes     Comment: occa   • Drug use: Never   • Sexual activity: Yes     Partners: Male     Birth control/protection: Condom       Marcia Shi  reports that she has quit smoking. She has never used smokeless tobacco..      There were no vitals taken for this visit.    PHYSICAL EXAM  Physical Exam   Constitutional: She is oriented to person, place, and time. She appears well-developed and well-nourished. She does not have a sickly appearance. She does not appear ill. No distress.   HENT:   Head: Normocephalic and atraumatic.   Nose: Mucosal edema and rhinorrhea present. Right sinus exhibits maxillary sinus tenderness and frontal sinus tenderness. Left sinus exhibits maxillary sinus tenderness and frontal sinus tenderness.   Mouth/Throat: Mouth/Lips are normal.  Pulmonary/Chest: Effort normal.  No respiratory distress.  Neurological: She is alert and oriented to person, place, and time.   Psychiatric: She has a normal mood and affect.   Vitals reviewed.      Results for orders placed or performed during the hospital encounter of 12/30/21   POC Pregnancy, Urine    Specimen: Urine   Result Value Ref Range    HCG, Urine, QL Negative Negative    Lot Number [id number]     Internal Positive Control Positive Positive, Passed    Internal Negative Control Negative Negative, Passed    Expiration Date 131/2,023    Tissue Pathology Exam    Specimen: A: Large Intestine, Transverse Colon; Polyp    B: Large Intestine, Right / Ascending Colon; Polyp    C: Large Intestine, Cecum; Polyp    D: Large Intestine, Left / Descending Colon; Polyp    E: Large Intestine, Sigmoid Colon; Polyp   Result Value Ref Range    Case Report       Surgical Pathology Report                         Case: [id number]                                   Authorizing Provider:  Eleuterio Crawford MD    Collected:           12/30/2021 10:51 AM          Ordering Location:     [facility]     Received:            12/30/2021 11:31 AM                                 [facility] MAIN OR                                                     Pathologist:           Zhen Rosenbaum MD                                                         Specimens:   1) - Large Intestine, Transverse Colon, transverse colon polyp X9                                   2) - Large Intestine, Right / Ascending Colon, ascending colon polyp X2                             3) - Large Intestine, Cecum, cecal polyp                                                            4) - Large Intestine, Left / Descending Colon, descending colon polyp X3                            5) - Large Intestine, Sigmoid Colon, sigmoid colon polyp X2                                Clinical Information       transverse colon polyp X9  6 polyps in trap 1      Final Diagnosis       1. Transverse Colon Polyps x9 Biopsy:  A. Fragments of hyperplastic polyp and fragment of sessile serrated lesion/polyp (see comment).  B. No glandular dysplasia nor malignancy identified.     2. Right Ascending Colon Polyp Biopsy:   A. Fragment of eroded mixed hyperplastic polyp and tubular adenoma.   B. No high-grade glandular dysplasia nor malignancy identified.    3. Cecal Polyp Biopsy:    A. Hyperplastic polyp with focal features suggesting sessile serrated lesion/polyp (see comment).   B. No glandular dysplasia nor malignancy identified.    4. Left Descending Colon Polyp Biopsy:   A. Hyperplastic polyp.  B. No glandular dysplasia nor malignancy identified.    5. Sigmoid Colon Polyps x2 Biopsy:  A. Fragments of tubular adenoma.  B. Fragments of hyperplastic polyps.  C. No high-grade dysplasia nor malignancy identified.     angelest/albert       Comment       The biopsies from part 1 (clinically transverse colon polyps x 9) and  "part 3 (clinically cecal polyp) shows similar histologic features with focal splaying of deep colonic crypts morphologically consistent with sessile serrated lesion/polyp. There is no glandular dysplasia identified in any of the reviewed sections. Continued clinical surveillance is recommended.     jeanine/albert        Gross Description       1. The specimen is received in formalin labeled with the patient's name and designated \"transverse colon polyp x 9 are multiple irregular pink tan to red soft tissue fragments measuring 2.5 x 0.6 x 0.4 cm.  The smaller fragments are submitted as received.  The largest fragments are focally erythematous.  The smaller fragments are filtered and submitted as received in 1A.  The remaining largest fragments are each serially sectioned through the long axis and submitted in 1B.      2. The specimen is received in formalin labeled with the patient's name and further designated 'right ascending colon polyp biopsy' are multiple small fragments of gray-tan tissue. The specimen is submitted for embedding as received.    3. The specimen is received in formalin labeled with the patient's name and further designated 'cecal polyp biopsy' are multiple small fragments of gray-tan tissue. The specimen is submitted for embedding as received.    4. The specimen is received in formalin labeled with the patient's name and further designated 'left descending colon polyp biopsy' are multiple small fragments of gray-tan tissue. The specimen is submitted for embedding as received.    5.  The specimen is received in formalin labeled with the patient's name and designated \"sigmoid colon polyp x2\" are 2 irregular pink tan soft tissue masses measuring 0.3 x 0.2 x 0.1 cm and 0.9 x 0.9 x 0.2 cm. The small polyp is submitted as received.  The larger polyp is multilobulated and friable.  It is attached to a 0.2 cm stalk.  The base of the stalk is inked and the specimen is bisected through the base and submitted with " the other tissue in 5A.      Total blocks this case: 6    mb/uso/mec/brb         Diagnoses and all orders for this visit:    1. Acute frontal sinusitis, recurrence not specified (Primary)  -     amoxicillin-clavulanate (Augmentin) 875-125 MG per tablet; Take 1 tablet by mouth 2 (Two) Times a Day.  Dispense: 20 tablet; Refill: 0    2. Cough    patient declines Covid 19 PCR test today.   Encouraged to increase decaffeinated fluids and rest.     Continue Flonase as directed and sudafed every 12 hours prn nasal congestion.     Follow up prn worsening s/s.   To PCP if no improvement  In 7-10 days.       FOLLOW-UP  As discussed during visit with PCP/[facility] if no improvement or Urgent Care/Emergency Department if worsening of symptoms    Patient verbalizes understanding of medication dosage, comfort measures, instructions for treatment and follow-up.    Migdalia Healy, NICOLE  04/08/2022  07:19 EDT    This visit was performed via Telehealth.  This patient has been instructed to follow-up with their primary care provider if their symptoms worsen or the treatment provided does not resolve their illness.

## 2022-04-11 RX ORDER — FLUTICASONE PROPIONATE 50 MCG
2 SPRAY, SUSPENSION (ML) NASAL DAILY
Qty: 16 G | Refills: 5 | Status: SHIPPED | OUTPATIENT
Start: 2022-04-11

## 2022-04-12 ENCOUNTER — TELEPHONE (OUTPATIENT)
Dept: OBSTETRICS AND GYNECOLOGY | Facility: CLINIC | Age: 47
End: 2022-04-12

## 2022-04-12 NOTE — TELEPHONE ENCOUNTER
Left detailed message for pt to call either [facility] or myself to get scheduled for her overdue DX Bilat Mammo & Left BR US, due in Feb 2022.  SR

## 2022-04-19 ENCOUNTER — OFFICE VISIT (OUTPATIENT)
Dept: INTERNAL MEDICINE | Facility: CLINIC | Age: 47
End: 2022-04-19

## 2022-04-19 VITALS
HEART RATE: 94 BPM | WEIGHT: 247 LBS | TEMPERATURE: 97.3 F | HEIGHT: 69 IN | OXYGEN SATURATION: 96 % | SYSTOLIC BLOOD PRESSURE: 124 MMHG | BODY MASS INDEX: 36.58 KG/M2 | DIASTOLIC BLOOD PRESSURE: 74 MMHG

## 2022-04-19 DIAGNOSIS — J01.90 ACUTE NON-RECURRENT SINUSITIS, UNSPECIFIED LOCATION: ICD-10-CM

## 2022-04-19 DIAGNOSIS — R05.9 COUGH: Primary | ICD-10-CM

## 2022-04-19 PROCEDURE — 99213 OFFICE O/P EST LOW 20 MIN: CPT | Performed by: FAMILY MEDICINE

## 2022-04-19 NOTE — PROGRESS NOTES
Subjective   Marcia Shi is a 47 y.o. female.   Chief Complaint   Patient presents with   • Cough   • Nasal Congestion     For two weeks. Completed antibiotic on Sunday        History of Present Illness     1.  Sinus infection/cough-symptoms started 2 weeks ago.  3 days later patient was evaluated by telehealth.  She was diagnosed with sinus infection and prescribed Augmentin.  She completed 10 days of treatment.  She was better after 2 days. Sinus pressure and pain resolved.  She complains of dry cough.  She feels irritation in her throat and she coughs.  She has no wheezing, no shortness of breath.  Cough keeps her up at night.   She takes Flonase and Zyrtec every day for allergies.    The following portions of the patient's history were reviewed and updated as appropriate: allergies, current medications, past medical history, past social history and problem list.    Review of Systems   Constitutional: Negative.    HENT: Positive for postnasal drip. Negative for sore throat.    Respiratory: Positive for cough. Negative for shortness of breath and wheezing.          Objective   Wt Readings from Last 3 Encounters:   04/19/22 112 kg (247 lb)   12/30/21 107 kg (236 lb)   12/01/21 109 kg (240 lb)      Vitals:    04/19/22 0722   BP: 124/74   Pulse: 94   Temp: 97.3 °F (36.3 °C)   SpO2: 96%     Temp Readings from Last 3 Encounters:   04/19/22 97.3 °F (36.3 °C)   12/30/21 97.3 °F (36.3 °C) (Temporal)   12/01/21 97.3 °F (36.3 °C)     BP Readings from Last 3 Encounters:   04/19/22 124/74   12/30/21 122/91   12/01/21 116/70     Pulse Readings from Last 3 Encounters:   04/19/22 94   12/30/21 62   12/01/21 81     Body mass index is 36.46 kg/m².    Physical Exam  Constitutional:       Appearance: She is well-developed.   HENT:      Head: Normocephalic and atraumatic.      Mouth/Throat:      Pharynx: Oropharynx is clear. No oropharyngeal exudate or posterior oropharyngeal erythema.   Cardiovascular:      Rate and Rhythm:  Normal rate and regular rhythm.      Heart sounds: Normal heart sounds.   Pulmonary:      Effort: Pulmonary effort is normal.      Breath sounds: Normal breath sounds.   Lymphadenopathy:      Cervical: No cervical adenopathy.   Skin:     General: Skin is warm and dry.   Neurological:      Mental Status: She is alert and oriented to person, place, and time.   Psychiatric:         Behavior: Behavior normal.         Assessment/Plan   Diagnoses and all orders for this visit:    1. Cough (Primary)    2. Acute non-recurrent sinusitis, unspecified location        1.  Sinus infection/cough-postnasal drainage and reactive cough postinfectious.  Continue Flonase and Claritin.  Advised Delsym for nighttime cough.  Follow-up as needed.

## 2022-05-16 DIAGNOSIS — Z09 FOLLOW-UP EXAM, 7 MONTHS TO 1 YEAR SINCE PREVIOUS EXAM: Primary | ICD-10-CM

## 2022-05-16 DIAGNOSIS — N63.20 MASS OF LEFT BREAST, UNSPECIFIED QUADRANT: ICD-10-CM

## 2022-05-17 ENCOUNTER — APPOINTMENT (OUTPATIENT)
Dept: WOMENS IMAGING | Facility: HOSPITAL | Age: 47
End: 2022-05-17

## 2022-05-17 PROCEDURE — 76641 ULTRASOUND BREAST COMPLETE: CPT | Performed by: RADIOLOGY

## 2022-05-17 PROCEDURE — G0279 TOMOSYNTHESIS, MAMMO: HCPCS | Performed by: RADIOLOGY

## 2022-05-17 PROCEDURE — 77066 DX MAMMO INCL CAD BI: CPT | Performed by: RADIOLOGY

## 2022-05-17 PROCEDURE — 77062 BREAST TOMOSYNTHESIS BI: CPT | Performed by: RADIOLOGY

## 2022-05-18 RX ORDER — NORETHINDRONE ACETATE AND ETHINYL ESTRADIOL 1MG-20(21)
KIT ORAL
Qty: 84 TABLET | Refills: 1 | Status: SHIPPED | OUTPATIENT
Start: 2022-05-18 | End: 2022-06-27

## 2022-05-24 DIAGNOSIS — N63.20 MASS OF LEFT BREAST, UNSPECIFIED QUADRANT: ICD-10-CM

## 2022-05-24 DIAGNOSIS — Z09 FOLLOW-UP EXAM, 7 MONTHS TO 1 YEAR SINCE PREVIOUS EXAM: ICD-10-CM

## 2022-06-27 ENCOUNTER — OFFICE VISIT (OUTPATIENT)
Dept: OBSTETRICS AND GYNECOLOGY | Facility: CLINIC | Age: 47
End: 2022-06-27

## 2022-06-27 VITALS
HEIGHT: 69 IN | HEART RATE: 66 BPM | BODY MASS INDEX: 36.43 KG/M2 | WEIGHT: 246 LBS | DIASTOLIC BLOOD PRESSURE: 85 MMHG | SYSTOLIC BLOOD PRESSURE: 127 MMHG

## 2022-06-27 DIAGNOSIS — Z01.419 WELL WOMAN EXAM WITH ROUTINE GYNECOLOGICAL EXAM: Primary | ICD-10-CM

## 2022-06-27 PROCEDURE — 99396 PREV VISIT EST AGE 40-64: CPT | Performed by: OBSTETRICS & GYNECOLOGY

## 2022-06-27 RX ORDER — NORETHINDRONE ACETATE AND ETHINYL ESTRADIOL, ETHINYL ESTRADIOL AND FERROUS FUMARATE 1MG-10(24)
1 KIT ORAL DAILY
Qty: 28 TABLET | Refills: 1 | COMMUNITY
Start: 2022-06-27 | End: 2022-08-29 | Stop reason: SDUPTHER

## 2022-06-27 NOTE — PROGRESS NOTES
"SUBJECTIVE:   Chief Complaint   Patient presents with   • Annual Exam     Pt presents today for annual exam. Last annual and pap smear- 2020, last mammogram- 2022.         Marcia Shi is a 47 y.o.  who presents for an annual examination     Pap history:  Last pap: 2020 NIL, HPV negative  Prior abnormal paps: no  STDs  Sexually active: yes  History of STDs: no  Contraception:  On oral contraceptive pill for bleeding, this has improved her periods but she feels that she is having more headaches (sometimes lasting 5 days).    Also c/o weight gain.   She has only had to change her pad/tampon every 4 hours and periods are regular   Mammogram: May 2022  Colonoscopy: Dec 2021- due this year    Had mammogram Dec 2019 - multiple likely benign cysts    History of physical abuse: no, History of sexual abuse: no and History of verbal/emotional abuse: no    Screening for BRCA-   Is patient's family history significant for BRCA risk factors? No  Mother was just diagnosed with \"Cervical Cancer\" at age 79 in [place].  She is undergoing chemo    Past Medical History:   Diagnosis Date   • Ganglion cyst of wrist    • UTI (urinary tract infection)    • Vitamin D deficiency      Past Surgical History:   Procedure Laterality Date   •  SECTION     • COLONOSCOPY N/A 2021    Procedure: COLONOSCOPY with polypectomy;  Surgeon: Eleuterio Crawford MD;  Location: [facility];  Service: Gastroenterology;  Laterality: N/A;  polyps, diverticulosis   • NASAL SEPTUM SURGERY     • TONSILLECTOMY       OB History    Para Term  AB Living   1 1 1     1   SAB IAB Ectopic Molar Multiple Live Births             1      # Outcome Date GA Lbr José Miguel/2nd Weight Sex Delivery Anes PTL Lv   1 Term     F CS-LTranv   KASSIDY      Social History     Tobacco Use   • Smoking status: Former Smoker     Packs/day: 0.50     Years: 10.00     Pack years: 5.00     Quit date: 1/3/2005     Years since quittin.4   • " Complete course of antibiotics. Ibuprofen or Tylenol as needed for pain or fever. Drink plenty of fluids. Seek medical care for worsening symptoms or if symptoms don't improve.     Smokeless tobacco: Never Used   • Tobacco comment: quit 15 yrs ago   Vaping Use   • Vaping Use: Never used   Substance Use Topics   • Alcohol use: Yes     Comment: occa   • Drug use: Never     Family History   Problem Relation Age of Onset   • Hypertension Mother    • Cervical cancer Mother 79   • Colon cancer Father 70   • Lymphoma Father    • Breast cancer Neg Hx    • Ovarian cancer Neg Hx    • Uterine cancer Neg Hx    • Deep vein thrombosis Neg Hx    • Pulmonary embolism Neg Hx      Current Outpatient Medications on File Prior to Visit   Medication Sig Dispense Refill   • Blisovi FE 1/20 1-20 MG-MCG per tablet TAKE ONE TABLET BY MOUTH DAILY 84 tablet 1   • cetirizine (ZyrTEC) 10 MG tablet Take 10 mg by mouth daily.     • cholecalciferol (VITAMIN D3) 1000 UNITS tablet Take by mouth.     • citalopram (CeleXA) 40 MG tablet Take 1 tablet by mouth Daily. 90 tablet 1   • CRANBERRY PO Take by mouth.     • fluticasone (FLONASE) 50 MCG/ACT nasal spray 2 sprays into the nostril(s) as directed by provider Daily. 16 g 5   • Melatonin 5 MG tablet dispersible Take 10 mg by mouth.     • Multiple Vitamin (MULTIVITAMIN) capsule Take by mouth.     • traZODone (DESYREL) 50 MG tablet Take 1 tablet by mouth Every Night. 90 tablet 1     No current facility-administered medications on file prior to visit.     No Known Allergies     Review of Systems   Constitutional: Negative for chills, fever and unexpected weight change.   HENT: Negative for congestion, nosebleeds and sore throat.    Eyes: Negative for visual disturbance.   Respiratory: Negative for cough, chest tightness and shortness of breath.    Cardiovascular: Negative for chest pain and palpitations.   Gastrointestinal: Negative for abdominal pain, constipation, diarrhea, nausea and vomiting.   Endocrine: Negative for polyuria.   Genitourinary: Negative for difficulty urinating, dyspareunia, dysuria, frequency, genital sores, hematuria, menstrual problem, pelvic pain, urgency,  "vaginal bleeding, vaginal discharge and vaginal pain.   Musculoskeletal: Negative for arthralgias, joint swelling and myalgias.   Skin: Negative for color change and rash.   Neurological: Negative for dizziness, light-headedness and headaches.   Hematological: Does not bruise/bleed easily.   Psychiatric/Behavioral: Negative for dysphoric mood. The patient is not nervous/anxious.          OBJECTIVE:   Vitals:    06/27/22 1138   BP: 127/85   Pulse: 66   Weight: 112 kg (246 lb)   Height: 175.3 cm (69.02\")      Physical Exam   Constitutional: She is oriented to person, place, and time. She appears well-developed and well-nourished. No distress.   HENT:   Head: Normocephalic and atraumatic.   Eyes: No scleral icterus.   Neck: No thyromegaly present.   Cardiovascular: Normal rate and regular rhythm. Exam reveals no gallop and no friction rub.   No murmur heard.  Pulmonary/Chest: Effort normal and breath sounds normal. No respiratory distress. She has no wheezes. She has no rales. She exhibits no tenderness. Right breast exhibits no inverted nipple. Left breast exhibits no inverted nipple. No breast swelling, tenderness, discharge or bleeding. Breasts are symmetrical.   Abdominal: Soft. Bowel sounds are normal. She exhibits no distension. There is no abdominal tenderness. There is no guarding.   Genitourinary: Vagina normal and uterus normal. There is no rash, tenderness, lesion, injury or Bartholin's cyst on the right labia. There is no rash, tenderness, lesion, injury or Bartholin's cyst on the left labia. Uterus is not mobile.   Cervix is not parous. Cervix does not exhibit motion tenderness, discharge, friability, lesion, polyp, nabothian cyst, eversion, pinkness or cyanosis. Right adnexum displays no mass, no tenderness and no fullness. Right adnexum is present.Left adnexum displays no mass, no tenderness and no fullness. Left adnexum is present.No vaginal discharge found.   Musculoskeletal: No deformity. "   Neurological: She is alert and oriented to person, place, and time.   Skin: Skin is warm and dry. She is not diaphoretic.   Psychiatric: Her speech is normal and behavior is normal. Judgment and thought content normal.       ASSESSMENT/PLAN:   1. Encounter for well woman exam with routine gynecological exam        Well woman counseling/screening:    Cervical cancer screening:    Reports no h/o cervical dysplasia   The patient is due for a pap in 2023, but she would like to do today  Breast cancer screening:    Clinical breast exam recommended for age 20-39 years every 1-3 years   Mammogram recommended starting age 40   Breast self awareness encouraged   Recommend mammogram to be scheduled one year from previous  STD Screening   Patient desired no testing.    Contraception :   Will try Lo Loestrin, if side effects continue, call office   - reviewed ultrasound which shows fibroid pushing on endometrium. Reviewed this is likely cause for heavy menstrual bleeding. If not improved with LoLoestrin or if side effect arise, may need to review possibility of hysteroscopy or hysterectomy.   Family history    does not demonstrate need for genetics referral   Healthy lifestyle counseling:   return for routine annual checkups   Body mass index is 36.31 kg/m². Encouraged weight loss with dietary changes, exercise   Congratulated on smoking cessation   Reviewed colonoscopy - due Dec 2022

## 2022-07-18 ENCOUNTER — TELEPHONE (OUTPATIENT)
Dept: INTERNAL MEDICINE | Facility: CLINIC | Age: 47
End: 2022-07-18

## 2022-07-18 RX ORDER — TRAZODONE HYDROCHLORIDE 50 MG/1
50 TABLET ORAL NIGHTLY
Qty: 90 TABLET | Refills: 0 | Status: SHIPPED | OUTPATIENT
Start: 2022-07-18 | End: 2022-10-17 | Stop reason: SDUPTHER

## 2022-07-18 NOTE — TELEPHONE ENCOUNTER
PATIENT'S  CALL TO CHECK THE STATUS OF THIS REQUEST - PATIENT HAS BEEN OUT OF MEDICATION SINCE 07/16/22.    PLEASE ADVISE  [phone]

## 2022-07-18 NOTE — TELEPHONE ENCOUNTER
Phoned patient to schedule follow up and labs that she was due for in June had to leave voice message

## 2022-08-17 ENCOUNTER — OFFICE VISIT (OUTPATIENT)
Dept: INTERNAL MEDICINE | Facility: CLINIC | Age: 47
End: 2022-08-17

## 2022-08-17 VITALS
WEIGHT: 241 LBS | BODY MASS INDEX: 35.7 KG/M2 | SYSTOLIC BLOOD PRESSURE: 110 MMHG | TEMPERATURE: 97.5 F | OXYGEN SATURATION: 95 % | HEIGHT: 69 IN | HEART RATE: 93 BPM | DIASTOLIC BLOOD PRESSURE: 70 MMHG

## 2022-08-17 DIAGNOSIS — G47.09 OTHER INSOMNIA: ICD-10-CM

## 2022-08-17 DIAGNOSIS — E55.9 VITAMIN D DEFICIENCY: ICD-10-CM

## 2022-08-17 DIAGNOSIS — R73.01 IFG (IMPAIRED FASTING GLUCOSE): ICD-10-CM

## 2022-08-17 DIAGNOSIS — F41.8 MIXED ANXIETY DEPRESSIVE DISORDER: Primary | ICD-10-CM

## 2022-08-17 DIAGNOSIS — J30.89 NON-SEASONAL ALLERGIC RHINITIS, UNSPECIFIED TRIGGER: ICD-10-CM

## 2022-08-17 PROCEDURE — 99214 OFFICE O/P EST MOD 30 MIN: CPT | Performed by: FAMILY MEDICINE

## 2022-08-17 RX ORDER — CITALOPRAM 40 MG/1
40 TABLET ORAL DAILY
Qty: 90 TABLET | Refills: 1 | Status: SHIPPED | OUTPATIENT
Start: 2022-08-17 | End: 2022-11-21 | Stop reason: SDUPTHER

## 2022-08-17 RX ORDER — BUPROPION HYDROCHLORIDE 100 MG/1
100 TABLET, EXTENDED RELEASE ORAL 2 TIMES DAILY
Qty: 60 TABLET | Refills: 1 | Status: SHIPPED | OUTPATIENT
Start: 2022-08-17 | End: 2022-10-17

## 2022-08-17 NOTE — PROGRESS NOTES
Subjective   Marcia Shi is a 47 y.o. female.   Chief Complaint   Patient presents with   • Depression   • Insomnia   • Vitamin D Deficiency   • Hyperglycemia   • Allergic Rhinitis       History of Present Illness     #1 Depression with anxiety-on Celexa 40 mg a day.  Patient takes it everyday.  She has no side effects.  No suicidal ideations, no aggressive behaviors.  Worsening of depression in the last month.  She has decreased mood, low motivation, decreased concentration.  No triggering event.  No suicidal ideations or aggressive behaviors.  PHQ-9 at 9, NICKY-7 at 12.     We attempted weaning off citalopram 2 times in the last 3 years. Patient was not able to function.     #2 Insomnia-she is on trazodone at 50 mg at bedtime and melatonin at 10 mg.  She tried weaning off after last office visit, but had to get back to the whole tablet of trazodone and melatonin.  She gets about 8 to 8.5 of sleep at night.  She wakes up tired.  She is not drowsy in the mornings.     #3 AR- patient is on Flonase as needed and Zyrtec every day.  Symptoms are controlled.  No congestion, no runny nose.  She has no side effects. No nosebleeds.        #4 Fasting blood sugar at 101.  A1c 5.8 5.4.  BMI 35.6.  She gained 5 pounds in 6 months.    #5  Vitamin D deficiency- she takes vitamin D3 every day.  Vitamin D is at 46.8.    The following portions of the patient's history were reviewed and updated as appropriate: allergies, current medications, past family history, past medical history, past social history, past surgical history and problem list.    Review of Systems   Respiratory: Negative.    Cardiovascular: Negative.    Psychiatric/Behavioral: Negative for suicidal ideas.         Objective   Wt Readings from Last 3 Encounters:   08/17/22 109 kg (241 lb)   06/27/22 112 kg (246 lb)   04/19/22 112 kg (247 lb)      Vitals:    08/17/22 0714   BP: 110/70   Pulse: 93   Temp: 97.5 °F (36.4 °C)   SpO2: 95%     Temp Readings from Last 3  Encounters:   08/17/22 97.5 °F (36.4 °C)   04/19/22 97.3 °F (36.3 °C)   12/30/21 97.3 °F (36.3 °C) (Temporal)     BP Readings from Last 3 Encounters:   08/17/22 110/70   06/27/22 127/85   04/19/22 124/74     Pulse Readings from Last 3 Encounters:   08/17/22 93   06/27/22 66   04/19/22 94     Body mass index is 35.57 kg/m².    Physical Exam  Constitutional:       Appearance: She is well-developed. She is obese.   HENT:      Head: Normocephalic and atraumatic.   Neck:      Thyroid: No thyromegaly.      Vascular: No carotid bruit.   Cardiovascular:      Rate and Rhythm: Normal rate and regular rhythm.      Heart sounds: Normal heart sounds.   Pulmonary:      Effort: Pulmonary effort is normal.      Breath sounds: Normal breath sounds.   Musculoskeletal:      Cervical back: Neck supple.   Skin:     General: Skin is warm and dry.   Neurological:      Mental Status: She is alert and oriented to person, place, and time.   Psychiatric:         Behavior: Behavior normal.         Assessment & Plan   Diagnoses and all orders for this visit:    1. Mixed anxiety depressive disorder (Primary)    2. IFG (impaired fasting glucose)    3. Vitamin D deficiency    4. Other insomnia    5. Non-seasonal allergic rhinitis, unspecified trigger    Other orders  -     buPROPion SR (Wellbutrin SR) 100 MG 12 hr tablet; Take 1 tablet by mouth 2 (Two) Times a Day.  Dispense: 60 tablet; Refill: 1  -     citalopram (CeleXA) 40 MG tablet; Take 1 tablet by mouth Daily.  Dispense: 90 tablet; Refill: 1        1.  Depression with anxiety-uncontrolled.  I recommend counseling.  List of counselors provided.  We are adding Wellbutrin at 100 mg twice a day.  Follow-up in 6 weeks, or sooner if problems.    2.  Insomnia- we will continue current treatment.  I advised referral to sleep specialist as patient is not rested in the mornings after sleeping 8 hours.  She would like to hold with it.  She will let me know if she changes her mind.  Follow-up in 6  months.    3.  Impaired fasting glucose-worsening.  Less carbs would help.  Follow-up in 6 months.  Labs before office visit.    4.  Allergic rhinitis-continue current treatment.  Follow-up in 12 months.    5.  Vitamin D deficiency-continue current treatment.  Follow-up in 12 months

## 2022-08-27 ENCOUNTER — PATIENT MESSAGE (OUTPATIENT)
Dept: OBSTETRICS AND GYNECOLOGY | Facility: CLINIC | Age: 47
End: 2022-08-27

## 2022-08-29 RX ORDER — NORETHINDRONE ACETATE AND ETHINYL ESTRADIOL, ETHINYL ESTRADIOL AND FERROUS FUMARATE 1MG-10(24)
1 KIT ORAL DAILY
Qty: 84 TABLET | Refills: 4 | Status: SHIPPED | OUTPATIENT
Start: 2022-08-29 | End: 2022-11-21 | Stop reason: SDUPTHER

## 2022-08-29 NOTE — TELEPHONE ENCOUNTER
My Chart. AE 6/27/22. You changed her to loestrin Fe because of previous side effects. Wants to continue to use d/t no side effects. She said you told her there is no generic option, but you can work out with her insurance to lower the price. Her last pill is 9/13. How can you start the process.? [facility] pharmacy on file. Thank you.

## 2022-09-06 ENCOUNTER — TELEPHONE (OUTPATIENT)
Dept: GASTROENTEROLOGY | Facility: CLINIC | Age: 47
End: 2022-09-06

## 2022-09-22 ENCOUNTER — PREP FOR SURGERY (OUTPATIENT)
Dept: SURGERY | Facility: SURGERY CENTER | Age: 47
End: 2022-09-22

## 2022-09-22 DIAGNOSIS — Z12.11 ENCOUNTER FOR SCREENING FOR MALIGNANT NEOPLASM OF COLON: Primary | ICD-10-CM

## 2022-09-22 RX ORDER — SODIUM CHLORIDE 0.9 % (FLUSH) 0.9 %
3 SYRINGE (ML) INJECTION EVERY 12 HOURS SCHEDULED
Status: CANCELLED | OUTPATIENT
Start: 2022-09-22

## 2022-09-22 RX ORDER — SODIUM CHLORIDE, SODIUM LACTATE, POTASSIUM CHLORIDE, CALCIUM CHLORIDE 600; 310; 30; 20 MG/100ML; MG/100ML; MG/100ML; MG/100ML
30 INJECTION, SOLUTION INTRAVENOUS CONTINUOUS PRN
Status: CANCELLED | OUTPATIENT
Start: 2022-09-22

## 2022-09-22 RX ORDER — SODIUM CHLORIDE 0.9 % (FLUSH) 0.9 %
10 SYRINGE (ML) INJECTION AS NEEDED
Status: CANCELLED | OUTPATIENT
Start: 2022-09-22

## 2022-10-17 ENCOUNTER — OFFICE VISIT (OUTPATIENT)
Dept: INTERNAL MEDICINE | Facility: CLINIC | Age: 47
End: 2022-10-17

## 2022-10-17 VITALS
HEIGHT: 69 IN | TEMPERATURE: 97.3 F | HEART RATE: 93 BPM | BODY MASS INDEX: 35.84 KG/M2 | OXYGEN SATURATION: 96 % | DIASTOLIC BLOOD PRESSURE: 70 MMHG | SYSTOLIC BLOOD PRESSURE: 110 MMHG | WEIGHT: 242 LBS

## 2022-10-17 DIAGNOSIS — F41.8 MIXED ANXIETY DEPRESSIVE DISORDER: Primary | ICD-10-CM

## 2022-10-17 DIAGNOSIS — Z23 NEED FOR INFLUENZA VACCINATION: ICD-10-CM

## 2022-10-17 PROCEDURE — 90686 IIV4 VACC NO PRSV 0.5 ML IM: CPT | Performed by: FAMILY MEDICINE

## 2022-10-17 PROCEDURE — 90471 IMMUNIZATION ADMIN: CPT | Performed by: FAMILY MEDICINE

## 2022-10-17 PROCEDURE — 99213 OFFICE O/P EST LOW 20 MIN: CPT | Performed by: FAMILY MEDICINE

## 2022-10-17 RX ORDER — BUPROPION HYDROCHLORIDE 300 MG/1
300 TABLET ORAL DAILY
Qty: 30 TABLET | Refills: 1 | Status: SHIPPED | OUTPATIENT
Start: 2022-10-17 | End: 2022-11-21 | Stop reason: SDUPTHER

## 2022-10-17 RX ORDER — TRAZODONE HYDROCHLORIDE 50 MG/1
50 TABLET ORAL NIGHTLY
Qty: 90 TABLET | Refills: 0 | Status: SHIPPED | OUTPATIENT
Start: 2022-10-17 | End: 2022-12-19 | Stop reason: SDUPTHER

## 2022-10-17 NOTE — PROGRESS NOTES
Subjective   Marcia Shi is a 47 y.o. female.   Chief Complaint   Patient presents with   • Depression       History of Present Illness     #1 Depression with anxiety-on Celexa 40 mg a day and Wellbutrin  mg twice a day  That we started at last office visit.  Patient takes it as prescribed.  She noticed increase constipation since she started Wellbutrin.  She says there is mild improvement. No suicidal ideations or aggressive behaviors.  PHQ-9 at 7 from 9, NICKY-7 at 4 from 12.     We attempted weaning off citalopram 2 times in the last years. Patient was not able to function.    The following portions of the patient's history were reviewed and updated as appropriate: allergies, current medications, past medical history, past social history and problem list.    Review of Systems   Psychiatric/Behavioral: Negative for suicidal ideas. The patient is nervous/anxious.          Objective   Wt Readings from Last 3 Encounters:   10/17/22 110 kg (242 lb)   08/17/22 109 kg (241 lb)   06/27/22 112 kg (246 lb)      Vitals:    10/17/22 0728   BP: 110/70   Pulse: 93   Temp: 97.3 °F (36.3 °C)   SpO2: 96%     Temp Readings from Last 3 Encounters:   10/17/22 97.3 °F (36.3 °C)   08/17/22 97.5 °F (36.4 °C)   04/19/22 97.3 °F (36.3 °C)     BP Readings from Last 3 Encounters:   10/17/22 110/70   08/17/22 110/70   06/27/22 127/85     Pulse Readings from Last 3 Encounters:   10/17/22 93   08/17/22 93   06/27/22 66     Body mass index is 35.72 kg/m².    Physical Exam  Constitutional:       Appearance: She is well-developed.   HENT:      Head: Normocephalic and atraumatic.   Neck:      Thyroid: No thyromegaly.      Vascular: No carotid bruit.   Cardiovascular:      Rate and Rhythm: Normal rate and regular rhythm.      Heart sounds: Normal heart sounds.   Pulmonary:      Effort: Pulmonary effort is normal.      Breath sounds: Normal breath sounds.   Musculoskeletal:      Cervical back: Neck supple.   Skin:     General: Skin is  warm and dry.   Neurological:      Mental Status: She is alert and oriented to person, place, and time.   Psychiatric:         Behavior: Behavior normal.         Assessment & Plan   Diagnoses and all orders for this visit:    1. Mixed anxiety depressive disorder (Primary)    Other orders  -     buPROPion XL (Wellbutrin XL) 300 MG 24 hr tablet; Take 1 tablet by mouth Daily.  Dispense: 30 tablet; Refill: 1  -     traZODone (DESYREL) 50 MG tablet; Take 1 tablet by mouth Every Night.  Dispense: 90 tablet; Refill: 0        1.  Depression anxiety- subjectively minimal improvement.  Patient complains of worsening of constipation.  Will stop Wellbutrin  twice a day and start Wellbutrin  once a day.  She will use Metamucil and increase fluids to prevent constipation.  We also talked about psychotherapy, but it would be difficult for patient to find time for it.  Follow-up in 6 weeks, or sooner if problems.

## 2022-11-21 RX ORDER — NORETHINDRONE ACETATE AND ETHINYL ESTRADIOL, ETHINYL ESTRADIOL AND FERROUS FUMARATE 1MG-10(24)
1 KIT ORAL DAILY
Qty: 84 TABLET | Refills: 4 | Status: SHIPPED | OUTPATIENT
Start: 2022-11-21

## 2022-11-21 RX ORDER — BUPROPION HYDROCHLORIDE 300 MG/1
300 TABLET ORAL DAILY
Qty: 30 TABLET | Refills: 1 | Status: SHIPPED | OUTPATIENT
Start: 2022-11-21 | End: 2022-12-19 | Stop reason: SDUPTHER

## 2022-11-21 RX ORDER — CITALOPRAM 40 MG/1
40 TABLET ORAL DAILY
Qty: 90 TABLET | Refills: 1 | Status: SHIPPED | OUTPATIENT
Start: 2022-11-21

## 2022-12-19 ENCOUNTER — OFFICE VISIT (OUTPATIENT)
Dept: INTERNAL MEDICINE | Facility: CLINIC | Age: 47
End: 2022-12-19

## 2022-12-19 VITALS
HEIGHT: 69 IN | BODY MASS INDEX: 35.7 KG/M2 | TEMPERATURE: 97.8 F | HEART RATE: 87 BPM | DIASTOLIC BLOOD PRESSURE: 80 MMHG | OXYGEN SATURATION: 96 % | WEIGHT: 241 LBS | SYSTOLIC BLOOD PRESSURE: 118 MMHG

## 2022-12-19 DIAGNOSIS — F41.8 MIXED ANXIETY DEPRESSIVE DISORDER: Primary | ICD-10-CM

## 2022-12-19 DIAGNOSIS — K59.00 CONSTIPATION, UNSPECIFIED CONSTIPATION TYPE: ICD-10-CM

## 2022-12-19 PROCEDURE — 99213 OFFICE O/P EST LOW 20 MIN: CPT | Performed by: FAMILY MEDICINE

## 2022-12-19 RX ORDER — BUPROPION HYDROCHLORIDE 300 MG/1
300 TABLET ORAL DAILY
Qty: 90 TABLET | Refills: 1 | Status: SHIPPED | OUTPATIENT
Start: 2022-12-19

## 2022-12-19 RX ORDER — TRAZODONE HYDROCHLORIDE 50 MG/1
50 TABLET ORAL NIGHTLY
Qty: 90 TABLET | Refills: 1 | Status: SHIPPED | OUTPATIENT
Start: 2022-12-19

## 2022-12-19 NOTE — PROGRESS NOTES
Subjective   Marcia Shi is a 47 y.o. female.   Chief Complaint   Patient presents with   • Depression       History of Present Illness     1.Depression with anxiety-on Celexa 40 mg a day and Wellbutrin  mg a day.   We adjusted medications at last office visit.  Patient takes it as prescribed.  She reports improvement.  The depression is better.  No excessive worries.  PHQ-9 at 4 from 7 from 9, NICKY-7 at 3 from 4 from 12.  No suicidal ideations.  No aggressive behaviors.  Constipation is better, but still bothers patient.  She has hard stools.  Bowel movement every 2 to 3 days.  She takes Metamucil once a week.     We attempted weaning off citalopram 2 times in the last years. Patient was not able to function.    The following portions of the patient's history were reviewed and updated as appropriate: allergies, current medications, past family history, past medical history, past social history, past surgical history and problem list.    Review of Systems   Gastrointestinal: Positive for constipation.   Psychiatric/Behavioral: Negative for suicidal ideas. The patient is not nervous/anxious.          Objective   Wt Readings from Last 3 Encounters:   12/19/22 109 kg (241 lb)   10/17/22 110 kg (242 lb)   08/17/22 109 kg (241 lb)      Vitals:    12/19/22 1044   BP: 118/80   Pulse: 87   Temp: 97.8 °F (36.6 °C)   SpO2: 96%     Temp Readings from Last 3 Encounters:   12/19/22 97.8 °F (36.6 °C)   10/17/22 97.3 °F (36.3 °C)   08/17/22 97.5 °F (36.4 °C)     BP Readings from Last 3 Encounters:   12/19/22 118/80   10/17/22 110/70   08/17/22 110/70     Pulse Readings from Last 3 Encounters:   12/19/22 87   10/17/22 93   08/17/22 93     Body mass index is 35.57 kg/m².    Physical Exam  Constitutional:       Appearance: She is well-developed.   Neck:      Thyroid: No thyromegaly.      Vascular: No carotid bruit.   Cardiovascular:      Rate and Rhythm: Normal rate and regular rhythm.      Heart sounds: Normal heart  sounds.   Pulmonary:      Effort: Pulmonary effort is normal.      Breath sounds: Normal breath sounds.   Skin:     General: Skin is warm and dry.   Neurological:      Mental Status: She is alert and oriented to person, place, and time.   Psychiatric:         Behavior: Behavior normal.         Assessment & Plan   Diagnoses and all orders for this visit:    1. Mixed anxiety depressive disorder (Primary)    2. Constipation, unspecified constipation type    Other orders  -     buPROPion XL (Wellbutrin XL) 300 MG 24 hr tablet; Take 1 tablet by mouth Daily.  Dispense: 90 tablet; Refill: 1  -     traZODone (DESYREL) 50 MG tablet; Take 1 tablet by mouth Every Night.  Dispense: 90 tablet; Refill: 1        Depression anxiety-continue current treatment.  Follow-up in 3 months.    Constipation-use Metamucil daily.  Increase fluids.  More vegetables and fiber.  Follow-up in 3 months.

## 2022-12-22 ENCOUNTER — TELEPHONE (OUTPATIENT)
Dept: GASTROENTEROLOGY | Facility: CLINIC | Age: 47
End: 2022-12-22

## 2022-12-22 NOTE — TELEPHONE ENCOUNTER
----- Message from Marcia Shi sent at 12/22/2022 11:40 AM EST -----  Regarding: Colonoscopy prep  Contact: [phone] hi after 3 different pharmacies visits I found out that Magnesium Citrate you put on my prep has been recalled and not available.   Anything else I can use? I called your [facility]  but they told me to call you.  I left a voicemail too.    Regards,  Marcia Shi

## 2022-12-27 ENCOUNTER — HOSPITAL ENCOUNTER (OUTPATIENT)
Facility: SURGERY CENTER | Age: 47
Setting detail: HOSPITAL OUTPATIENT SURGERY
Discharge: HOME | End: 2022-12-27
Attending: INTERNAL MEDICINE | Admitting: INTERNAL MEDICINE
Payer: COMMERCIAL

## 2022-12-27 ENCOUNTER — ANESTHESIA EVENT (OUTPATIENT)
Dept: SURGERY | Facility: SURGERY CENTER | Age: 47
End: 2022-12-27
Payer: COMMERCIAL

## 2022-12-27 ENCOUNTER — ANESTHESIA (OUTPATIENT)
Dept: SURGERY | Facility: SURGERY CENTER | Age: 47
End: 2022-12-27
Payer: COMMERCIAL

## 2022-12-27 VITALS
DIASTOLIC BLOOD PRESSURE: 96 MMHG | HEIGHT: 69 IN | RESPIRATION RATE: 16 BRPM | TEMPERATURE: 98.4 F | BODY MASS INDEX: 34.98 KG/M2 | WEIGHT: 236.2 LBS | OXYGEN SATURATION: 98 % | SYSTOLIC BLOOD PRESSURE: 139 MMHG | HEART RATE: 65 BPM

## 2022-12-27 DIAGNOSIS — Z12.11 ENCOUNTER FOR SCREENING FOR MALIGNANT NEOPLASM OF COLON: ICD-10-CM

## 2022-12-27 PROCEDURE — 45380 COLONOSCOPY AND BIOPSY: CPT | Performed by: INTERNAL MEDICINE

## 2022-12-27 PROCEDURE — 81025 URINE PREGNANCY TEST: CPT | Performed by: INTERNAL MEDICINE

## 2022-12-27 PROCEDURE — 45385 COLONOSCOPY W/LESION REMOVAL: CPT | Performed by: INTERNAL MEDICINE

## 2022-12-27 PROCEDURE — 25010000002 PROPOFOL 10 MG/ML EMULSION: Performed by: NURSE ANESTHETIST, CERTIFIED REGISTERED

## 2022-12-27 PROCEDURE — 88305 TISSUE EXAM BY PATHOLOGIST: CPT | Performed by: INTERNAL MEDICINE

## 2022-12-27 RX ORDER — SODIUM CHLORIDE 0.9 % (FLUSH) 0.9 %
3 SYRINGE (ML) INJECTION EVERY 12 HOURS SCHEDULED
Status: DISCONTINUED | OUTPATIENT
Start: 2022-12-27 | End: 2022-12-27 | Stop reason: HOSPADM

## 2022-12-27 RX ORDER — MAGNESIUM HYDROXIDE 1200 MG/15ML
LIQUID ORAL AS NEEDED
Status: DISCONTINUED | OUTPATIENT
Start: 2022-12-27 | End: 2022-12-27 | Stop reason: HOSPADM

## 2022-12-27 RX ORDER — PROPOFOL 10 MG/ML
VIAL (ML) INTRAVENOUS AS NEEDED
Status: DISCONTINUED | OUTPATIENT
Start: 2022-12-27 | End: 2022-12-27 | Stop reason: SURG

## 2022-12-27 RX ORDER — SODIUM CHLORIDE 0.9 % (FLUSH) 0.9 %
10 SYRINGE (ML) INJECTION AS NEEDED
Status: DISCONTINUED | OUTPATIENT
Start: 2022-12-27 | End: 2022-12-27 | Stop reason: HOSPADM

## 2022-12-27 RX ORDER — LIDOCAINE HYDROCHLORIDE 20 MG/ML
INJECTION, SOLUTION INFILTRATION; PERINEURAL AS NEEDED
Status: DISCONTINUED | OUTPATIENT
Start: 2022-12-27 | End: 2022-12-27 | Stop reason: SURG

## 2022-12-27 RX ORDER — SODIUM CHLORIDE, SODIUM LACTATE, POTASSIUM CHLORIDE, CALCIUM CHLORIDE 600; 310; 30; 20 MG/100ML; MG/100ML; MG/100ML; MG/100ML
30 INJECTION, SOLUTION INTRAVENOUS CONTINUOUS PRN
Status: DISCONTINUED | OUTPATIENT
Start: 2022-12-27 | End: 2022-12-27 | Stop reason: HOSPADM

## 2022-12-27 RX ADMIN — SODIUM CHLORIDE, POTASSIUM CHLORIDE, SODIUM LACTATE AND CALCIUM CHLORIDE 30 ML/HR: 600; 310; 30; 20 INJECTION, SOLUTION INTRAVENOUS at 06:37

## 2022-12-27 RX ADMIN — PROPOFOL 200 MCG/KG/MIN: 10 INJECTION, EMULSION INTRAVENOUS at 07:24

## 2022-12-27 RX ADMIN — LIDOCAINE HYDROCHLORIDE 60 MG: 20 INJECTION, SOLUTION INFILTRATION; PERINEURAL at 07:24

## 2022-12-27 RX ADMIN — PROPOFOL 80 MG: 10 INJECTION, EMULSION INTRAVENOUS at 07:24

## 2022-12-27 NOTE — ANESTHESIA PREPROCEDURE EVALUATION
Anesthesia Evaluation     Patient summary reviewed and Nursing notes reviewed                Airway   Mallampati: II  Dental - normal exam     Pulmonary - normal exam   (+) a smoker Former,   Cardiovascular - negative cardio ROS and normal exam    (-) hypertension, CAD      Neuro/Psych  (+) psychiatric history Anxiety and Depression,    GI/Hepatic/Renal/Endo    (+) obesity,     (-) no renal disease, diabetes    Musculoskeletal     Abdominal   (+) obese,    Substance History      OB/GYN          Other                        Anesthesia Plan    ASA 2     MAC       Anesthetic plan, risks, benefits, and alternatives have been provided, discussed and informed consent has been obtained with: patient.        CODE STATUS:

## 2022-12-27 NOTE — ANESTHESIA POSTPROCEDURE EVALUATION
"Patient: Marcia Shi    Procedure Summary     Date: 12/27/22 Room / Location: [facility]    Anesthesia Start: 0721 Anesthesia Stop: 0750    Procedure: COLONOSCOPY WITH POLYPECTOMY Diagnosis:       Encounter for screening for malignant neoplasm of colon      (Encounter for screening for malignant neoplasm of colon [Z12.11])    Surgeons: Eleuterio Crawford MD Provider: Bhavna Og MD    Anesthesia Type: MAC ASA Status: 2          Anesthesia Type: MAC    Vitals  Vitals Value Taken Time   /96 12/27/22 0809   Temp 36.9 °C (98.4 °F) 12/27/22 0749   Pulse 65 12/27/22 0809   Resp 16 12/27/22 0809   SpO2 98 % 12/27/22 0809           Post Anesthesia Care and Evaluation    Patient location during evaluation: PHASE II  Patient participation: complete - patient participated  Level of consciousness: awake  Pain management: adequate    Airway patency: patent  Anesthetic complications: No anesthetic complications    Cardiovascular status: acceptable  Respiratory status: acceptable  Hydration status: acceptable    Comments: /96 (BP Location: Left arm, Patient Position: Sitting)   Pulse 65   Temp 36.9 °C (98.4 °F) (Tympanic)   Resp 16   Ht 175.3 cm (69\")   Wt 107 kg (236 lb 3.2 oz)   LMP 12/08/2022 (Approximate)   SpO2 98%   BMI 34.88 kg/m²       "

## 2022-12-27 NOTE — H&P
No chief complaint on file.      HPI  Patient today for a colonoscopy for screening.  She has a family history of colon cancer.         Problem List:    Patient Active Problem List   Diagnosis   • Atopic rhinitis   • Mixed anxiety depressive disorder   • Insomnia   • Adiposity   • Vitamin D deficiency   • Encounter for screening for malignant neoplasm of colon   • Family history of colonic polyps   • Family history of colon cancer       Medical History:    Past Medical History:   Diagnosis Date   • Ganglion cyst of wrist    • UTI (urinary tract infection)    • Vitamin D deficiency         Social History:    Social History     Socioeconomic History   • Marital status:    Tobacco Use   • Smoking status: Former     Packs/day: 0.50     Years: 10.00     Pack years: 5.00     Types: Cigarettes     Quit date: 1/3/2005     Years since quittin.9   • Smokeless tobacco: Never   • Tobacco comments:     quit 15 yrs ago   Vaping Use   • Vaping Use: Never used   Substance and Sexual Activity   • Alcohol use: Yes     Comment: occa   • Drug use: Never   • Sexual activity: Yes     Partners: Male     Birth control/protection: Condom       Family History:   Family History   Problem Relation Age of Onset   • Hypertension Mother    • Cervical cancer Mother 79   • Colon cancer Father 70   • Lymphoma Father    • Breast cancer Neg Hx    • Ovarian cancer Neg Hx    • Uterine cancer Neg Hx    • Deep vein thrombosis Neg Hx    • Pulmonary embolism Neg Hx        Surgical History:   Past Surgical History:   Procedure Laterality Date   •  SECTION     • COLONOSCOPY N/A 2021    Procedure: COLONOSCOPY with polypectomy;  Surgeon: Eleuterio Crawford MD;  [place] [facility] [place]  Service: Gastroenterology;  Laterality: N/A;  polyps, diverticulosis   • NASAL SEPTUM SURGERY     • TONSILLECTOMY           Current Facility-Administered Medications:   •  lactated ringers infusion, 30 mL/hr, Intravenous, Continuous PRN, Yvette  Eleuterio FLORES MD, Last Rate: 30 mL/hr at 12/27/22 0637, 30 mL/hr at 12/27/22 0637  •  sodium chloride 0.9 % flush 10 mL, 10 mL, Intravenous, PRN, Eleuterio Crawford MD  •  sodium chloride 0.9 % flush 3 mL, 3 mL, Intravenous, Q12H, Eleuterio Crawford MD    Allergies: No Known Allergies     The following portions of the patient's history were reviewed by me and updated as appropriate: review of systems, allergies, current medications, past family history, past medical history, past social history, past surgical history and problem list.    Vitals:    12/27/22 0625   BP: 144/98   Pulse: 94   Resp: 16   Temp: 98 °F (36.7 °C)   SpO2: 96%       PHYSICAL EXAM:    CONSTITUTIONAL:  today's vital signs reviewed by me  GASTROINTESTINAL: abdomen is soft nontender nondistended with normal active bowel sounds, no masses are appreciated    Assessment/ Plan  We will proceed today with screening colonoscopy.    Risks and benefits as well as alternatives to endoscopic evaluation were explained to the patient and they voiced understanding and wish to proceed.  These risks include but are not limited to the risk of bleeding, perforation, adverse reaction to sedation, and missed lesions.  The patient was given the opportunity to ask questions prior to the endoscopic procedure.

## 2022-12-27 NOTE — DISCHARGE INSTRUCTIONS
ENDOSCOPY - EGD/COLONOSCOPY       ADULT CARE DISCHARGE  INSTRUCTIONS     Symptoms you may temporarily experience:      Sore Throat     Hoarseness     Bloating/Cramping     Dizziness     IV Irritation/tenderness     Gas or Belching     Slight fever     Small amount of blood in vomit or stool       Call Your Doctor for the following Problems: ______________________     ________________     Fever of 101 degrees or higher       Sharp abdominal  pain     Red streak up the arm from the IV site     Severe cramping        Large amount of blood in stool or vomit       Instructions for the next 24 hours after your Procedure:     Adult supervision     Do NOT drink any alcohol      Do not work today     NO important decisions     DO NOT sign any legal documents     You may shower/ bathe       DO NOT  DRIVE or operate machinery     Resume normal activity tomorrow       Discharge  Diet:     Avoid spicy/ greasy foods     Avoid any food that will cause more gas or bloating       *** Seek IMMEDIATE medical attention and call 911 if you develop symptoms such as:     Chest pain     Shortness of breath     Severe bleeding

## 2022-12-28 LAB
LAB AP CASE REPORT: NORMAL
LAB AP CLINICAL INFORMATION: NORMAL
PATH REPORT.FINAL DX SPEC: NORMAL
PATH REPORT.GROSS SPEC: NORMAL

## 2023-01-15 ENCOUNTER — TELEMEDICINE (OUTPATIENT)
Dept: FAMILY MEDICINE CLINIC | Facility: TELEHEALTH | Age: 48
End: 2023-01-15
Payer: COMMERCIAL

## 2023-01-15 VITALS — WEIGHT: 236 LBS | BODY MASS INDEX: 34.96 KG/M2 | TEMPERATURE: 98.7 F | HEIGHT: 69 IN

## 2023-01-15 DIAGNOSIS — J01.40 ACUTE PANSINUSITIS, RECURRENCE NOT SPECIFIED: Primary | ICD-10-CM

## 2023-01-15 PROCEDURE — 99213 OFFICE O/P EST LOW 20 MIN: CPT | Performed by: NURSE PRACTITIONER

## 2023-01-15 RX ORDER — AMOXICILLIN 875 MG/1
875 TABLET, COATED ORAL 2 TIMES DAILY
Qty: 20 TABLET | Refills: 0 | Status: SHIPPED | OUTPATIENT
Start: 2023-01-15 | End: 2023-01-25

## 2023-01-15 RX ORDER — PREDNISONE 20 MG/1
20 TABLET ORAL 2 TIMES DAILY
Qty: 10 TABLET | Refills: 0 | Status: SHIPPED | OUTPATIENT
Start: 2023-01-15

## 2023-01-15 NOTE — PATIENT INSTRUCTIONS
Sinusitis, Adult  Sinusitis is inflammation of your sinuses. Sinuses are hollow spaces in the bones around your face. Your sinuses are located:  Around your eyes.  In the middle of your forehead.  Behind your nose.  In your cheekbones.  Mucus normally drains out of your sinuses. When your nasal tissues become inflamed or swollen, mucus can become trapped or blocked. This allows bacteria, viruses, and fungi to grow, which leads to infection. Most infections of the sinuses are caused by a virus.  Sinusitis can develop quickly. It can last for up to 4 weeks (acute) or for more than 12 weeks (chronic). Sinusitis often develops after a cold.  What are the causes?  This condition is caused by anything that creates swelling in the sinuses or stops mucus from draining. This includes:  Allergies.  Asthma.  Infection from bacteria or viruses.  Deformities or blockages in your nose or sinuses.  Abnormal growths in the nose (nasal polyps).  Pollutants, such as chemicals or irritants in the air.  Infection from fungi (rare).  What increases the risk?  You are more likely to develop this condition if you:  Have a weak body defense system (immune system).  Do a lot of swimming or diving.  Overuse nasal sprays.  Smoke.  What are the signs or symptoms?  The main symptoms of this condition are pain and a feeling of pressure around the affected sinuses. Other symptoms include:  Stuffy nose or congestion.  Thick drainage from your nose.  Swelling and warmth over the affected sinuses.  Headache.  Upper toothache.  A cough that may get worse at night.  Extra mucus that collects in the throat or the back of the nose (postnasal drip).  Decreased sense of smell and taste.  Fatigue.  A fever.  Sore throat.  Bad breath.  How is this diagnosed?  This condition is diagnosed based on:  Your symptoms.  Your medical history.  A physical exam.  Tests to find out if your condition is acute or chronic. This may include:  Checking your nose for nasal  polyps.  Viewing your sinuses using a device that has a light (endoscope).  Testing for allergies or bacteria.  Imaging tests, such as an MRI or CT scan.  In rare cases, a bone biopsy may be done to rule out more serious types of fungal sinus disease.  How is this treated?  Treatment for sinusitis depends on the cause and whether your condition is chronic or acute.  If caused by a virus, your symptoms should go away on their own within 10 days. You may be given medicines to relieve symptoms. They include:  Medicines that shrink swollen nasal passages (topical intranasal decongestants).  Medicines that treat allergies (antihistamines).  A spray that eases inflammation of the nostrils (topical intranasal corticosteroids).  Rinses that help get rid of thick mucus in your nose (nasal saline washes).  If caused by bacteria, your health care provider may recommend waiting to see if your symptoms improve. Most bacterial infections will get better without antibiotic medicine. You may be given antibiotics if you have:  A severe infection.  A weak immune system.  If caused by narrow nasal passages or nasal polyps, you may need to have surgery.  Follow these instructions at home:  Medicines  Take, use, or apply over-the-counter and prescription medicines only as told by your health care provider. These may include nasal sprays.  If you were prescribed an antibiotic medicine, take it as told by your health care provider. Do not stop taking the antibiotic even if you start to feel better.  Hydrate and humidify    Drink enough fluid to keep your urine pale yellow. Staying hydrated will help to thin your mucus.  Use a cool mist humidifier to keep the humidity level in your home above 50%.  Inhale steam for 10-15 minutes, 3-4 times a day, or as told by your health care provider. You can do this in the bathroom while a hot shower is running.  Limit your exposure to cool or dry air.  Rest  Rest as much as possible.  Sleep with your  head raised (elevated).  Make sure you get enough sleep each night.  General instructions    Apply a warm, moist washcloth to your face 3-4 times a day or as told by your health care provider. This will help with discomfort.  Wash your hands often with soap and water to reduce your exposure to germs. If soap and water are not available, use hand .  Do not smoke. Avoid being around people who are smoking (secondhand smoke).  Keep all follow-up visits as told by your health care provider. This is important.  Contact a health care provider if:  You have a fever.  Your symptoms get worse.  Your symptoms do not improve within 10 days.  Get help right away if:  You have a severe headache.  You have persistent vomiting.  You have severe pain or swelling around your face or eyes.  You have vision problems.  You develop confusion.  Your neck is stiff.  You have trouble breathing.  Summary  Sinusitis is soreness and inflammation of your sinuses. Sinuses are hollow spaces in the bones around your face.  This condition is caused by nasal tissues that become inflamed or swollen. The swelling traps or blocks the flow of mucus. This allows bacteria, viruses, and fungi to grow, which leads to infection.  If you were prescribed an antibiotic medicine, take it as told by your health care provider. Do not stop taking the antibiotic even if you start to feel better.  Keep all follow-up visits as told by your health care provider. This is important.  This information is not intended to replace advice given to you by your health care provider. Make sure you discuss any questions you have with your health care provider.  Document Revised: 05/20/2019 Document Reviewed: 05/20/2019  [employer] Patient Education © 2022 [employer]

## 2023-01-15 NOTE — PROGRESS NOTES
You have chosen to receive care through a telehealth visit.  Do you consent to use a video/audio connection for your medical care today? Yes     CHIEF COMPLAINT  Cc: sinus problem    HPI  Marcia Shi is a 47 y.o. female  presents with complaint of sinus problem. She reports sinus congestion and pain, headache. She also reports that she has done a COVID test and the results were negative. She reports yellow nasal congestion and sinus pain and pressure. Additional symptoms are noted in the ROS portio of this visit. Her symptoms began 5 days ago. She reports feeling like she needs an antibiotic. She hs tried taking Mucinex for her symptoms which helped at first but now her symptoms are worsening.    .Review of Systems   Constitutional: Negative for fatigue and fever.   HENT: Positive for congestion (yellow), sinus pressure, sinus pain and sore throat (just started today). Negative for postnasal drip, rhinorrhea and sneezing.    Respiratory: Negative for cough, chest tightness and shortness of breath.    Gastrointestinal: Negative for diarrhea and nausea.   Musculoskeletal: Negative for myalgias.   Neurological: Positive for headaches.       Past Medical History:   Diagnosis Date   • Ganglion cyst of wrist    • UTI (urinary tract infection)    • Vitamin D deficiency        Family History   Problem Relation Age of Onset   • Hypertension Mother    • Cervical cancer Mother 79   • Colon cancer Father 70   • Lymphoma Father    • Breast cancer Neg Hx    • Ovarian cancer Neg Hx    • Uterine cancer Neg Hx    • Deep vein thrombosis Neg Hx    • Pulmonary embolism Neg Hx        Social History     Socioeconomic History   • Marital status:    Tobacco Use   • Smoking status: Former     Packs/day: 0.50     Years: 10.00     Pack years: 5.00     Types: Cigarettes     Quit date: 1/3/2005     Years since quittin.0   • Smokeless tobacco: Never   • Tobacco comments:     quit 15 yrs ago   Vaping Use   • Vaping Use: Never  "used   Substance and Sexual Activity   • Alcohol use: Yes     Comment: occa   • Drug use: Never   • Sexual activity: Yes     Partners: Male     Birth control/protection: Condom       Marcia Shi  reports that she quit smoking about 18 years ago. Her smoking use included cigarettes. She has a 5.00 pack-year smoking history. She has never used smokeless tobacco..    Temp 98.7 °F (37.1 °C)   Ht 175.3 cm (69\")   Wt 107 kg (236 lb)   Breastfeeding No   BMI 34.85 kg/m²     PHYSICAL EXAM  Physical Exam   Constitutional: She is oriented to person, place, and time. She appears well-developed and well-nourished.   HENT:   Head: Normocephalic and atraumatic.   Right Ear: External ear normal.   Left Ear: External ear normal.   Nose: Nose normal.   Eyes: Lids are normal. Right eye exhibits no discharge and no exudate. Left eye exhibits no discharge and no exudate. Right conjunctiva is not injected. Left conjunctiva is not injected.   Pulmonary/Chest: No accessory muscle usage. No tachypnea and no bradypnea.  No respiratory distress.No use of oxygen by nasal cannulaNo use of oxygen by mask noted.  Abdominal: Abdomen appears normal.   Neurological: She is alert and oriented to person, place, and time. No cranial nerve deficit.   Skin: Her skin appears normal.  Psychiatric: She has a normal mood and affect. Her speech is normal and behavior is normal. Judgment and thought content normal.       Results for orders placed or performed during the hospital encounter of 12/27/22   POC Pregnancy, Urine    Specimen: Urine   Result Value Ref Range    HCG, Urine, QL Negative Negative    Lot Number [id number]     Internal Positive Control Positive Positive, Passed    Internal Negative Control Negative Negative, Passed    Expiration Date 2024-01-31    Tissue Pathology Exam    Specimen: A: Large Intestine, Rectum; Polyp    B: Large Intestine, Transverse Colon; Polyp    C: Large Intestine, Right / Ascending Colon; Polyp   Result Value " Ref Range    Case Report       Surgical Pathology Report                         Case: [id number]                                  Authorizing Provider:  Eleuterio Crawford MD    Collected:           12/27/2022 07:26 AM          Ordering Location:     [facility]     Received:            12/27/2022 09:36 AM                                 [facility] MAIN OR                                                     Pathologist:           Bonnie Cordoba MD                                                          Specimens:   1) - Large Intestine, Rectum, RECTAL POLYPS X2                                                      2) - Large Intestine, Transverse Colon, TRANSVERSE COLON POLYP                                      3) - Large Intestine, Right / Ascending Colon, ASCENDING COLON POLYP                       Clinical Information       RECTAL POLYPS X2      Final Diagnosis       1. Colon, Rectum, Biopsy:     A. Fragments of hyperplastic polyp.    2. Colon, Transverse, Biopsy:     A.  Fragments of sessile serrated lesion.    3. Colon, Ascending, Biopsy:     A.  Fragments of sessile serrated lesion.    [facility]/s       Gross Description       1. Large Intestine, Rectum.  The specimen is received in formalin labeled with the patient's name and further designated 'rectal polyps biopsy' are multiple small fragments of gray-tan tissue. The specimen is submitted for embedding as received.      2. Large Intestine, Transverse Colon.  The specimen is received in formalin labeled with the patient's name and further designated 'transverse colon polyp biopsy' are multiple small fragments of gray-tan tissue. The specimen is submitted for embedding as received.      3. Large Intestine, Right / Ascending Colon.  The specimen is received in formalin labeled with the patient's name and further designated 'ascending colon polyp biopsy' are multiple small fragments of gray-tan tissue. The specimen is submitted for embedding  as received.             Diagnoses and all orders for this visit:    1. Acute pansinusitis, recurrence not specified (Primary)    Other orders  -     amoxicillin (AMOXIL) 875 MG tablet; Take 1 tablet by mouth 2 (Two) Times a Day for 10 days.  Dispense: 20 tablet; Refill: 0  -     predniSONE (DELTASONE) 20 MG tablet; Take 1 tablet by mouth 2 (Two) Times a Day.  Dispense: 10 tablet; Refill: 0    You have been prescribed a wait and seen antibiotic. Sinusitis requiring antibiotic treatment is diagnosed as symptoms of 7 to 10 days, purulent drainage, fever.  First try the  prednisone(take with food) and the mucinex)with a lot of water so secretions are nice and thin) for a few days prior to taking the prescribed antibiotic. If symptoms worsen or persist beyond 7 days you may take the prescribed antibiotic as directed.    Probiotics for two weeks related to taking antibiotics. The pharmacist can help you with this if needed. Do not take within two hours of antibiotic.  Take prednisone with food as early in the day as possible  Do not take prednisone with nsaids such as ibuprofen, aleve, or aspirin  May take tylenol for pain or fever  Continue Mucinex with plenty of fluids especially water to thin secretions and help with congestion.    FOLLOW-UP  If symptoms worsen or persist follow up with PCP, Virtual Care or Urgent Care    Patient verbalizes understanding of medication dosage, comfort measures, instructions for treatment and follow-up.    Natalia Hook, APRN  01/15/2023  10:25 EST    The use of a video visit has been reviewed with the patient and verbal informed consent has been obtained. Myself and Marcia Shi participated in this visit. The patient is located in [address] [place] [address].    I am located in [place], [place]. [employer] and [employer] were utilized. I spent 25 minutes in the patient's chart for this visit.

## 2023-04-19 DIAGNOSIS — Z00.00 ANNUAL PHYSICAL EXAM: ICD-10-CM

## 2023-04-19 DIAGNOSIS — E55.9 VITAMIN D DEFICIENCY: Primary | ICD-10-CM

## 2023-04-20 LAB
25(OH)D3+25(OH)D2 SERPL-MCNC: 42.1 NG/ML (ref 30–100)
ALBUMIN SERPL-MCNC: 4.4 G/DL (ref 3.5–5.2)
ALBUMIN/GLOB SERPL: 1.8 G/DL
ALP SERPL-CCNC: 46 U/L (ref 39–117)
ALT SERPL-CCNC: 19 U/L (ref 1–33)
AST SERPL-CCNC: 17 U/L (ref 1–32)
BILIRUB SERPL-MCNC: 0.5 MG/DL (ref 0–1.2)
BUN SERPL-MCNC: 14 MG/DL (ref 6–20)
BUN/CREAT SERPL: 13.1 (ref 7–25)
CALCIUM SERPL-MCNC: 9.5 MG/DL (ref 8.6–10.5)
CHLORIDE SERPL-SCNC: 103 MMOL/L (ref 98–107)
CHOLEST SERPL-MCNC: 238 MG/DL (ref 0–200)
CO2 SERPL-SCNC: 24.3 MMOL/L (ref 22–29)
CREAT SERPL-MCNC: 1.07 MG/DL (ref 0.57–1)
EGFRCR SERPLBLD CKD-EPI 2021: 63.8 ML/MIN/1.73
ERYTHROCYTE [DISTWIDTH] IN BLOOD BY AUTOMATED COUNT: 13.1 % (ref 12.3–15.4)
GLOBULIN SER CALC-MCNC: 2.5 GM/DL
GLUCOSE SERPL-MCNC: 101 MG/DL (ref 65–99)
HCT VFR BLD AUTO: 40.1 % (ref 34–46.6)
HDLC SERPL-MCNC: 55 MG/DL (ref 40–60)
HGB BLD-MCNC: 13.3 G/DL (ref 12–15.9)
LDLC SERPL CALC-MCNC: 157 MG/DL (ref 0–100)
LDLC/HDLC SERPL: 2.81 {RATIO}
MCH RBC QN AUTO: 30 PG (ref 26.6–33)
MCHC RBC AUTO-ENTMCNC: 33.2 G/DL (ref 31.5–35.7)
MCV RBC AUTO: 90.5 FL (ref 79–97)
PLATELET # BLD AUTO: 360 10*3/MM3 (ref 140–450)
POTASSIUM SERPL-SCNC: 4.4 MMOL/L (ref 3.5–5.2)
PROT SERPL-MCNC: 6.9 G/DL (ref 6–8.5)
RBC # BLD AUTO: 4.43 10*6/MM3 (ref 3.77–5.28)
SODIUM SERPL-SCNC: 139 MMOL/L (ref 136–145)
TRIGL SERPL-MCNC: 143 MG/DL (ref 0–150)
TSH SERPL DL<=0.005 MIU/L-ACNC: 2.65 UIU/ML (ref 0.45–4.5)
VLDLC SERPL CALC-MCNC: 26 MG/DL (ref 5–40)
WBC # BLD AUTO: 6.18 10*3/MM3 (ref 3.4–10.8)

## 2023-04-24 ENCOUNTER — OFFICE VISIT (OUTPATIENT)
Dept: INTERNAL MEDICINE | Facility: CLINIC | Age: 48
End: 2023-04-24
Payer: COMMERCIAL

## 2023-04-24 VITALS
TEMPERATURE: 98.4 F | SYSTOLIC BLOOD PRESSURE: 122 MMHG | HEIGHT: 69 IN | OXYGEN SATURATION: 95 % | HEART RATE: 88 BPM | BODY MASS INDEX: 35.7 KG/M2 | DIASTOLIC BLOOD PRESSURE: 80 MMHG | WEIGHT: 241 LBS

## 2023-04-24 DIAGNOSIS — F41.8 MIXED ANXIETY DEPRESSIVE DISORDER: ICD-10-CM

## 2023-04-24 DIAGNOSIS — G47.09 OTHER INSOMNIA: ICD-10-CM

## 2023-04-24 DIAGNOSIS — Z00.00 PHYSICAL EXAM, ANNUAL: Primary | ICD-10-CM

## 2023-04-24 PROCEDURE — 99396 PREV VISIT EST AGE 40-64: CPT | Performed by: FAMILY MEDICINE

## 2023-04-24 RX ORDER — TRAZODONE HYDROCHLORIDE 50 MG/1
50 TABLET ORAL NIGHTLY
Qty: 90 TABLET | Refills: 1 | Status: SHIPPED | OUTPATIENT
Start: 2023-04-24

## 2023-04-24 RX ORDER — CITALOPRAM 40 MG/1
40 TABLET ORAL DAILY
Qty: 90 TABLET | Refills: 1 | Status: SHIPPED | OUTPATIENT
Start: 2023-04-24

## 2023-04-24 RX ORDER — BUPROPION HYDROCHLORIDE 300 MG/1
300 TABLET ORAL DAILY
Qty: 90 TABLET | Refills: 1 | Status: SHIPPED | OUTPATIENT
Start: 2023-04-24

## 2023-04-24 NOTE — PROGRESS NOTES
Subjective   Marcia Shi is a 48 y.o. female.   Chief Complaint   Patient presents with   • Depression   • Annual Exam       History of Present Illness     1. CPE-patient is here today for a complete physical exam.  She has no complaints.    There is no change in medical, surgical or family history from last office visit.  No change in prescription medications.  Over-the-counter- she takes multivitamin, vitamin D, melatonin, cranberry extract and Zyrtec in spring.  She is not allergic to any medications.  She does not use tobacco products, no alcohol, no drugs.  She exercises every day.  She walks her dog for about 30 minutes.  Dental appointments every 6 months, yearly eye exams.  She saw her GYN in summer 2022.  She is up-to-date with Pap smear and mammogram.  She had colonoscopy in December 2022.  Polyps.  Next was recommended 3 years later.  She had COVID-vaccine x3, flu vaccine this season, tetanus vaccine in November 2020.    Depression with anxiety-on Celexa 40 mg a day and Wellbutrin  mg a day.  She takes it every day.  She has no side effects.  Mood is normal.  No excessive worries.  Some irritability, but she is able to control it.  PHQ-9 at 4, NICKY-7 at 3. No suicidal ideations.  No aggressive behaviors.  We attempted weaning off citalopram 2 times in the last years. Patient was not able to function.    Insomnia-she takes trazodone 50 mg at bedtime and melatonin.  She gets 8 hours of sleep.  No side effects.     from 134, HDL 55.  .    The following portions of the patient's history were reviewed and updated as appropriate: allergies, current medications, past family history, past medical history, past social history, past surgical history and problem list.    Review of Systems   Constitutional: Negative.    Respiratory: Negative.    Cardiovascular: Negative.    Gastrointestinal: Negative for blood in stool.   Genitourinary: Negative for hematuria.   Psychiatric/Behavioral:  Negative.  Negative for suicidal ideas.         Objective   Wt Readings from Last 3 Encounters:   04/24/23 109 kg (241 lb)   01/15/23 107 kg (236 lb)   12/27/22 107 kg (236 lb 3.2 oz)      Vitals:    04/24/23 1331   BP: 122/80   Pulse: 88   Temp: 98.4 °F (36.9 °C)   SpO2: 95%     Temp Readings from Last 3 Encounters:   04/24/23 98.4 °F (36.9 °C)   01/15/23 98.7 °F (37.1 °C)   12/27/22 98.4 °F (36.9 °C) (Tympanic)     BP Readings from Last 3 Encounters:   04/24/23 122/80   12/27/22 139/96   12/19/22 118/80     Pulse Readings from Last 3 Encounters:   04/24/23 88   12/27/22 65   12/19/22 87     Body mass index is 35.57 kg/m².    Physical Exam  Vitals reviewed.   Constitutional:       General: She is not in acute distress.     Appearance: She is well-developed. She is not diaphoretic.   HENT:      Head: Normocephalic and atraumatic. Hair is normal.      Right Ear: Hearing, tympanic membrane, ear canal and external ear normal. No decreased hearing noted. No drainage.      Left Ear: Hearing, tympanic membrane, ear canal and external ear normal. No decreased hearing noted.      Nose: No nasal deformity.   Eyes:      General: Lids are normal.         Right eye: No discharge.         Left eye: No discharge.      Conjunctiva/sclera: Conjunctivae normal.      Pupils: Pupils are equal, round, and reactive to light.   Neck:      Thyroid: No thyromegaly.      Vascular: No JVD.      Trachea: No tracheal deviation.   Cardiovascular:      Rate and Rhythm: Normal rate and regular rhythm.      Pulses: Normal pulses.      Heart sounds: Normal heart sounds. No murmur heard.    No friction rub. No gallop.   Pulmonary:      Effort: Pulmonary effort is normal. No respiratory distress.      Breath sounds: Normal breath sounds. No wheezing or rales.   Chest:      Chest wall: No tenderness.   Abdominal:      General: There is no distension.      Palpations: Abdomen is soft. There is no mass.      Tenderness: There is no abdominal tenderness.  There is no guarding or rebound.      Hernia: No hernia is present.   Musculoskeletal:         General: No tenderness or deformity. Normal range of motion.      Cervical back: Normal range of motion and neck supple.   Lymphadenopathy:      Cervical: No cervical adenopathy.      Upper Body:      Right upper body: No supraclavicular adenopathy.      Left upper body: No supraclavicular adenopathy.   Skin:     General: Skin is warm and dry.      Findings: No erythema or rash.   Neurological:      Mental Status: She is alert and oriented to person, place, and time.      Cranial Nerves: No cranial nerve deficit.      Motor: No abnormal muscle tone.      Coordination: Coordination normal.      Deep Tendon Reflexes: Reflexes are normal and symmetric. Reflexes normal.   Psychiatric:         Behavior: Behavior normal.         Thought Content: Thought content normal.         Judgment: Judgment normal.         Assessment & Plan   Diagnoses and all orders for this visit:    1. Physical exam, annual (Primary)    2. Mixed anxiety depressive disorder    3. Other insomnia    Other orders  -     buPROPion XL (Wellbutrin XL) 300 MG 24 hr tablet; Take 1 tablet by mouth Daily.  Dispense: 90 tablet; Refill: 1  -     citalopram (CeleXA) 40 MG tablet; Take 1 tablet by mouth Daily.  Dispense: 90 tablet; Refill: 1  -     traZODone (DESYREL) 50 MG tablet; Take 1 tablet by mouth Every Night.  Dispense: 90 tablet; Refill: 1         1.  CPE-preventive counseling provided.  Blood work results reviewed with patient.  Obesity increases risk for developing diabetes and heart disease.  Information on counting calories to lose weight and exercise to lose weight provided.  She is up-to-date with cancer screening.  She is up-to-date with vaccinations.  Continue regular dental appointments at least every 6 months.  Sun protection is recommended.    2.  Depression anxiety-continue current treatment.  Follow-up in 6 months.  3.  Insomnia-continue current  treatment.  Follow-up in 6 months.  4.   Hperlipidemia- no statins indicated with a 10y ASCVD risk score at 1.3%.  Information on dietary changes to improve cholesterol provided.      The 10-year ASCVD risk score (Rae GAMA, et al., 2019) is: 1.3%    Values used to calculate the score:      Age: 48 years      Sex: Female      Is Non- : No      Diabetic: No      Tobacco smoker: No      Systolic Blood Pressure: 122 mmHg      Is BP treated: No      HDL Cholesterol: 55 mg/dL      Total Cholesterol: 238 mg/dL          Class 2 Severe Obesity (BMI >=35 and <=39.9). Obesity-related health conditions include the following: dyslipidemias. Obesity is unchanged. BMI is is above average; BMI management plan is completed. We discussed portion control and increasing exercise.

## 2023-05-17 ENCOUNTER — TELEPHONE (OUTPATIENT)
Dept: OBSTETRICS AND GYNECOLOGY | Facility: CLINIC | Age: 48
End: 2023-05-17
Payer: COMMERCIAL

## 2023-05-17 NOTE — TELEPHONE ENCOUNTER
"----- Message from Joseline Morocho RN sent at 5/17/2023  8:56 AM EDT -----  Regarding: RE: Lo loestrin no longer covered by insurance  Contact: [phone]  My Chart. Mason pt. AE 6/27/22. Insurance no longer covers Loestrin. Can you prescribe something similar/compatible? [facility] pharmacy. Thank you.    ----- Message -----  From: Marcia Shi"Tiki\"  Sent: 5/16/2023   5:09 PM EDT  To: [facility]  Subject: Lo loestrin no longer covered by insurance       Doctor,  My insurance stopped covering Loestrin. Can you prescribe something similar/compatible, please?  I have 8 days left of tablets.    Regards,    Tiki  "

## 2023-05-18 RX ORDER — NORETHINDRONE ACETATE AND ETHINYL ESTRADIOL 1MG-20(21)
1 KIT ORAL DAILY
Qty: 28 TABLET | Refills: 1 | Status: SHIPPED | OUTPATIENT
Start: 2023-05-18 | End: 2024-05-17

## 2023-05-18 NOTE — TELEPHONE ENCOUNTER
My Chart. I am working on getting her scheduled for AE. Can you prescribe an OCP like Loestrin, which is not longer covered by her insurance. [facility] pharmacy. Thank you.

## 2023-08-28 ENCOUNTER — OFFICE VISIT (OUTPATIENT)
Dept: OBSTETRICS AND GYNECOLOGY | Facility: CLINIC | Age: 48
End: 2023-08-28
Payer: COMMERCIAL

## 2023-08-28 VITALS
BODY MASS INDEX: 35.43 KG/M2 | DIASTOLIC BLOOD PRESSURE: 89 MMHG | HEIGHT: 69 IN | SYSTOLIC BLOOD PRESSURE: 134 MMHG | WEIGHT: 239.2 LBS

## 2023-08-28 DIAGNOSIS — Z01.419 WOMEN'S ANNUAL ROUTINE GYNECOLOGICAL EXAMINATION: Primary | ICD-10-CM

## 2023-08-28 DIAGNOSIS — Z30.41 ENCOUNTER FOR SURVEILLANCE OF CONTRACEPTIVE PILLS: ICD-10-CM

## 2023-08-28 RX ORDER — LEVONORGESTREL AND ETHINYL ESTRADIOL 0.1-0.02MG
1 KIT ORAL DAILY
Qty: 84 TABLET | Refills: 3 | Status: SHIPPED | OUTPATIENT
Start: 2023-08-28

## 2023-08-28 NOTE — PROGRESS NOTES
GYN Annual Exam     Chief Complaint   Patient presents with    Gynecologic Exam     Patient is here today for her annual exam. Last AE and pap smear was  NIL. Last mammogram was .        Marcia Shi is a 48 y.o. female who presents for annual well woman exam. She is having regular menses. LMP 2023. She denies vaginal spotting or discharge. She completes SBE monthly. Using MEGAN for menorrhagia. States Junel is no longer covered on her insurance, she has been without this for one month. Denies history of migraine with aura, denies history of DVT, there is no family history of DVT. She is a nonsmoker.    This is my first time meeting Marcia Shi  She is a patient of Dr. Mason's.     OB History          1    Para   1    Term   1            AB        Living   1         SAB        IAB        Ectopic        Molar        Multiple        Live Births   1                Mammogram: 2023 normal  Dexa scan: low FRAX  Colonoscopy:   Last Pap : 2022 NIL, HPV negative  History of abnormal Pap smear: no  Family history of uterine, colon or ovarian cancer: yes - father, colon cancer  Family history of breast cancer: no  History of abnormal mammogram: prior benign breast biopsy     Past Medical History:   Diagnosis Date    Ganglion cyst of wrist     UTI (urinary tract infection)     Vitamin D deficiency        Past Surgical History:   Procedure Laterality Date     SECTION      COLONOSCOPY N/A 2021    Procedure: COLONOSCOPY with polypectomy;  Surgeon: Eleuterio Crawford MD;  [place] [facility] [place]  Service: Gastroenterology;  Laterality: N/A;  polyps, diverticulosis    COLONOSCOPY W/ POLYPECTOMY N/A 2022    Procedure: COLONOSCOPY WITH POLYPECTOMY;  Surgeon: Eleuterio Crawford MD;  [place] [facility];  Service: Gastroenterology;  Laterality: N/A;  POLYPS X4    NASAL SEPTUM SURGERY      TONSILLECTOMY           Current Outpatient Medications:     buPROPion XL  (Wellbutrin XL) 300 MG 24 hr tablet, Take 1 tablet by mouth Daily., Disp: 90 tablet, Rfl: 1    cetirizine (ZyrTEC) 10 MG tablet, Take 1 tablet by mouth Daily., Disp: , Rfl:     cholecalciferol (VITAMIN D3) 1000 UNITS tablet, Take by mouth., Disp: , Rfl:     citalopram (CeleXA) 40 MG tablet, Take 1 tablet by mouth Daily., Disp: 90 tablet, Rfl: 1    CRANBERRY PO, Take by mouth., Disp: , Rfl:     fluticasone (FLONASE) 50 MCG/ACT nasal spray, 2 sprays into the nostril(s) as directed by provider Daily., Disp: 16 g, Rfl: 5    Melatonin 5 MG tablet dispersible, Take 2 tablets by mouth., Disp: , Rfl:     Multiple Vitamin (MULTIVITAMIN) capsule, Take by mouth., Disp: , Rfl:     traZODone (DESYREL) 50 MG tablet, Take 1 tablet by mouth Every Night., Disp: 90 tablet, Rfl: 1    predniSONE (DELTASONE) 20 MG tablet, Take 1 tablet by mouth 2 (Two) Times a Day. (Patient not taking: Reported on 2023), Disp: 10 tablet, Rfl: 0    Tyblume 0.1-20 MG-MCG chewable tablet, Chew 1 tablet Daily., Disp: 84 tablet, Rfl: 3    No Known Allergies    Social History     Tobacco Use    Smoking status: Former     Packs/day: 0.50     Years: 10.00     Pack years: 5.00     Types: Cigarettes     Quit date: 1/3/2005     Years since quittin.6    Smokeless tobacco: Never    Tobacco comments:     quit 15 yrs ago   Vaping Use    Vaping Use: Never used   Substance Use Topics    Alcohol use: Yes     Comment: occa    Drug use: Never       Family History   Problem Relation Age of Onset    Hypertension Mother     Cervical cancer Mother 79    Colon cancer Father 70    Lymphoma Father     Breast cancer Neg Hx     Ovarian cancer Neg Hx     Uterine cancer Neg Hx     Deep vein thrombosis Neg Hx     Pulmonary embolism Neg Hx        Review of Systems   Constitutional:  Negative for chills, fatigue and fever.   Gastrointestinal:  Negative for abdominal distention, abdominal pain, nausea and vomiting.   Endocrine: Negative for cold intolerance and heat  "intolerance.   Genitourinary:  Negative for dyspareunia, dysuria, menstrual problem, pelvic pain, vaginal bleeding, vaginal discharge and vaginal pain.   Musculoskeletal:  Negative for gait problem.   Skin:  Negative for rash.   Neurological:  Negative for dizziness and headaches.   Hematological:  Does not bruise/bleed easily.   Psychiatric/Behavioral:  Negative for behavioral problems.      /89   Ht 175.3 cm (69.02\")   Wt 109 kg (239 lb 3.2 oz)   LMP 08/16/2023   BMI 35.31 kg/mý     Physical Exam  Constitutional:       General: She is not in acute distress.     Appearance: Normal appearance. She is not ill-appearing, toxic-appearing or diaphoretic.   Genitourinary:      Vulva, bladder and urethral meatus normal.      No lesions in the vagina.      Right Labia: No rash, tenderness, lesions, skin changes or Bartholin's cyst.     Left Labia: No tenderness, lesions, skin changes, Bartholin's cyst or rash.     No labial fusion noted.      No inguinal adenopathy present in the right or left side.     No vaginal discharge, erythema, tenderness, bleeding or ulceration.      No vaginal prolapse present.     No vaginal atrophy present.       Right Adnexa: not tender, not full, not palpable, no mass present and not absent.     Left Adnexa: not tender, not full, not palpable, no mass present and not absent.     No cervical motion tenderness, discharge, friability, lesion, polyp, nabothian cyst or eversion.      Uterus is not enlarged, fixed, tender, irregular or prolapsed.      No uterine mass detected.     No urethral tenderness or mass present.      Pelvic exam was performed with patient in the lithotomy position.   Breasts:     Breasts are symmetrical.      Right: Present. No swelling, bleeding, inverted nipple, mass, nipple discharge, skin change, tenderness or breast implant.      Left: Present. No swelling, bleeding, inverted nipple, mass, nipple discharge, skin change, tenderness or breast implant.   HENT:    "   Head: Normocephalic and atraumatic.   Eyes:      Pupils: Pupils are equal, round, and reactive to light.   Cardiovascular:      Rate and Rhythm: Normal rate.   Pulmonary:      Effort: Pulmonary effort is normal.   Abdominal:      General: There is no distension.      Palpations: Abdomen is soft. There is no mass.      Tenderness: There is no abdominal tenderness. There is no guarding.      Hernia: No hernia is present. There is no hernia in the left inguinal area or right inguinal area.   Musculoskeletal:         General: Normal range of motion.      Cervical back: Normal range of motion and neck supple. No tenderness.   Lymphadenopathy:      Cervical: No cervical adenopathy.      Upper Body:      Right upper body: No supraclavicular, axillary or pectoral adenopathy.      Left upper body: No supraclavicular, axillary or pectoral adenopathy.      Lower Body: No right inguinal adenopathy. No left inguinal adenopathy.   Neurological:      General: No focal deficit present.      Mental Status: She is alert and oriented to person, place, and time.      Cranial Nerves: No cranial nerve deficit.   Skin:     General: Skin is warm and dry.   Psychiatric:         Mood and Affect: Mood normal.         Behavior: Behavior normal.         Thought Content: Thought content normal.         Judgment: Judgment normal.   Vitals and nursing note reviewed.       Assessment    Diagnoses and all orders for this visit:    1. Women's annual routine gynecological examination (Primary)    2. Encounter for surveillance of contraceptive pills  -     Tyblume 0.1-20 MG-MCG chewable tablet; Chew 1 tablet Daily.  Dispense: 84 tablet; Refill: 3         Plan     1) Breast Health - Clinical breast exam & mammogram yearly, Self breast awareness. Schedule screening mammogram.   2) Pap - up to date  3) STD-no  4) Smoking status- former smoker  5) Colon health - screening colonoscopy recommended if not up to date  6) Obese by BMI 35.3  7) Bone health -  Weight bearing exercise, dietary calcium recommendations and vitamin D  reviewed.   8) Encouraged 150 minutes of exercise per week if not medically contraindicated  9) New MEGAN prescribed. She will call if not covered by insurance     Follow up prn and one year    Consuelo Hernandez, APRN  8/28/2023  14:49 EDT

## 2023-11-22 RX ORDER — CITALOPRAM 40 MG/1
40 TABLET ORAL DAILY
Qty: 30 TABLET | Refills: 0 | Status: SHIPPED | OUTPATIENT
Start: 2023-11-22

## 2023-12-22 ENCOUNTER — OFFICE VISIT (OUTPATIENT)
Dept: INTERNAL MEDICINE | Facility: CLINIC | Age: 48
End: 2023-12-22
Payer: COMMERCIAL

## 2023-12-22 ENCOUNTER — HOSPITAL ENCOUNTER (OUTPATIENT)
Dept: GENERAL RADIOLOGY | Facility: HOSPITAL | Age: 48
Discharge: HOME | End: 2023-12-22
Admitting: FAMILY MEDICINE
Payer: COMMERCIAL

## 2023-12-22 VITALS
BODY MASS INDEX: 35.4 KG/M2 | OXYGEN SATURATION: 95 % | HEIGHT: 69 IN | WEIGHT: 239 LBS | DIASTOLIC BLOOD PRESSURE: 60 MMHG | SYSTOLIC BLOOD PRESSURE: 124 MMHG | TEMPERATURE: 98 F | HEART RATE: 110 BPM

## 2023-12-22 DIAGNOSIS — M54.50 CHRONIC BILATERAL LOW BACK PAIN WITHOUT SCIATICA: ICD-10-CM

## 2023-12-22 DIAGNOSIS — G89.29 CHRONIC BILATERAL LOW BACK PAIN WITHOUT SCIATICA: ICD-10-CM

## 2023-12-22 DIAGNOSIS — F41.8 MIXED ANXIETY DEPRESSIVE DISORDER: Primary | ICD-10-CM

## 2023-12-22 DIAGNOSIS — G47.09 OTHER INSOMNIA: ICD-10-CM

## 2023-12-22 PROCEDURE — 99214 OFFICE O/P EST MOD 30 MIN: CPT | Performed by: FAMILY MEDICINE

## 2023-12-22 PROCEDURE — 72100 X-RAY EXAM L-S SPINE 2/3 VWS: CPT

## 2023-12-22 RX ORDER — CITALOPRAM 40 MG/1
40 TABLET ORAL DAILY
Qty: 90 TABLET | Refills: 1 | Status: SHIPPED | OUTPATIENT
Start: 2023-12-22

## 2023-12-22 RX ORDER — BUPROPION HYDROCHLORIDE 300 MG/1
300 TABLET ORAL DAILY
Qty: 90 TABLET | Refills: 1 | Status: SHIPPED | OUTPATIENT
Start: 2023-12-22

## 2023-12-22 NOTE — PROGRESS NOTES
Subjective   Marcia Shi is a 48 y.o. female.   Chief Complaint   Patient presents with    Depression    Insomnia       History of Present Illness     Depression with anxiety-on Celexa 40 mg a day and Wellbutrin  mg a day.  She takes it every day.  She has no side effects.  Mood is normal.  No excessive worries. PHQ-9 at 3 from 4, NICKY-7 at 1 from 3. No suicidal ideations.  No aggressive behaviors.  We attempted weaning off citalopram 2 times in the last years. Patient was not able to function.     Insomnia-she takes trazodone 50 mg at bedtime and melatonin.  She gets 8-10 hours of sleep.  No side effects.  No drowsiness.    Lower back pain-started years ago.  There was no known injury.  It is on and off.  Dull, achy.  Intensity 3-4 out of 10.  Sometimes radiating to the right thigh.  There is no numbness or tingling associated.  The pain is worse when she walks for longer period of time or stands for longer period of time.  Not affected by bending over.  She also complains of achiness in upper back.    Review of Systems   Musculoskeletal:  Positive for back pain.   Neurological:  Negative for weakness and numbness.   Psychiatric/Behavioral:  Negative for suicidal ideas.          Objective   Wt Readings from Last 3 Encounters:   12/22/23 108 kg (239 lb)   08/28/23 109 kg (239 lb 3.2 oz)   04/24/23 109 kg (241 lb)      Vitals:    12/22/23 1442   BP: 124/60   Pulse: 110   Temp: 98 °F (36.7 °C)   SpO2: 95%     Temp Readings from Last 3 Encounters:   12/22/23 98 °F (36.7 °C)   04/24/23 98.4 °F (36.9 °C)   01/15/23 98.7 °F (37.1 °C)     BP Readings from Last 3 Encounters:   12/22/23 124/60   08/28/23 134/89   04/24/23 122/80     Pulse Readings from Last 3 Encounters:   12/22/23 110   04/24/23 88   12/27/22 65     Body mass index is 35.28 kg/m².    Physical Exam  Constitutional:       Appearance: She is well-developed.   Cardiovascular:      Rate and Rhythm: Normal rate and regular rhythm.      Heart sounds:  Normal heart sounds.   Pulmonary:      Effort: Pulmonary effort is normal.      Breath sounds: Normal breath sounds.   Musculoskeletal:         General: Normal range of motion.      Lumbar back: Normal. No deformity or tenderness.      Comments: SLR negative BL.   Skin:     General: Skin is warm and dry.      Findings: No erythema or lesion.   Neurological:      Deep Tendon Reflexes: Reflexes are normal and symmetric.   Psychiatric:         Behavior: Behavior normal.         Assessment & Plan   Diagnoses and all orders for this visit:    1. Mixed anxiety depressive disorder (Primary)    2. Other insomnia    3. Chronic bilateral low back pain without sciatica  -     XR Spine Lumbar 2 or 3 View  -     Ambulatory Referral to Physical Therapy    Other orders  -     buPROPion XL (Wellbutrin XL) 300 MG 24 hr tablet; Take 1 tablet by mouth Daily.  Dispense: 90 tablet; Refill: 1  -     citalopram (CeleXA) 40 MG tablet; Take 1 tablet by mouth Daily.  Dispense: 90 tablet; Refill: 1        Depression anxiety-continue current treatment.  Follow-up in 6 months.    Insomnia-continue current treatment.  Follow-up in 6 months.    Lower back pain-we are checking x-ray.  NSAIDs as needed.  I am referring patient to physical therapy.  Follow-up as needed.

## 2024-01-07 NOTE — PROGRESS NOTES
[facility] [place]  [address] [place] [address]  Phone [phone]  Fax [phone]      Physical Therapy Initial Evaluation and Plan of Care    Patient: Marcia Shi   : 1975  Diagnosis/ICD-10 Code:  Chronic right-sided low back pain without sciatica [M54.50, G89.29]  Referring practitioner: Iesha Britt MD  Date of Initial Visit: 2024  Today's Date: 2024  Patient seen for 1 session         Visit Diagnoses:    ICD-10-CM ICD-9-CM   1. Chronic right-sided low back pain without sciatica  M54.50 724.2    G89.29 338.29   2. Upper back pain  M54.9 724.5         Subjective Questionnaire: Oswestry: 22    Lumbar Xray 23:  There is a normal lumbar lordosis. No abnormalities of alignment are  identified. Vertebral body heights are maintained. There is mild disc  height loss at few levels. There are tiny degenerative endplate  osteophytes at a few levels. There is lower lumbar predominant facet  osteoarthropathy.      Subjective Evaluation    History of Present Illness  Onset date: .  Mechanism of injury: Right side lower back pain in  with up and down.  Tried NSAIDs and went away.  Still having some pain intermittently.  After work it increases in back and upper back.  Intermittent. Insidious start.Lower back pain-started years ago.  The pain is worse when she walks for longer period of time or stands for longer period of time.  She also complains of achiness in upper back when her low back pain starts but no particular activity aggravates it.      Subjective comment: Patient denies numbness, tingling, or radiating pain.  Does possibly have some giving way.  PMH: no previous injury but states she had scoliosis as a child and did an exercise class.  Sleeping ok but I do feel it.  Best is supine reclined. PLOF: had daughter, walks dog, not much exercise.  Patient Occupation: [employer] - sit/standing, bending, not much lifting.  Works 4 10 hour days.    Precautions and Work Restrictions: NonePain  Current pain ratin  At worst pain ratin  Location: right lower back worst at the day goes on then shifts to shoulder blade area  Quality: dull ache  Alleviating factors: stiff when first up but sitting helps.  Aggravating factors: ambulation (walking dog, upper back no aggravating factos)  Progression: no change    Social Support  Lives in: multiple-level home  Lives with: spouse and young children (12 yo daughter)    Diagnostic Tests  X-ray: normal    Treatments  No previous or current treatments  Patient Goals  Patient goals for therapy: decreased pain             Objective        Special Questions      Additional Special Questions  (-) valsalva, no night pain, no saddle numbness      Postural Observations  Seated posture: fair  Standing posture: fair    Additional Postural Observation Details  Supine right iliac crest superior, ASIS superior, prone PSIS inferior.  Mild relief with B traction/leg pull.    Palpation   Left   Hypertonic in the cervical paraspinals.   Tenderness of the cervical paraspinals.     Right   Hypertonic in the cervical paraspinals. Tenderness of the cervical paraspinals.     Additional Palpation Details  Tender B T8-10 paraspinals    Tenderness   Cervical Spine   Tenderness in the spinous process.     Additional Tenderness Details  T8-9    Neurological Testing     Sensation   Cervical/Thoracic   Left   Intact: light touch    Right   Intact: light touch    Lumbar   Left   Intact: light touch    Right   Intact: light touch    Reflexes   Left   Patellar (L4): normal (2+)  Achilles (S1): trace (1+)    Right   Patellar (L4): normal (2+)  Achilles (S1): trace (1+)    Active Range of Motion   Cervical/Thoracic Spine   Cervical    Flexion: WFL  Extension: WFL  Left lateral flexion: WFL  Right lateral flexion: WFL  Left rotation: WFL  Right rotation: WFL    Lumbar   Flexion: 80 (end range and on return) degrees with pain  Extension: 10 degrees    Left lateral flexion: 30 (relief) degrees   Right lateral flexion: 25 (stiff) degrees   Left rotation: Active left lumbar rotation: 75% WFL  Right rotation: Active right lumbar rotation: 75%     Additional Active Range of Motion Details  Shoulder ROM B WFL, mild thoracic pain with end range flexion    Passive Range of Motion     Additional Passive Range of Motion Details  No pain with PA glide.  Normal pivms    Strength/Myotome Testing     Lumbar   Left   Heel walk: normal  Toe walk: normal    Right   Heel walk: normal  Toe walk: normal    Left Hip   Planes of Motion   Flexion: 5    Right Hip   Planes of Motion   Flexion: 5    Left Knee   Flexion: 5  Extension: 5    Right Knee   Flexion: 5  Extension: 5    Left Ankle/Foot   Dorsiflexion: 5  Plantar flexion: 5  Great toe extension: 5    Right Ankle/Foot   Dorsiflexion: 5  Plantar flexion: 5  Great toe extension: 5    Tests     Lumbar     Left   Negative crossed SLR, passive SLR, lumbar push and valsalva.     Right   Negative crossed SLR, passive SLR, lumbar push and valsalva.     Left Pelvic Girdle/Sacrum   Negative: gapping.     Right Pelvic Girdle/Sacrum   Positive: thigh thrust.   Negative: sacrum compression, gapping and sacral spring.     Left Hip   Negative Gaenslen's.     Right Hip   Positive Gaenslen's.       Patient was educated on findings of evaluation, purpose of treatment, and goals for therapy.  Treatment options discussed and questions answered.  Patient was educated on anatomy/body mechanics/exercises/self treatment/pain relief techniques.     Access Code: [email or web address]  URL: [email or web address]  Date: 01/08/2024  Prepared by: Margy Schafer    Exercises  - Supine Lower Trunk Rotation  - 1 x daily - 7 x weekly - 1 sets - 10 reps  - Supine Piriformis Stretch with Foot on Ground  - 1 x daily - 7 x weekly - 1 sets - 4 reps - 15 hold  - Supine Figure 4 Piriformis Stretch  - 1 x daily - 7 x weekly - 3 sets - 10 reps  - Bridge  - 1 x daily - 7 x weekly -  1 sets - 10 reps  - Diaphragmatic Breathing with Pelvic Tilt in Hooklying  - 1 x daily - 7 x weekly - 1 sets - 10 reps  - Seated Flexion Stretch with Swiss Ball  - 1 x daily - 7 x weekly - 1 sets - 10 reps    Patient Education  - Managing Back Pain  - Lifting Techniques  - Sacroiliac Joint Dysfunction    Assessment & Plan       Assessment  Impairments: abnormal or restricted ROM, activity intolerance, impaired physical strength, lacks appropriate home exercise program and pain with function   Functional limitations: carrying objects, lifting, sleeping, walking, uncomfortable because of pain, sitting and standing   Assessment details: Patient is a 48 year-old female referred to physical therapy for right sided low back pain. She does not appear to have any radiating symptoms into the legs, no paresthesias, and no LE weakness.  She does have tenderness and muscle guarding in the right lower lumbar and hip with tenderness along the right SI joint.  Neurological s/s are normal, SLR is normal, but she did have pain with SI testing on the right.  She does have some malalignment in the right SI in standing and lying.  She presents with a stable clinical presentation.  She has no comorbidities that should affect care but personal factors of sit/stand 10 hour shifts and a previous history of scoliosis as a child.  Patient is appropriate for skilled physical therapy in order to reduce pain, increase ease with daily mobility and continue her independent level of function.      Prognosis: good    Goals  Plan Goals: STG X 2 weeks. Pt will:  1. Tolerate initial HEP  2. Perform advanced TrA retraining exercises with lumbar stability.  3. Demonstrate strategies to relieve low back pain with stretching, positional traction, etc.  4.  Patient will demonstrate correct body mechanics and posture with cueing.  LTG X 8 weeks  1.  Patient to have normal pelvic alignment.  2.  At least 4/5 B hip and core strength.  3.  Full ROM with hips  and lumbar spine without discomfort.   4.  (-) special tests.  5.  Patient to return to work with tolerable symptoms while staying in normal pelvic alignment.      Plan  Therapy options: will be seen for skilled therapy services  Planned modality interventions: cryotherapy, dry needling, electrical stimulation/Russian stimulation, TENS, ultrasound, traction and thermotherapy (hydrocollator packs)  Planned therapy interventions: abdominal trunk stabilization, manual therapy, ADL retraining, body mechanics training, neuromuscular re-education, postural training, strengthening, functional ROM exercises and stretching  Frequency: 2x week  Duration in weeks: 8  Treatment plan discussed with: patient        History # of Personal Factors and/or Comorbidities: LOW (0)  Examination of Body System(s): # of elements: LOW (1-2)  Clinical Presentation: STABLE   Clinical Decision Making: LOW       Timed:         Manual Therapy:    -     mins  05302;     Therapeutic Exercise:    15     mins  32857;     Neuromuscular Nicholas:    -    mins  74678;    Therapeutic Activity:     -     mins  52011;     Gait Training:      -     mins  55411;     Ultrasound:     8     mins  95780;    Ionto                               -    mins   96920  Self Care                       10     mins   28749  Orthotic fit/train              -   mins  94282    Un-Timed:  Electrical Stimulation:    15     mins  74496 ( );  Dry Needling     -     mins self-pay  Traction     -     mins 70079  Low Eval     -     Mins  56527  Mod Eval     25     Mins  88694  High Eval                       -     Mins  72751        Timed Treatment:   33   mins   Total Treatment:     90   mins          PT: Margy Schafer PT, CIDN     License Number: [id number]  Electronically signed by Margy Schafer PT, 01/08/24, 12:03 PM EST    Certification Period: 1/8/2024 thru 4/6/2024  I certify that the therapy services are furnished while this patient is under my care.  The services outlined  above are required by this patient, and will be reviewed every 90 days.         Physician Signature:__________________________________________________    PHYSICIAN: Iesha Britt MD      DATE:     Please sign and return via fax to [phone]. Thank you, [facility].

## 2024-01-08 ENCOUNTER — TREATMENT (OUTPATIENT)
Dept: PHYSICAL THERAPY | Facility: CLINIC | Age: 49
End: 2024-01-08
Payer: COMMERCIAL

## 2024-01-08 DIAGNOSIS — G89.29 CHRONIC RIGHT-SIDED LOW BACK PAIN WITHOUT SCIATICA: Primary | ICD-10-CM

## 2024-01-08 DIAGNOSIS — M54.50 CHRONIC RIGHT-SIDED LOW BACK PAIN WITHOUT SCIATICA: Primary | ICD-10-CM

## 2024-01-08 DIAGNOSIS — M54.9 UPPER BACK PAIN: ICD-10-CM

## 2024-01-08 PROCEDURE — 97110 THERAPEUTIC EXERCISES: CPT | Performed by: PHYSICAL THERAPIST

## 2024-01-08 PROCEDURE — 97035 APP MDLTY 1+ULTRASOUND EA 15: CPT | Performed by: PHYSICAL THERAPIST

## 2024-01-08 PROCEDURE — 97535 SELF CARE MNGMENT TRAINING: CPT | Performed by: PHYSICAL THERAPIST

## 2024-01-08 PROCEDURE — 97162 PT EVAL MOD COMPLEX 30 MIN: CPT | Performed by: PHYSICAL THERAPIST

## 2024-01-08 PROCEDURE — 97014 ELECTRIC STIMULATION THERAPY: CPT | Performed by: PHYSICAL THERAPIST

## 2024-01-15 ENCOUNTER — TREATMENT (OUTPATIENT)
Dept: PHYSICAL THERAPY | Facility: CLINIC | Age: 49
End: 2024-01-15
Payer: COMMERCIAL

## 2024-01-15 DIAGNOSIS — G89.29 CHRONIC RIGHT-SIDED LOW BACK PAIN WITHOUT SCIATICA: Primary | ICD-10-CM

## 2024-01-15 DIAGNOSIS — M54.9 UPPER BACK PAIN: ICD-10-CM

## 2024-01-15 DIAGNOSIS — M54.50 CHRONIC RIGHT-SIDED LOW BACK PAIN WITHOUT SCIATICA: Primary | ICD-10-CM

## 2024-01-15 PROCEDURE — 97014 ELECTRIC STIMULATION THERAPY: CPT | Performed by: PHYSICAL THERAPIST

## 2024-01-15 PROCEDURE — 97110 THERAPEUTIC EXERCISES: CPT | Performed by: PHYSICAL THERAPIST

## 2024-01-15 PROCEDURE — 97012 MECHANICAL TRACTION THERAPY: CPT | Performed by: PHYSICAL THERAPIST

## 2024-01-15 PROCEDURE — 97035 APP MDLTY 1+ULTRASOUND EA 15: CPT | Performed by: PHYSICAL THERAPIST

## 2024-01-15 PROCEDURE — 97530 THERAPEUTIC ACTIVITIES: CPT | Performed by: PHYSICAL THERAPIST

## 2024-01-15 RX ORDER — TRAZODONE HYDROCHLORIDE 50 MG/1
50 TABLET ORAL NIGHTLY
Qty: 90 TABLET | Refills: 0 | Status: SHIPPED | OUTPATIENT
Start: 2024-01-15

## 2024-01-15 NOTE — PROGRESS NOTES
[facility] [place]  [address] [place] [address]  Phone [phone]  Fax [phone]      Physical Therapy Daily Treatment Note      Patient: Marcia Shi   : 1975  Referring practitioner: Iesha Britt MD  Date of Initial Visit: Type: THERAPY  Noted: 2024  Today's Date: 1/15/2024  Patient seen for 2 sessions       Visit Diagnoses:    ICD-10-CM ICD-9-CM   1. Chronic right-sided low back pain without sciatica  M54.50 724.2    G89.29 338.29   2. Upper back pain  M54.9 724.5       Marcia Shi reports: lower back felt better after last treatment.  Upper about the same after work. Longer pain than lower back at upper back. Denies numbness or tingling    Subjective     Objective        Special Questions      Additional Special Questions  No valsalva      Palpation   Left   Hypertonic in the cervical paraspinals and latissimus.   Tenderness of the cervical paraspinals.     Right   Hypertonic in the cervical paraspinals, latissimus and middle trapezius. Tenderness of the cervical paraspinals, latissimus and middle trapezius.     Cervical Spine Comments  Left cervical paraspinals: thoracic B lower.     Tenderness   Cervical Spine   Tenderness in the spinous process.     Active Range of Motion   Cervical/Thoracic Spine   Normal active range of motion    Additional Active Range of Motion Details  Mild tightness pain end range flexion, back with extension but difficult to isolate    Passive Range of Motion     Additional Passive Range of Motion Details  PA glides decreased thoracic especially lower thoracic.  No radiation or pain.    Strength/Myotome Testing     Left Shoulder     Planes of Motion   Flexion: 5   Extension: 5   Abduction: 5   External rotation at 0°: 5   Internal rotation at 0°: 5     Isolated Muscles   Middle trapezius: 4+     Right Shoulder     Planes of Motion   Flexion: 5   Extension: 4+   Abduction: 4+   External rotation at 0°: 5    Internal rotation at 0°: 5     Isolated Muscles   Lower trapezius: 4   Middle trapezius: 3     Left Elbow   Flexion: 5  Extension: 5    Right Elbow   Flexion: 4+  Extension: 4+    Left Wrist/Hand   Wrist extension: 5  Wrist flexion: 5    Right Wrist/Hand   Wrist extension: 5  Wrist flexion: 5    Additional Strength Details  No pain right just weakness    Tests       Thoracic   Negative slump.     Additional Tests Details         See Exercise, Manual, and Modality Logs for complete treatment.   R upslip with sup ASIS and inferior PSIS    Assessment/Plan  R lower thoracic and lateral/inferior scapular muscles most involved and does not appear to have any neurological component with likely postural component.  Added postural exercises and traction to treatment today.  Assess and continue as appropriate.    Timed:         Manual Therapy:    5     mins  01335;     Therapeutic Exercise:    15     mins  68422;     Neuromuscular Nicholas:    -    mins  83313;    Therapeutic Activity:     15     mins  80891;     Gait Training:      -     mins  22176;     Ultrasound:     8     mins  86337;    Ionto                               -    mins   69712  Self Care                       -     mins   46693  Orthotic training    -     mins 05851      Un-Timed:  Electrical Stimulation:    15     mins  98810 ( );  Dry Needling     -     mins self-pay  Traction     15     mins 06936      Timed Treatment:   43   mins   Total Treatment:     90   mins    Margy Schafer PT, JOHANA  [place] License: [id number]

## 2024-01-22 ENCOUNTER — TREATMENT (OUTPATIENT)
Dept: PHYSICAL THERAPY | Facility: CLINIC | Age: 49
End: 2024-01-22
Payer: COMMERCIAL

## 2024-01-22 DIAGNOSIS — G89.29 CHRONIC RIGHT-SIDED LOW BACK PAIN WITHOUT SCIATICA: Primary | ICD-10-CM

## 2024-01-22 DIAGNOSIS — M54.9 UPPER BACK PAIN: ICD-10-CM

## 2024-01-22 DIAGNOSIS — M54.50 CHRONIC RIGHT-SIDED LOW BACK PAIN WITHOUT SCIATICA: Primary | ICD-10-CM

## 2024-01-22 PROCEDURE — 97530 THERAPEUTIC ACTIVITIES: CPT | Performed by: PHYSICAL THERAPIST

## 2024-01-22 PROCEDURE — 97012 MECHANICAL TRACTION THERAPY: CPT | Performed by: PHYSICAL THERAPIST

## 2024-01-22 PROCEDURE — 97035 APP MDLTY 1+ULTRASOUND EA 15: CPT | Performed by: PHYSICAL THERAPIST

## 2024-01-22 PROCEDURE — 97110 THERAPEUTIC EXERCISES: CPT | Performed by: PHYSICAL THERAPIST

## 2024-01-22 NOTE — PROGRESS NOTES
[facility] [place]  [address] [place] [address] [place] [address]  Phone [phone]  Fax [phone]      Physical Therapy Daily Treatment Note      Patient: Marcia Shi   : 1975  Referring practitioner: Iesha Britt MD  Date of Initial Visit: Type: THERAPY  Noted: 2024  Today's Date: 2024  Patient seen for 3 sessions       Visit Diagnoses:    ICD-10-CM ICD-9-CM   1. Chronic right-sided low back pain without sciatica  M54.50 724.2    G89.29 338.29   2. Upper back pain  M54.9 724.5       Marcia Shi reports: traction helped but short term relief.  More upper back than lower now.  Bridge is painful.    Subjective     Objective   See Exercise, Manual, and Modality Logs for complete treatment.   (-) long sit test  (+) upslip, PINN R    Assessment/Plan  SI continues in Right upslip with increased tension. Held bridge due to pain at lower lumbar.  Modified exercises to avoid pain/exacerbation.  Improved SI alignment but still with tightness and upslip.    Timed:         Manual Therapy:    8     mins  74711;     Therapeutic Exercise:    15     mins  80813;     Neuromuscular Nicholas:    -    mins  47583;    Therapeutic Activity:     -     mins  60289;     Gait Training:      -     mins  11898;     Ultrasound:     8     mins  68115;    Ionto                               -    mins   39125  Self Care                       -     mins   71912  Orthotic training    -     mins 65970      Un-Timed:  Electrical Stimulation:    -     mins  72260 ( );  Dry Needling     -     mins self-pay  Traction     15     mins 04369      Timed Treatment:   31   mins   Total Treatment:     50   mins    Margy Schafer PT, ELEAZARN  [place] License: [id number]

## 2024-01-29 ENCOUNTER — TREATMENT (OUTPATIENT)
Dept: PHYSICAL THERAPY | Facility: CLINIC | Age: 49
End: 2024-01-29
Payer: COMMERCIAL

## 2024-01-29 DIAGNOSIS — G89.29 CHRONIC RIGHT-SIDED LOW BACK PAIN WITHOUT SCIATICA: Primary | ICD-10-CM

## 2024-01-29 DIAGNOSIS — M54.9 UPPER BACK PAIN: ICD-10-CM

## 2024-01-29 DIAGNOSIS — M54.50 CHRONIC RIGHT-SIDED LOW BACK PAIN WITHOUT SCIATICA: Primary | ICD-10-CM

## 2024-01-29 NOTE — PROGRESS NOTES
[facility] [place]  [address] [place] [address] [place] [address]  Phone [phone]  Fax [phone]      Physical Therapy Daily Treatment Note      Patient: Marcia Shi   : 1975  Referring practitioner: Iesha Britt MD  Date of Initial Visit: Type: THERAPY  Noted: 2024  Today's Date: 2024  Patient seen for 4 sessions       Visit Diagnoses:    ICD-10-CM ICD-9-CM   1. Chronic right-sided low back pain without sciatica  M54.50 724.2    G89.29 338.29   2. Upper back pain  M54.9 724.5       Marcia Shi reports: Right side of leg is very sore.  I think I'm over stretching. Only pain upper back with standing.  Lower is better.  Traction helps both areas.    Subjective     Objective   See Exercise, Manual, and Modality Logs for complete treatment.   Right hip extension 3/5   Right upslip, PINN  Noted thoracic curve to left that may be contributing to scapular tightness.    Assessment/Plan  Added right sided stretching for mild thoracic curvature.  Right UE/LE with greater weakness than left.  Her symptoms do improve with traction and manual stretching.  D/c's right ITB stretch as it was painful but piriformis well tolerated.      Timed:         Manual Therapy:    10     mins  37548;     Therapeutic Exercise:    15     mins  41594;     Neuromuscular Nicholas:    -    mins  98199;    Therapeutic Activity:     11    mins  86941;     Gait Training:      -     mins  47498;     Ultrasound:     8     mins  55420;    Ionto                               -    mins   84466  Self Care                       -     mins   80587  Orthotic training    -     mins 85211      Un-Timed:  Electrical Stimulation:    -     mins  73476 ( );  Dry Needling     -     mins self-pay  Traction     15     mins 42456      Timed Treatment:   45   mins   Total Treatment:     70   mins    Margy Schafer PT, CIDN  [place] License: [id number]

## 2024-02-05 ENCOUNTER — TREATMENT (OUTPATIENT)
Dept: PHYSICAL THERAPY | Facility: CLINIC | Age: 49
End: 2024-02-05
Payer: COMMERCIAL

## 2024-02-05 DIAGNOSIS — G89.29 CHRONIC RIGHT-SIDED LOW BACK PAIN WITHOUT SCIATICA: Primary | ICD-10-CM

## 2024-02-05 DIAGNOSIS — M54.9 UPPER BACK PAIN: ICD-10-CM

## 2024-02-05 DIAGNOSIS — M54.50 CHRONIC RIGHT-SIDED LOW BACK PAIN WITHOUT SCIATICA: Primary | ICD-10-CM

## 2024-02-05 NOTE — PROGRESS NOTES
[facility] [place]  [address] [place] [address]  Phone [phone]  Fax [phone]      Physical Therapy Daily Treatment Note      Patient: Marcia Shi   : 1975  Referring practitioner: Iesha Britt MD  Date of Initial Visit: Type: THERAPY  Noted: 2024  Today's Date: 2024  Patient seen for 5 sessions       Visit Diagnoses:    ICD-10-CM ICD-9-CM   1. Chronic right-sided low back pain without sciatica  M54.50 724.2    G89.29 338.29   2. Upper back pain  M54.9 724.5       Marcia Shi reports: mostly upper back now but only if I stand for 4-5 hours at work.  My low back is much better.  I feel comfortable with the exercises.    Subjective     Objective   See Exercise, Manual, and Modality Logs for complete treatment.   No significant tenderness, mild increased tone right thoracic pvms, ql  Mild upslip right only  (-) SLR  Transfers with normal mobility  B hip extension, flexion 4/5  Right hip abduction 4-/5 functionally  (-) thigh thrust    Assessment/Plan  Patient with improved symptoms, decreased tenderness, and improved strength. She is independent with her HEP and mostly needs continued strengthening of her postural muscles to tolerate work and ADL's for longer periods.  No s/s of need for further medical management.  If her symptoms return or she has any issues, she will contact us or her MD.    Plan Goals: STG X 2 weeks. MET  Pt will:  1. Tolerate initial HEP  2. Perform advanced TrA retraining exercises with lumbar stability.  3. Demonstrate strategies to relieve low back pain with stretching, positional traction, etc.  4.  Patient will demonstrate correct body mechanics and posture with cueing.  LTG X 8 weeks  1.  Patient to have normal pelvic alignment. Progressing  2.  At least 4/5 B hip and core strength. Progressing  3.  Full ROM with hips and lumbar spine without discomfort. Progressing  4.  (-) special tests.  Progressing  5.  Patient to return to work with tolerable symptoms while staying in normal pelvic alignment. Progressing.  Timed:         Manual Therapy:    8     mins  89949;     Therapeutic Exercise:    10     mins  18586;     Neuromuscular Nicholas:    -    mins  78937;    Therapeutic Activity:     10     mins  05047;     Gait Training:      -     mins  55796;     Ultrasound:     8     mins  84228;    Ionto                               -    mins   55917  Self Care                       -     mins   53655  Orthotic training    -     mins 47570      Un-Timed:  Electrical Stimulation:    -     mins  49515 ( );  Dry Needling     -     mins self-pay  Traction     15     mins 69857      Timed Treatment:   36   mins   Total Treatment:     60   mins    Margy Schafer PT, CIDN  [place] License: [id number]

## 2024-02-25 ENCOUNTER — TELEMEDICINE (OUTPATIENT)
Dept: FAMILY MEDICINE CLINIC | Facility: TELEHEALTH | Age: 49
End: 2024-02-25
Payer: COMMERCIAL

## 2024-02-25 DIAGNOSIS — J02.0 STREP THROAT: Primary | ICD-10-CM

## 2024-02-25 RX ORDER — BROMPHENIRAMINE MALEATE, PSEUDOEPHEDRINE HYDROCHLORIDE, AND DEXTROMETHORPHAN HYDROBROMIDE 2; 30; 10 MG/5ML; MG/5ML; MG/5ML
SYRUP ORAL
COMMUNITY
Start: 2024-02-24

## 2024-02-25 RX ORDER — PREDNISONE 20 MG/1
20 TABLET ORAL DAILY
Qty: 7 TABLET | Refills: 0 | Status: SHIPPED | OUTPATIENT
Start: 2024-02-25 | End: 2024-03-03

## 2024-02-25 RX ORDER — CLARITHROMYCIN 250 MG/1
250 TABLET, FILM COATED ORAL 2 TIMES DAILY
Qty: 20 TABLET | Refills: 0 | Status: SHIPPED | OUTPATIENT
Start: 2024-02-25 | End: 2024-03-06

## 2024-02-25 RX ORDER — BENZONATATE 200 MG/1
1 CAPSULE ORAL 3 TIMES DAILY
COMMUNITY
Start: 2024-02-23

## 2024-02-25 RX ORDER — AMOXICILLIN 500 MG/1
TABLET, FILM COATED ORAL
COMMUNITY
Start: 2024-02-24

## 2024-02-25 NOTE — PATIENT INSTRUCTIONS
Strep Throat, Adult  Strep throat is an infection of the throat. It is caused by germs (bacteria). Strep throat is common during the cold months of the year. It mostly affects children who are 5-15 years old. However, people of all ages can get it at any time of the year. This infection spreads from person to person through coughing, sneezing, or having close contact.  What are the causes?  This condition is caused by the Streptococcus pyogenes germ.  What increases the risk?  You care for young children. Children are more likely to get strep throat and may spread it to others.  You go to crowded places. Germs can spread easily in such places.  You kiss or touch someone who has strep throat.  What are the signs or symptoms?  Fever or chills.  Redness, swelling, or pain in the tonsils or throat.  Pain or trouble when swallowing.  White or yellow spots on the tonsils or throat.  Tender glands in the neck and under the jaw.  Bad breath.  Red rash all over the body. This is rare.  How is this treated?  Medicines that kill germs (antibiotics).  Medicines that treat pain or fever. These include:  Ibuprofen or acetaminophen.  Aspirin, only for people who are over the age of 18.  Cough drops.  Throat sprays.  Follow these instructions at home:  Medicines    Take over-the-counter and prescription medicines only as told by your doctor.  Take your antibiotic medicine as told by your doctor. Do not stop taking the antibiotic even if you start to feel better.  Eating and drinking    If you have trouble swallowing, eat soft foods until your throat feels better.  Drink enough fluid to keep your pee (urine) pale yellow.  To help with pain, you may have:  Warm fluids, such as soup and tea.  Cold fluids, such as frozen desserts or popsicles.  General instructions  Rinse your mouth (gargle) with a salt-water mixture 3-4 times a day or as needed. To make a salt-water mixture, dissolve ½-1 tsp (3-6 g) of salt in 1 cup (237 mL) of warm  water.  Rest as much as you can.  Stay home from work or school until you have been taking antibiotics for 24 hours.  Do not smoke or use any products that contain nicotine or tobacco. If you need help quitting, ask your doctor.  Keep all follow-up visits.  How is this prevented?    Do not share food, drinking cups, or personal items. They can cause the germs to spread.  Wash your hands well with soap and water. Make sure that all people in your house wash their hands well.  Have family members tested if they have a fever or a sore throat. They may need an antibiotic if they have strep throat.  Contact a doctor if:  You have swelling in your neck that keeps getting bigger.  You get a rash, cough, or earache.  You cough up a thick fluid that is green, yellow-brown, or bloody.  You have pain that does not get better with medicine.  Your symptoms get worse instead of getting better.  You have a fever.  Get help right away if:  You vomit.  You have a very bad headache.  Your neck hurts or feels stiff.  You have chest pain or are short of breath.  You have drooling, very bad throat pain, or changes in your voice.  Your neck is swollen, or the skin gets red and tender.  Your mouth is dry, or you are peeing less than normal.  You keep feeling more tired or have trouble waking up.  Your joints are red or painful.  These symptoms may be an emergency. Do not wait to see if the symptoms will go away. Get help right away. Call your local emergency services (911 in the [place]  Summary  Strep throat is an infection of the throat. It is caused by germs (bacteria).  This infection can spread from person to person through coughing, sneezing, or having close contact.  Take your medicines, including antibiotics, as told by your doctor. Do not stop taking the antibiotic even if you start to feel better.  To prevent the spread of germs, wash your hands well with soap and water. Have others do the same. Do not share food, drinking cups,  or personal items.  Get help right away if you have a bad headache, chest pain, shortness of breath, a stiff or painful neck, or you vomit.  This information is not intended to replace advice given to you by your health care provider. Make sure you discuss any questions you have with your health care provider.  Document Revised: 04/12/2022 Document Reviewed: 04/12/2022  Elsevier Patient Education © 2023 [employer]

## 2024-02-25 NOTE — PROGRESS NOTES
Chief Complaint   Patient presents with    Sore Throat       Video Visit Reason:   Free Text Description: Strep throat  Subjective   Marcia Shi is a 49 y.o. female.     History of Present Illness  Sore throat with painful swallowing and swollen lymph nodes. She went in yesterday to be seen and tested positive for strep. She was given amoxicillin but says that she is worse. She has not responded to amoxicillin in the past and feels that she needs a different antibiotic due to her history. She has unmeasured, subjective fever, chills and fatigue, headache and body aches.  Sore Throat   This is a new problem. The current episode started in the past 7 days. The problem has been worse. The maximum temperature recorded prior to her arrival was unmeasured. Associated symptoms include coughing, headaches, a hoarse voice, neck pain, swollen glands and trouble swallowing. Pertinent negatives include no shortness of breath or vomiting.       The following portions of the patient's history were reviewed and updated as appropriate: allergies, current medications, past medical history, and problem list.      Past Medical History:   Diagnosis Date    Ganglion cyst of wrist     UTI (urinary tract infection)     Vitamin D deficiency      Social History     Socioeconomic History    Marital status:    Tobacco Use    Smoking status: Former     Packs/day: 0.50     Years: 10.00     Additional pack years: 0.00     Total pack years: 5.00     Types: Cigarettes     Quit date: 1/3/2005     Years since quittin.1    Smokeless tobacco: Never    Tobacco comments:     quit 15 yrs ago   Vaping Use    Vaping Use: Never used   Substance and Sexual Activity    Alcohol use: Yes     Comment: occa    Drug use: Never    Sexual activity: Yes     Partners: Male     Birth control/protection: Condom     medication documentation: reviewed and updated with patient and   Current Outpatient Medications:     amoxicillin (AMOXIL) 500 MG tablet,  TAKE 2 TABLETS BY MOUTH ONCE DAILY FOR 10 DAYS, Disp: , Rfl:     benzonatate (TESSALON) 200 MG capsule, Take 1 capsule by mouth 3 times a day., Disp: , Rfl:     brompheniramine-pseudoephedrine-DM 30-2-10 MG/5ML syrup, TAKE 10 MLS BY MOUTH EVERY 4 TO 6 HOURS AS NEEDED FOR 5 DAYS, Disp: , Rfl:     buPROPion XL (Wellbutrin XL) 300 MG 24 hr tablet, Take 1 tablet by mouth Daily., Disp: 90 tablet, Rfl: 1    cetirizine (ZyrTEC) 10 MG tablet, Take 1 tablet by mouth Daily., Disp: , Rfl:     cholecalciferol (VITAMIN D3) 1000 UNITS tablet, Take by mouth., Disp: , Rfl:     citalopram (CeleXA) 40 MG tablet, Take 1 tablet by mouth Daily., Disp: 90 tablet, Rfl: 1    CRANBERRY PO, Take by mouth., Disp: , Rfl:     fluticasone (FLONASE) 50 MCG/ACT nasal spray, 2 sprays into the nostril(s) as directed by provider Daily., Disp: 16 g, Rfl: 5    Melatonin 5 MG tablet dispersible, Take 2 tablets by mouth., Disp: , Rfl:     Multiple Vitamin (MULTIVITAMIN) capsule, Take by mouth., Disp: , Rfl:     traZODone (DESYREL) 50 MG tablet, TAKE 1 TABLET BY MOUTH ONCE DAILY AT NIGHT, Disp: 90 tablet, Rfl: 0    clarithromycin (BIAXIN) 250 MG tablet, Take 1 tablet by mouth 2 (Two) Times a Day for 10 days., Disp: 20 tablet, Rfl: 0    predniSONE (DELTASONE) 20 MG tablet, Take 1 tablet by mouth Daily for 7 days., Disp: 7 tablet, Rfl: 0  Review of Systems   Constitutional:  Positive for activity change, appetite change, chills, fatigue and fever.   HENT:  Positive for hoarse voice, sore throat, trouble swallowing and voice change.    Respiratory:  Positive for cough and chest tightness. Negative for shortness of breath and wheezing.    Gastrointestinal:  Negative for nausea and vomiting.   Musculoskeletal:  Positive for neck pain.   Neurological:  Positive for headaches. Negative for dizziness.       Objective   Physical Exam  Constitutional:       General: She is not in acute distress.     Appearance: She is ill-appearing.      Comments: Lying in bed with   with her   HENT:      Nose: No congestion.      Right Sinus: No maxillary sinus tenderness.      Left Sinus: No maxillary sinus tenderness.      Mouth/Throat:      Lips: Pink.      Pharynx: Uvula midline. Posterior oropharyngeal erythema present. No pharyngeal swelling or uvula swelling.   Eyes:      Conjunctiva/sclera: Conjunctivae normal.   Lymphadenopathy:      Head:      Right side of head: Tonsillar adenopathy present.      Left side of head: Tonsillar adenopathy present.      Cervical: Cervical adenopathy (patient guided exam) present.   Neurological:      Mental Status: She is alert.         Assessment & Plan   Diagnoses and all orders for this visit:    1. Strep throat (Primary)  -     clarithromycin (BIAXIN) 250 MG tablet; Take 1 tablet by mouth 2 (Two) Times a Day for 10 days.  Dispense: 20 tablet; Refill: 0  -     predniSONE (DELTASONE) 20 MG tablet; Take 1 tablet by mouth Daily for 7 days.  Dispense: 7 tablet; Refill: 0       Patient says that she has not responded to amoxicillin in the past, has had two doses and is worse. Strep positive test results were attached. Alternative antibiotic with steroid for pain will be sent.             Follow Up:  If your symptoms are not resolving by the completion of your treatment or are worsening, see your primary care provider for follow up. If you don't have a primary care provider, you may go to any Urgent Care for re-evaluation. If you develop any life threatening symptoms, go to the nearest Emergency Department immediately or call EMS.               The use of  Video Visit was utilized during this visit, using both [employer] and Visual Pro 360/Epic. The use of a video visit has been reviewed with the patient and verbal informed consent has been obtained. No technical difficulties occurred during the visit.    is located at [address] [place] [address] [place] [address]  Provider is located at [place], [place]

## 2024-04-15 ENCOUNTER — OFFICE VISIT (OUTPATIENT)
Dept: INTERNAL MEDICINE | Facility: CLINIC | Age: 49
End: 2024-04-15
Payer: COMMERCIAL

## 2024-04-15 ENCOUNTER — HOSPITAL ENCOUNTER (OUTPATIENT)
Dept: GENERAL RADIOLOGY | Facility: HOSPITAL | Age: 49
Discharge: HOME | End: 2024-04-15
Admitting: FAMILY MEDICINE
Payer: COMMERCIAL

## 2024-04-15 VITALS
HEIGHT: 69 IN | WEIGHT: 238 LBS | BODY MASS INDEX: 35.25 KG/M2 | HEART RATE: 86 BPM | DIASTOLIC BLOOD PRESSURE: 80 MMHG | OXYGEN SATURATION: 95 % | TEMPERATURE: 97.1 F | SYSTOLIC BLOOD PRESSURE: 124 MMHG

## 2024-04-15 DIAGNOSIS — J06.9 VIRAL UPPER RESPIRATORY TRACT INFECTION: ICD-10-CM

## 2024-04-15 DIAGNOSIS — F41.8 MIXED ANXIETY DEPRESSIVE DISORDER: ICD-10-CM

## 2024-04-15 DIAGNOSIS — G47.09 OTHER INSOMNIA: ICD-10-CM

## 2024-04-15 DIAGNOSIS — R06.83 SNORING: Primary | ICD-10-CM

## 2024-04-15 PROCEDURE — 99214 OFFICE O/P EST MOD 30 MIN: CPT | Performed by: FAMILY MEDICINE

## 2024-04-15 PROCEDURE — 71046 X-RAY EXAM CHEST 2 VIEWS: CPT

## 2024-04-15 RX ORDER — TRAZODONE HYDROCHLORIDE 50 MG/1
50 TABLET ORAL NIGHTLY
Qty: 90 TABLET | Refills: 1 | Status: SHIPPED | OUTPATIENT
Start: 2024-04-15

## 2024-04-15 RX ORDER — BUPROPION HYDROCHLORIDE 300 MG/1
300 TABLET ORAL DAILY
Qty: 30 TABLET | Refills: 1 | Status: SHIPPED | OUTPATIENT
Start: 2024-04-15

## 2024-04-15 RX ORDER — CITALOPRAM 40 MG/1
40 TABLET ORAL DAILY
Qty: 30 TABLET | Refills: 1 | Status: SHIPPED | OUTPATIENT
Start: 2024-04-15

## 2024-04-15 NOTE — PROGRESS NOTES
Subjective   Marcia Shi is a 49 y.o. female.   Chief Complaint   Patient presents with    Depression    Insomnia    Cough       Depression with anxiety-on Celexa 40 mg a day and Wellbutrin  mg a day.  She takes it every day.  She has no side effects.  She noticed problems with concentration in the last year.  Increasing anxiety.  Recently more stress at work, very busy.  Good supportive family.  PHQ-9 at 9 from 3 from 4, NICKY-7 at 8 from 1 from 3. No suicidal ideations.  No aggressive behaviors.    We attempted weaning off citalopram 2 times in the last years. Patient was not able to function.     Insomnia-she takes trazodone 50 mg at bedtime and melatonin.  She gets 9-10 hours of sleep.  No side effects.  No drowsiness.  She snores.    Cough-started 7 to 10 days ago.  Patient tested for COVID at home and it was negative.  She has dry cough.  No shortness of breath but occasional wheezing.  It does not keep her up at night.  She has mild congestion, no sinus pain or pressure.  No sore throat.  No ear pain.     The following portions of the patient's history were reviewed and updated as appropriate: allergies, current medications, past family history, past medical history, past social history, past surgical history, and problem list.    Review of Systems   Constitutional:  Negative for chills and fever.   HENT:  Positive for congestion. Negative for ear pain, sinus pressure, sinus pain and sore throat.    Respiratory:  Positive for cough and wheezing. Negative for shortness of breath.    Psychiatric/Behavioral:  Negative for suicidal ideas. The patient has insomnia.        Objective   Wt Readings from Last 3 Encounters:   04/15/24 108 kg (238 lb)   12/22/23 108 kg (239 lb)   08/28/23 109 kg (239 lb 3.2 oz)      Vitals:    04/15/24 1211   BP: 124/80   Pulse: 86   Temp: 97.1 °F (36.2 °C)   SpO2: 95%     Temp Readings from Last 3 Encounters:   04/15/24 97.1 °F (36.2 °C)   12/22/23 98 °F (36.7 °C)   04/24/23  98.4 °F (36.9 °C)     BP Readings from Last 3 Encounters:   04/15/24 124/80   12/22/23 124/60   08/28/23 134/89     Pulse Readings from Last 3 Encounters:   04/15/24 86   12/22/23 110   04/24/23 88     Body mass index is 35.13 kg/m².    Physical Exam  Constitutional:       Appearance: Normal appearance.   HENT:      Right Ear: Tympanic membrane, ear canal and external ear normal.      Left Ear: Tympanic membrane, ear canal and external ear normal.      Mouth/Throat:      Pharynx: No oropharyngeal exudate or posterior oropharyngeal erythema.   Cardiovascular:      Rate and Rhythm: Normal rate and regular rhythm.      Heart sounds: Normal heart sounds.   Pulmonary:      Effort: Pulmonary effort is normal.      Breath sounds: Normal breath sounds. No wheezing, rhonchi or rales.   Skin:     General: Skin is warm and dry.   Psychiatric:         Behavior: Behavior normal.         Assessment & Plan   Diagnoses and all orders for this visit:    1. Snoring (Primary)  -     Ambulatory Referral to Sleep Medicine    2. Mixed anxiety depressive disorder  -     Ambulatory Referral to Psychiatry    3. Other insomnia    4. Viral upper respiratory tract infection  -     XR Chest PA & Lateral    Other orders  -     buPROPion XL (Wellbutrin XL) 300 MG 24 hr tablet; Take 1 tablet by mouth Daily.  Dispense: 30 tablet; Refill: 1  -     citalopram (CeleXA) 40 MG tablet; Take 1 tablet by mouth Daily.  Dispense: 30 tablet; Refill: 1  -     traZODone (DESYREL) 50 MG tablet; Take 1 tablet by mouth Every Night.  Dispense: 90 tablet; Refill: 1        Depression anxiety-patient is on maximum dose of Wellbutrin and citalopram.  Worsening of anxiety.  I am referring her to psychiatrist.  Will continue current medication short-term so she have time to schedule appointment with psychiatrist.  Referral done.    Insomnia-continue current treatment.  Follow-up in 6 months, or sooner if problems.    Snoring-referral to sleep specialist.     Upper  respiratory infection-chest x-ray, start Mucinex 1200 mg twice a day.  Increase fluids.  Follow-up as needed.

## 2024-05-13 ENCOUNTER — OFFICE VISIT (OUTPATIENT)
Dept: SLEEP MEDICINE | Facility: HOSPITAL | Age: 49
End: 2024-05-13
Payer: COMMERCIAL

## 2024-05-13 VITALS
DIASTOLIC BLOOD PRESSURE: 85 MMHG | SYSTOLIC BLOOD PRESSURE: 125 MMHG | BODY MASS INDEX: 35.1 KG/M2 | WEIGHT: 237 LBS | HEIGHT: 69 IN | HEART RATE: 81 BPM | OXYGEN SATURATION: 96 %

## 2024-05-13 DIAGNOSIS — R06.83 SNORING: Primary | ICD-10-CM

## 2024-05-13 PROCEDURE — G0463 HOSPITAL OUTPT CLINIC VISIT: HCPCS

## 2024-05-13 NOTE — PROGRESS NOTES
CONSULT NOTE    Patient Identification:  Marcia Shi  49 y.o.  female  1975  [id number]            Requesting physician: Iesha Britt MD    Reason for Consultation: Snoring hypersomnia    CC:     History of Present Illness:  Very pleasant 49-year-old female reports having issues with insomnia since .  She is currently taking melatonin and trazodone has been added.  She tells me despite sleeping adequately at night she feels extremely tired in the morning.  Reports snoring but no clear history of witnessed apnea.  Unrested in the morning sleepy tired as the day progresses.  No heavy alcohol use reported.  No parasomnia such as sleepwalking sleep talking or restless leg symptoms reported.  Goes to bed 8:30 PM gets up 5 AM gets about 7+ hours of sleep and still feels tired.  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  No weight gain reported.  Also reports grinding her teeth in her sleep.      Review of Systems  Positive for neck pain anxiety depression fatigue always feeling too warm.  Rest of the 12 point review of system negative.  [place] 5 out of 24 within normal limits  Past Medical History:  Past Medical History:   Diagnosis Date    Ganglion cyst of wrist     UTI (urinary tract infection)     Vitamin D deficiency        Past Surgical History:  Past Surgical History:   Procedure Laterality Date     SECTION      COLONOSCOPY N/A 2021    Procedure: COLONOSCOPY with polypectomy;  Surgeon: Eleuterio Crawford MD;  Location: [facility] MAIN OR;  Service: Gastroenterology;  Laterality: N/A;  polyps, diverticulosis    COLONOSCOPY W/ POLYPECTOMY N/A 2022    Procedure: COLONOSCOPY WITH POLYPECTOMY;  Surgeon: Eleuterio Crawford MD;  Location: [facility] MAIN OR;  Service: Gastroenterology;  Laterality: N/A;  POLYPS X4    NASAL SEPTUM SURGERY      TONSILLECTOMY          Home Meds:  (Not in a hospital admission)      Allergies:  No Known Allergies    Social History:   Social History     Socioeconomic History     "Marital status:    Tobacco Use    Smoking status: Former     Current packs/day: 0.00     Average packs/day: 0.5 packs/day for 10.0 years (5.0 ttl pk-yrs)     Types: Cigarettes     Start date: 1/3/1995     Quit date: 1/3/2005     Years since quittin.3    Smokeless tobacco: Never    Tobacco comments:     quit 15 yrs ago   Vaping Use    Vaping status: Never Used   Substance and Sexual Activity    Alcohol use: Yes     Comment: occa    Drug use: Never    Sexual activity: Yes     Partners: Male     Birth control/protection: Condom       Family History:  Family History   Problem Relation Age of Onset    Hypertension Mother     Cervical cancer Mother 79    Colon cancer Father 70    Lymphoma Father     Breast cancer Neg Hx     Ovarian cancer Neg Hx     Uterine cancer Neg Hx     Deep vein thrombosis Neg Hx     Pulmonary embolism Neg Hx        Physical Exam:  /85   Pulse 81   Ht 175.3 cm (69\")   Wt 108 kg (237 lb)   SpO2 96%   BMI 35.00 kg/m²  Body mass index is 35 kg/m². 96% 108 kg (237 lb)  Physical Exam  Patient is examined using the personal protective equipment as per guidelines from infection control for this particular patient as enacted.  Hand hygiene was performed before and after patient interaction.  Well-developed normal body habitus  Eyes normal conjunctivae and pupils reactive to light  ENT Mallampati between 3 and 4 normal nasal exam  Neck midline trachea no thyromegaly  Chest no labored breathing clear  CVS regular rate and rhythm no lower extremity edema  Abdomen soft nontender no hepatosplenomegaly  CNS intact normal sensory exam  Skin no rashes no nodules  Psych oriented to time place and person normal memory  Musculoskeletal no cyanosis no clubbing normal range of motion        LABS:  Lab Results   Component Value Date    CALCIUM 9.5 2023       No results found for: \"CKTOTAL\", \"CKMB\", \"CKMBINDEX\", \"TROPONINI\", \"TROPONINT\"                              Lab Results   Component " Value Date    TSH 2.650 04/19/2023     CrCl cannot be calculated (Patient's most recent lab result is older than the maximum 30 days allowed.).         Imaging: I personally visualized the images of scans/x-rays performed within last 3 days.      Assessment:  Hypersomnia  Snoring  Insomnia  Allergic rhinitis  Overweight      Recommendations:  At this time we have a middle-aged female with clinical symptoms and airway anatomy strongly suggestive of sleep disordered breathing.  Discussed pathophysiology consequence of untreated sleep apnea.  Patient agreeable wishing to proceed for a home sleep study.  Sleep hygiene measures discussed in detail  Correlation between current symptoms and sleep apnea was discussed in detail  Weight loss encouraged  Treatment of underlying comorbidities  Currently on Flonase and Zyrtec for allergies working well  Will follow-up after home sleep study to make further recommendations              Shailesh Ibrahim MD  5/13/2024  15:02 EDT      Much of this encounter note is an electronic transcription/translation of spoken language to printed text using Dragon Software.

## 2024-05-20 ENCOUNTER — TELEMEDICINE (OUTPATIENT)
Dept: PSYCHIATRY | Facility: CLINIC | Age: 49
End: 2024-05-20
Payer: COMMERCIAL

## 2024-05-20 DIAGNOSIS — F33.0 MILD EPISODE OF RECURRENT MAJOR DEPRESSIVE DISORDER: ICD-10-CM

## 2024-05-20 DIAGNOSIS — G47.9 SLEEP DIFFICULTIES: ICD-10-CM

## 2024-05-20 DIAGNOSIS — F41.1 GENERALIZED ANXIETY DISORDER: Primary | ICD-10-CM

## 2024-05-20 PROCEDURE — 90792 PSYCH DIAG EVAL W/MED SRVCS: CPT | Performed by: NURSE PRACTITIONER

## 2024-05-20 RX ORDER — SERTRALINE HYDROCHLORIDE 25 MG/1
25 TABLET, FILM COATED ORAL DAILY
Qty: 30 TABLET | Refills: 1 | Status: SHIPPED | OUTPATIENT
Start: 2024-05-20 | End: 2024-07-19

## 2024-05-20 RX ORDER — CITALOPRAM 20 MG/1
TABLET ORAL
Qty: 30 TABLET | Refills: 0 | Status: SHIPPED | OUTPATIENT
Start: 2024-05-20

## 2024-05-20 NOTE — PROGRESS NOTES
This provider is located at [facility], [address] [place] [address].The Patient is seen remotely at home, [address] [place] [address] via [employer]. Patient is being seen via telehealth and confirm that they are in a secure environment for this session. The patient's condition being diagnosed/treated is appropriate for telemedicine. The provider identified himself/herself: herself as well as her credentials.   The patient gave consent to be seen remotely, and when consent is given they understand that the consent allows for patient identifiable information to be sent to a third party as needed.   They may refuse to be seen remotely at any time. The electronic data is encrypted and password protected, and the patient has been advised of the potential risks to privacy not withstanding such measures.    You have chosen to receive care through a telehealth visit.  Do you consent to use a video/audio connection for your medical care today? Yes. Patient verified name,  and address.       Subjective   Marcia Shi is a 49 y.o. female who is here today for initial appointment.     Chief Complaint:  anxiety     HPI:  History of Present Illness  Patient presents today after being referred by her PCP Dr. Iesha Britt for anxiety and depression.  Patient notes she has always dealt with some depression since roughly  when [place] came to the [place].  Patient states she tried medication on and off but was not on medication regularly until  and she was placed on citalopram.  Patient feels it is no longer helpful but the Wellbutrin does help some and notes has been on it 2 to 3 years.  Reports recently placed on trazodone and melatonin and states that it does help with sleep but she is getting roughly 7 to 8 hours.  Patient states that she has noticed in the past few months that she has felt down and depressed at times but mostly notes lack of energy and difficulty concentrating.   Patient states anxiety has been worse as she feels more nervous and anxious and on edge.  Patient states this has caused her to have problems concentrating and relaxing which has been made task harder at work.  See PHQ-9 and NICKY-7.  Patient denied feeling hopeless or helpless.  Denies any hypomanic or manic episodes.  Feels the anxiety has been the most difficult issue for her at this time.  Denies any SI/HI/AH/VH.       Past Psych History: Reports PCP placed on Lexapro but can't recall. Notes been on Celexa since  and no longer feels effective. Wellbutrin for 2-3 years and trazodone and melatonin recently for sleep which helps. Denies any self harm, SI or hospitalizations.     Substance Abuse: Denies. Chan Reviewed.     Past Social History: Patient was born and raised in [place].  Reports that she had a good childhood and went on to college but noticed that she had a hard time understanding material.  Reports that she moved to the [place] in  after she met her  in  for a better life.  Reports that she has 1 daughter that is 13 years old.  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  Denies any abuse, legal or  history.    Family History:  family history includes Cervical cancer (age of onset: 79) in her mother; Colon cancer (age of onset: 70) in her father; Dementia in her mother; Depression in her mother; Hypertension in her mother; Lymphoma in her father.    Medical/Surgical History:  Past Medical History:   Diagnosis Date    Anxiety     Chronic pain disorder     Depression     Ganglion cyst of wrist     UTI (urinary tract infection)     Vitamin D deficiency      Past Surgical History:   Procedure Laterality Date     SECTION      COLONOSCOPY N/A 2021    Procedure: COLONOSCOPY with polypectomy;  Surgeon: Eleuterio Crawford MD;  Location: [facility] MAIN OR;  Service:  Gastroenterology;  Laterality: N/A;  polyps, diverticulosis    COLONOSCOPY W/ POLYPECTOMY N/A 12/27/2022    Procedure: COLONOSCOPY WITH POLYPECTOMY;  Surgeon: Eleuterio Crawford MD;  Location: [facility] MAIN OR;  Service: Gastroenterology;  Laterality: N/A;  POLYPS X4    NASAL SEPTUM SURGERY      TONSILLECTOMY         No Known Allergies    Current Medications:   Current Outpatient Medications   Medication Sig Dispense Refill    buPROPion XL (Wellbutrin XL) 300 MG 24 hr tablet Take 1 tablet by mouth Daily. 30 tablet 1    cetirizine (ZyrTEC) 10 MG tablet Take 1 tablet by mouth Daily.      cholecalciferol (VITAMIN D3) 1000 UNITS tablet Take by mouth.      CRANBERRY PO Take by mouth.      fluticasone (FLONASE) 50 MCG/ACT nasal spray 2 sprays into the nostril(s) as directed by provider Daily. 16 g 5    Melatonin 5 MG tablet dispersible Take 2 tablets by mouth.      Multiple Vitamin (MULTIVITAMIN) capsule Take by mouth.      traZODone (DESYREL) 50 MG tablet Take 1 tablet by mouth Every Night. 90 tablet 1    citalopram (CeleXA) 20 MG tablet Take 1 tablet for 2 weeks and then 1/2 tablet for 1 week then stop. 30 tablet 0    sertraline (Zoloft) 25 MG tablet Take 1 tablet by mouth Daily for 60 days. 30 tablet 1     No current facility-administered medications for this visit.       Review of Systems   Psychiatric/Behavioral:  The patient is nervous/anxious.    All other systems reviewed and are negative.      Review of Systems - General ROS: negative for - chills, fever or malaise  Ophthalmic ROS: negative for - loss of vision  ENT ROS: negative for - hearing change  Allergy and Immunology ROS: negative for - hives  Hematological and Lymphatic ROS: negative for - bleeding problems  Endocrine ROS: negative for - skin changes  Respiratory ROS: no cough, shortness of breath, or wheezing  Cardiovascular ROS: no chest pain or dyspnea on exertion  Gastrointestinal ROS: no abdominal pain, change in bowel habits, or black or bloody  stools  Genito-Urinary ROS: no dysuria, trouble voiding, or hematuria  Musculoskeletal ROS: negative for - gait disturbance  Neurological ROS: no TIA or stroke symptoms  Dermatological ROS: negative for rash    Objective   Physical Exam  Nursing note reviewed.   Constitutional:       Appearance: Normal appearance.   Neurological:      Mental Status: She is alert.   Psychiatric:         Attention and Perception: Attention and perception normal.         Mood and Affect: Affect normal. Mood is anxious.         Speech: Speech normal.         Behavior: Behavior is cooperative.         Thought Content: Thought content normal.         Cognition and Memory: Cognition and memory normal.         Judgment: Judgment normal.       not currently breastfeeding. Due to the remote nature of this encounter (virtual encounter), vitals were unable to be obtained.  Height stated at 69 inches.  Weight stated at 237 pounds.      Result Review :     The following data was reviewed by: NICOLE Griffin on 05/20/2024:                    Data reviewed : PCP notes      Mental Status Exam:   Hygiene:   good  Cooperation:  Cooperative  Eye Contact:  Good  Psychomotor Behavior:  Appropriate  Affect:  Appropriate  Hopelessness: Denies  Speech:  Normal  Thought Process:  Goal directed  Thought Content:  Normal  Suicidal:  None  Homicidal:  None  Hallucinations:  None  Delusion:  None  Memory:  Intact  Orientation:  Person, Place, Time, and Situation  Reliability:  good  Insight:  Good and Fair  Judgement:  Good and Fair  Impulse Control:  Good and Fair  Physical/Medical Issues:  Yes see medical hx    PHQ-9 Score:   PHQ-9 Total Score:       PHQ-9 Depression Screening  Little interest or pleasure in doing things?     Feeling down, depressed, or hopeless? (P) 1-->several days   Trouble falling or staying asleep, or sleeping too much? (P) 1-->several days   Feeling tired or having little energy? (P) 3-->nearly every day   Poor appetite or  overeating? (P) 1-->several days   Feeling bad about yourself - or that you are a failure or have let yourself or your family down? (P) 0-->not at all   Trouble concentrating on things, such as reading the newspaper or watching television? (P) 2-->more than half the days   Moving or speaking so slowly that other people could have noticed? Or the opposite - being so fidgety or restless that you have been moving around a lot more than usual? (P) 0-->not at all   Thoughts that you would be better off dead, or of hurting yourself in some way? (P) 0-->not at all   PHQ-9 Total Score     If you checked off any problems, how difficult have these problems made it for you to do your work, take care of things at home, or get along with other people? (P) somewhat difficult      PHQ-9 Total Score:       NICKY-7  Not being able to stop or control worrying: (P) Several days  Worrying too much about different things: (P) Several days  Trouble Relaxing: (P) More than half the days  Being so restless that it is hard to sit still: (P) Not at all  Feeling afraid as if something awful might happen: (P) Several days  Becoming easily annoyed or irritable: (P) More than half the days  NICKY 7 Total Score: (P) 7  If you checked any problems, how difficult have these problems made it for you to do your work, take care of things at home, or get along with other people: (P) Very difficult      Patient screened positive for depression based on a PHQ-9 score of 0 on 4/15/2024. Follow-up recommendations include: Prescribed antidepressant medication treatment and see notes and medication list .        Assessment & Plan   Diagnoses and all orders for this visit:    1. Generalized anxiety disorder (Primary)  -     citalopram (CeleXA) 20 MG tablet; Take 1 tablet for 2 weeks and then 1/2 tablet for 1 week then stop.  Dispense: 30 tablet; Refill: 0  -     sertraline (Zoloft) 25 MG tablet; Take 1 tablet by mouth Daily for 60 days.  Dispense: 30 tablet;  Refill: 1    2. Mild episode of recurrent major depressive disorder  -     citalopram (CeleXA) 20 MG tablet; Take 1 tablet for 2 weeks and then 1/2 tablet for 1 week then stop.  Dispense: 30 tablet; Refill: 0  -     sertraline (Zoloft) 25 MG tablet; Take 1 tablet by mouth Daily for 60 days.  Dispense: 30 tablet; Refill: 1    3. Sleep difficulties        TREATMENT PLAN/GOALS: Continue supportive psychotherapy efforts and medications as indicated. Treatment and medication options discussed during today's visit. Patient ackowledged and verbally consented to continue with current treatment plan and was educated on the importance of compliance with treatment and follow-up appointments.    MEDICATION ISSUES:  We discussed risks, benefits, and side effects of the above medications and the patient was agreeable with the plan. Patient was educated on the importance of compliance with treatment and follow-up appointments.  Patient is agreeable to call the office with any worsening of symptoms or onset of side effects. Patient is agreeable to call 911 or go to the nearest ER should he/she begin having SI/HI.     -Continue Wellbutrin 300 mg XL daily as adjunct for depression and motivation.  -Continue trazodone 50 mg at night and melatonin as needed for sleep.  -Discontinue citalopram with no longer effective.  Encouraged patient to take 20 mg for 2 weeks then 10 mg for 1 week then discontinue.  -Begin Zoloft 25 mg daily now for anxiety.  Highly encouraged patient if she had any worsening symptoms or concerns or SI to contact the clinic and/or go to the nearest ER patient verbalized understanding.     Counseled patient regarding multimodal approach with healthy nutrition, healthy sleep, regular physical activity, social activities, counseling, and medications.      Coping skills reviewed and encouraged positive framing of thoughts     Assisted patient in processing above session content; acknowledged and normalized patient’s  thoughts, feelings, and concerns.  Applied  positive coping skills and behavior management in session.  Allowed patient to freely discuss issues without interruption or judgment. Provided safe, confidential environment to facilitate the development of positive therapeutic relationship and encourage open, honest communication. Assisted patient in identifying risk factors which would indicate the need for higher level of care including thoughts to harm self or others and/or self-harming behavior and encouraged patient to contact this office, call 911, or present to the nearest emergency room should any of these events occur. Discussed crisis intervention services and means to access.       We discussed risks, benefits, and side effects of the above medication and the patient was agreeable with the plan.     Return in about 3 weeks (around 6/10/2024), or if symptoms worsen or fail to improve, for Recheck.         MEDS ORDERED DURING VISIT:  New Medications Ordered This Visit   Medications    citalopram (CeleXA) 20 MG tablet     Sig: Take 1 tablet for 2 weeks and then 1/2 tablet for 1 week then stop.     Dispense:  30 tablet     Refill:  0    sertraline (Zoloft) 25 MG tablet     Sig: Take 1 tablet by mouth Daily for 60 days.     Dispense:  30 tablet     Refill:  1           Follow Up   Return in about 3 weeks (around 6/10/2024), or if symptoms worsen or fail to improve, for Recheck.    Patient was given instructions and counseling regarding her condition or for health maintenance advice. Please see specific information pulled into the AVS if appropriate.       This document has been electronically signed by NICOLE Griffin  May 20, 2024 12:21 EDT    Part of this note may be an electronic transcription/translation of spoken language to printed text using the Dragon Dictation System.

## 2024-05-21 DIAGNOSIS — Z00.00 ANNUAL PHYSICAL EXAM: ICD-10-CM

## 2024-05-21 DIAGNOSIS — E55.9 VITAMIN D DEFICIENCY: Primary | ICD-10-CM

## 2024-05-24 LAB
25(OH)D3+25(OH)D2 SERPL-MCNC: 31.9 NG/ML (ref 30–100)
ALBUMIN SERPL-MCNC: 4.2 G/DL (ref 3.5–5.2)
ALBUMIN/GLOB SERPL: 2.1 G/DL
ALP SERPL-CCNC: 60 U/L (ref 39–117)
ALT SERPL-CCNC: 24 U/L (ref 1–33)
AST SERPL-CCNC: 21 U/L (ref 1–32)
BILIRUB SERPL-MCNC: 0.5 MG/DL (ref 0–1.2)
BUN SERPL-MCNC: 11 MG/DL (ref 6–20)
BUN/CREAT SERPL: 13.4 (ref 7–25)
CALCIUM SERPL-MCNC: 9.1 MG/DL (ref 8.6–10.5)
CHLORIDE SERPL-SCNC: 102 MMOL/L (ref 98–107)
CHOLEST SERPL-MCNC: 214 MG/DL (ref 0–200)
CO2 SERPL-SCNC: 23.3 MMOL/L (ref 22–29)
CREAT SERPL-MCNC: 0.82 MG/DL (ref 0.57–1)
EGFRCR SERPLBLD CKD-EPI 2021: 87.3 ML/MIN/1.73
ERYTHROCYTE [DISTWIDTH] IN BLOOD BY AUTOMATED COUNT: 13 % (ref 12.3–15.4)
GLOBULIN SER CALC-MCNC: 2 GM/DL
GLUCOSE SERPL-MCNC: 97 MG/DL (ref 65–99)
HCT VFR BLD AUTO: 37.9 % (ref 34–46.6)
HDLC SERPL-MCNC: 59 MG/DL (ref 40–60)
HGB BLD-MCNC: 12.3 G/DL (ref 12–15.9)
LDLC SERPL CALC-MCNC: 137 MG/DL (ref 0–100)
LDLC/HDLC SERPL: 2.28 {RATIO}
MCH RBC QN AUTO: 29.2 PG (ref 26.6–33)
MCHC RBC AUTO-ENTMCNC: 32.5 G/DL (ref 31.5–35.7)
MCV RBC AUTO: 90 FL (ref 79–97)
PLATELET # BLD AUTO: 323 10*3/MM3 (ref 140–450)
POTASSIUM SERPL-SCNC: 4.4 MMOL/L (ref 3.5–5.2)
PROT SERPL-MCNC: 6.2 G/DL (ref 6–8.5)
RBC # BLD AUTO: 4.21 10*6/MM3 (ref 3.77–5.28)
SODIUM SERPL-SCNC: 137 MMOL/L (ref 136–145)
TRIGL SERPL-MCNC: 103 MG/DL (ref 0–150)
TSH SERPL DL<=0.005 MIU/L-ACNC: 2.68 UIU/ML (ref 0.45–4.5)
VLDLC SERPL CALC-MCNC: 18 MG/DL (ref 5–40)
WBC # BLD AUTO: 6.57 10*3/MM3 (ref 3.4–10.8)

## 2024-05-31 ENCOUNTER — OFFICE VISIT (OUTPATIENT)
Dept: INTERNAL MEDICINE | Facility: CLINIC | Age: 49
End: 2024-05-31
Payer: COMMERCIAL

## 2024-05-31 VITALS
OXYGEN SATURATION: 93 % | HEIGHT: 69 IN | TEMPERATURE: 97.3 F | SYSTOLIC BLOOD PRESSURE: 126 MMHG | BODY MASS INDEX: 34.96 KG/M2 | WEIGHT: 236 LBS | HEART RATE: 81 BPM | DIASTOLIC BLOOD PRESSURE: 70 MMHG

## 2024-05-31 DIAGNOSIS — R73.9 HYPERGLYCEMIA: ICD-10-CM

## 2024-05-31 DIAGNOSIS — Z00.00 ANNUAL PHYSICAL EXAM: Primary | ICD-10-CM

## 2024-05-31 PROCEDURE — 99396 PREV VISIT EST AGE 40-64: CPT | Performed by: FAMILY MEDICINE

## 2024-05-31 NOTE — PROGRESS NOTES
Subjective   Marcia Shi is a 49 y.o. female.   Chief Complaint   Patient presents with    Annual Exam       History of Present Illness     CPE-patient is here today for a complete physical exam.  She has no complaints.    At last office visit we referred patient to psychiatrist.   They decrease citalopram to 20 mg a day and patient is in the process of weaning it off.  They added sertraline 10 mg a day.  No change in symptoms so far.    At last office visit we referred patient to sleep specialist.  She is scheduled for a home sleep study next week.    Otherwise there is no change  in medical, surgical or family history from last office visit.  No change in over-the-counter medications.  She is not allergic to any medications.  She does not use tobacco products, no alcohol, no drugs.  She exercises every day.  She walks her dog for about 30-40 minutes.  More steps at work.  Dental appointments every 6 months, yearly eye exams.  She was evaluated by GYN in September 2023.  She is up-to-date with Pap smear and mammogram.  She had colonoscopy in December 2022.  Polyps.  Next was recommended 3 years later.  She had COVID-vaccine x3, tetanus vaccine in November 2020.  FBS 97 from 101.    Review of Systems   Constitutional:  Negative for fever.   Respiratory:  Negative for shortness of breath.    Cardiovascular:  Negative for chest pain.   Gastrointestinal:  Negative for blood in stool.   Genitourinary:  Negative for hematuria.   Neurological:  Negative for light-headedness.   Psychiatric/Behavioral:  Negative for suicidal ideas.          Objective   Wt Readings from Last 3 Encounters:   05/31/24 107 kg (236 lb)   05/13/24 108 kg (237 lb)   04/15/24 108 kg (238 lb)      Vitals:    05/31/24 1139   BP: 126/70   Pulse: 81   Temp: 97.3 °F (36.3 °C)   SpO2: 93%     Temp Readings from Last 3 Encounters:   05/31/24 97.3 °F (36.3 °C)   04/15/24 97.1 °F (36.2 °C)   12/22/23 98 °F (36.7 °C)     BP Readings from Last 3  Encounters:   05/31/24 126/70   05/13/24 125/85   04/15/24 124/80     Pulse Readings from Last 3 Encounters:   05/31/24 81   05/13/24 81   04/15/24 86     Body mass index is 34.83 kg/m².    Physical Exam  Vitals reviewed.   Constitutional:       General: She is not in acute distress.     Appearance: She is well-developed. She is not diaphoretic.   HENT:      Head: Normocephalic and atraumatic. Hair is normal.      Right Ear: Hearing, tympanic membrane, ear canal and external ear normal. No decreased hearing noted. No drainage.      Left Ear: Hearing, tympanic membrane, ear canal and external ear normal. No decreased hearing noted.      Nose: No nasal deformity.   Eyes:      General: Lids are normal.         Right eye: No discharge.         Left eye: No discharge.      Conjunctiva/sclera: Conjunctivae normal.      Pupils: Pupils are equal, round, and reactive to light.   Neck:      Thyroid: No thyromegaly.      Vascular: No JVD.      Trachea: No tracheal deviation.   Cardiovascular:      Rate and Rhythm: Normal rate and regular rhythm.      Pulses: Normal pulses.      Heart sounds: Normal heart sounds. No murmur heard.     No friction rub. No gallop.   Pulmonary:      Effort: Pulmonary effort is normal. No respiratory distress.      Breath sounds: Normal breath sounds. No wheezing or rales.   Chest:      Chest wall: No tenderness.   Abdominal:      General: There is no distension.      Palpations: Abdomen is soft. There is no mass.      Tenderness: There is no abdominal tenderness. There is no guarding or rebound.      Hernia: No hernia is present.   Musculoskeletal:         General: No tenderness or deformity. Normal range of motion.      Cervical back: Normal range of motion.   Lymphadenopathy:      Cervical: No cervical adenopathy.      Upper Body:      Right upper body: No supraclavicular adenopathy.      Left upper body: No supraclavicular adenopathy.   Skin:     General: Skin is warm and dry.      Findings: No  erythema or rash.   Neurological:      Mental Status: She is alert and oriented to person, place, and time.      Cranial Nerves: No cranial nerve deficit.      Motor: No abnormal muscle tone.      Coordination: Coordination normal.      Deep Tendon Reflexes: Reflexes are normal and symmetric. Reflexes normal.   Psychiatric:         Behavior: Behavior normal.         Thought Content: Thought content normal.         Judgment: Judgment normal.         Assessment & Plan   Diagnoses and all orders for this visit:    1. Annual physical exam (Primary)    2. Hyperglycemia  -     Basic Metabolic Panel; Future  -     Hemoglobin A1c; Future        CPE-preventive counseling provided.  Blood work results reviewed with patient.  She is up-to-date with cancer screening.  We discussed recommendations for vaccinations including COVID-vaccine and Shingrix vaccine.  Continue regular dental appointments at least every 6 months.  Exercise at least 30 min a day, 5 days a week.  Sun protection recommended.  Hyperglycemia-check BMP and A1c in 6 months.  Limit carbs and sweets.

## 2024-06-03 ENCOUNTER — HOSPITAL ENCOUNTER (OUTPATIENT)
Dept: SLEEP MEDICINE | Facility: HOSPITAL | Age: 49
End: 2024-06-03
Payer: COMMERCIAL

## 2024-06-03 DIAGNOSIS — R06.83 SNORING: ICD-10-CM

## 2024-06-03 PROCEDURE — 95806 SLEEP STUDY UNATT&RESP EFFT: CPT

## 2024-06-10 ENCOUNTER — TELEMEDICINE (OUTPATIENT)
Dept: PSYCHIATRY | Facility: CLINIC | Age: 49
End: 2024-06-10
Payer: COMMERCIAL

## 2024-06-10 DIAGNOSIS — G47.9 SLEEP DIFFICULTIES: ICD-10-CM

## 2024-06-10 DIAGNOSIS — F33.0 MILD EPISODE OF RECURRENT MAJOR DEPRESSIVE DISORDER: Chronic | ICD-10-CM

## 2024-06-10 DIAGNOSIS — F41.1 GENERALIZED ANXIETY DISORDER: Primary | Chronic | ICD-10-CM

## 2024-06-10 PROCEDURE — 99214 OFFICE O/P EST MOD 30 MIN: CPT | Performed by: NURSE PRACTITIONER

## 2024-06-10 RX ORDER — BUPROPION HYDROCHLORIDE 300 MG/1
300 TABLET ORAL DAILY
Qty: 30 TABLET | Refills: 1 | Status: SHIPPED | OUTPATIENT
Start: 2024-06-10

## 2024-06-10 NOTE — PROGRESS NOTES
This provider is located at [facility], [address] [place] [address].The Patient is seen remotely at home, [address] [facility] [facility] [place] [address] via [employer]. Patient is being seen via telehealth and confirm that they are in a secure environment for this session. The patient's condition being diagnosed/treated is appropriate for telemedicine. The provider identified himself/herself: herself as well as her credentials.   The patient gave consent to be seen remotely, and when consent is given they understand that the consent allows for patient identifiable information to be sent to a third party as needed.   They may refuse to be seen remotely at any time. The electronic data is encrypted and password protected, and the patient has been advised of the potential risks to privacy not withstanding such measures.    You have chosen to receive care through a telehealth visit.  Do you consent to use a video/audio connection for your medical care today? Yes. Patient verified Name, , and address.       Chief Complaint  Depression and anxiety     Subjective          Marcia A Damarisdelmi presents to [facility] for medication management.     History of Present Illness  Patient presents today reporting that things are the same. She states that she hasn't had any side effects with the transition or worsening symptoms but hasn't noticed any improvement either. Patient denies feeling hopeless or helpless but states she has felt down and depressed at times. Reports appetite and sleep are the same. She states with trazodone and melatonin she goes to sleep and may wake up 1-2 times in the middle of the night. Reports she had her sleep study last week. Reports she still feels irritable and anxious and on edge at times. She states at work she is taking longer to do things which is not like herself which makes her anxious and harder to concentrate. Reports she goes on  vacation tomorrow for 2 weeks and is hoping that will help. See PHQ-9 and NICKY-7. Denies any side effects to medications. Denies any SI/HI/AH/VH.     Objective   Vital Signs:   There were no vitals taken for this visit.  Due to the remote nature of this encounter (virtual encounter), vitals were unable to be obtained.  Height stated at 69 inches.  Weight stated at 236 pounds.      PHQ-9 Score:   PHQ-9 Total Score:       PHQ-9 Depression Screening  Little interest or pleasure in doing things? (P) 2-->more than half the days   Feeling down, depressed, or hopeless? (P) 1-->several days   Trouble falling or staying asleep, or sleeping too much? (P) 0-->not at all   Feeling tired or having little energy? (P) 1-->several days   Poor appetite or overeating? (P) 0-->not at all   Feeling bad about yourself - or that you are a failure or have let yourself or your family down? (P) 0-->not at all   Trouble concentrating on things, such as reading the newspaper or watching television? (P) 3-->nearly every day   Moving or speaking so slowly that other people could have noticed? Or the opposite - being so fidgety or restless that you have been moving around a lot more than usual? (P) 0-->not at all   Thoughts that you would be better off dead, or of hurting yourself in some way? (P) 0-->not at all   PHQ-9 Total Score (P) 7   If you checked off any problems, how difficult have these problems made it for you to do your work, take care of things at home, or get along with other people? (P) somewhat difficult      PHQ-9 Total Score: (P) 7    NICKY-7  Feeling nervous, anxious or on edge: (P) Several days  Not being able to stop or control worrying: (P) Several days  Worrying too much about different things: (P) Several days  Trouble Relaxing: (P) More than half the days  Being so restless that it is hard to sit still: (P) Not at all  Feeling afraid as if something awful might happen: (P) Several days  Becoming easily annoyed or irritable:  (P) More than half the days  NICKY 7 Total Score: (P) 8  If you checked any problems, how difficult have these problems made it for you to do your work, take care of things at home, or get along with other people: (P) Somewhat difficult      Patient screened positive for depression based on a PHQ-9 score of 7 on 6/10/2024. Follow-up recommendations include: Prescribed antidepressant medication treatment.        Mental Status Exam:   Hygiene:   good  Cooperation:  Cooperative  Eye Contact:  Good  Psychomotor Behavior:  Appropriate  Affect:  Appropriate  Mood: depressed and anxious  Speech:  Normal  Thought Process:  Goal directed  Thought Content:  Normal  Suicidal:  None  Homicidal:  None  Hallucinations:  None  Delusion:  None  Memory:  Intact  Orientation:  Person, Place, Time, and Situation  Reliability:  good  Insight:  Good and Fair  Judgement:  Good and Fair  Impulse Control:  Good and Fair  Physical/Medical Issues:  Yes see hx      Current Medications:   Current Outpatient Medications   Medication Sig Dispense Refill    buPROPion XL (Wellbutrin XL) 300 MG 24 hr tablet Take 1 tablet by mouth Daily. 30 tablet 1    sertraline (Zoloft) 50 MG tablet Take 1 tablet for 2 weeks if no side effects or worsening symptoms then take 2 tablets daily. 45 tablet 1    cetirizine (ZyrTEC) 10 MG tablet Take 1 tablet by mouth Daily.      cholecalciferol (VITAMIN D3) 1000 UNITS tablet Take by mouth.      CRANBERRY PO Take by mouth.      fluticasone (FLONASE) 50 MCG/ACT nasal spray 2 sprays into the nostril(s) as directed by provider Daily. 16 g 5    Melatonin 5 MG tablet dispersible Take 2 tablets by mouth.      Multiple Vitamin (MULTIVITAMIN) capsule Take by mouth.      traZODone (DESYREL) 50 MG tablet Take 1 tablet by mouth Every Night. 90 tablet 1     No current facility-administered medications for this visit.       Physical Exam  Nursing note reviewed.   Constitutional:       Appearance: Normal appearance.   Neurological:       Mental Status: She is alert.   Psychiatric:         Attention and Perception: Attention and perception normal.         Mood and Affect: Mood is anxious and depressed.         Speech: Speech normal.         Behavior: Behavior is cooperative.         Thought Content: Thought content normal.         Cognition and Memory: Cognition and memory normal.         Judgment: Judgment normal.        Result Review :     The following data was reviewed by: NICOLE Griffin on 06/10/2024:  Common labs          5/23/2024    08:16   Common Labs   Glucose 97    BUN 11    Creatinine 0.82    Sodium 137    Potassium 4.4    Chloride 102    Calcium 9.1    Total Protein 6.2    Albumin 4.2    Total Bilirubin 0.5    Alkaline Phosphatase 60    AST (SGOT) 21    ALT (SGPT) 24    WBC 6.57    Hemoglobin 12.3    Hematocrit 37.9    Platelets 323    Total Cholesterol 214    Triglycerides 103    HDL Cholesterol 59    LDL Cholesterol  137      CMP          5/23/2024    08:16   CMP   Glucose 97    BUN 11    Creatinine 0.82    Sodium 137    Potassium 4.4    Chloride 102    Calcium 9.1    Total Protein 6.2    Albumin 4.2    Globulin 2.0    Total Bilirubin 0.5    Alkaline Phosphatase 60    AST (SGOT) 21    ALT (SGPT) 24    BUN/Creatinine Ratio 13.4      CBC          5/23/2024    08:16   CBC   WBC 6.57    RBC 4.21    Hemoglobin 12.3    Hematocrit 37.9    MCV 90.0    MCH 29.2    MCHC 32.5    RDW 13.0    Platelets 323      CBC w/diff          5/23/2024    08:16   CBC w/Diff   WBC 6.57    RBC 4.21    Hemoglobin 12.3    Hematocrit 37.9    MCV 90.0    MCH 29.2    MCHC 32.5    RDW 13.0    Platelets 323      Lipid Panel          5/23/2024    08:16   Lipid Panel   Total Cholesterol 214    Triglycerides 103    HDL Cholesterol 59    VLDL Cholesterol 18    LDL Cholesterol  137    LDL/HDL Ratio 2.28      TSH          5/23/2024    08:16   TSH   TSH 2.680      Electrolytes          5/23/2024    08:16   Electrolytes   Sodium 137    Potassium 4.4    Chloride 102     Calcium 9.1          Data reviewed : PCP notes          Assessment and Plan   Problem List Items Addressed This Visit    None  Visit Diagnoses       Generalized anxiety disorder  (Chronic)   -  Primary    Relevant Medications    sertraline (Zoloft) 50 MG tablet    buPROPion XL (Wellbutrin XL) 300 MG 24 hr tablet    Mild episode of recurrent major depressive disorder  (Chronic)       Relevant Medications    sertraline (Zoloft) 50 MG tablet    buPROPion XL (Wellbutrin XL) 300 MG 24 hr tablet    Sleep difficulties                  TREATMENT PLAN/GOALS: Continue supportive psychotherapy efforts and medications as indicated. Treatment and medication options discussed during today's visit. Patient ackowledged and verbally consented to continue with current treatment plan and was educated on the importance of compliance with treatment and follow-up appointments.    MEDICATION ISSUES:  We discussed risks, benefits, and side effects of the above medications and the patient was agreeable with the plan. Patient was educated on the importance of compliance with treatment and follow-up appointments.  Patient is agreeable to call the office with any worsening of symptoms or onset of side effects. Patient is agreeable to call 911 or go to the nearest ER should he/she begin having SI/HI.     -Continue Wellbutrin 300mg XL daily as adjunct for depression.   -Discontinue Celexa.   -Increase Zoloft to 50mg daily for 2 weeks if no side effects or worsening symptoms after 2 weeks being taking 2 tablets totaling 100mg daily for anxiety and depression. Highly encouraged patient any worsening symptoms or concerns to discontinue contact clinic and go to nearest ER patient verbalized understanding.   -Continue Trazodone 50mg nightly for sleep.        Counseled patient regarding multimodal approach with healthy nutrition, healthy sleep, regular physical activity, social activities, counseling, and medications.      Coping skills reviewed and  encouraged positive framing of thoughts     Assisted patient in processing above session content; acknowledged and normalized patient’s thoughts, feelings, and concerns.  Applied  positive coping skills and behavior management in session.  Allowed patient to freely discuss issues without interruption or judgment. Provided safe, confidential environment to facilitate the development of positive therapeutic relationship and encourage open, honest communication. Assisted patient in identifying risk factors which would indicate the need for higher level of care including thoughts to harm self or others and/or self-harming behavior and encouraged patient to contact this office, call 911, or present to the nearest emergency room should any of these events occur. Discussed crisis intervention services and means to access.     MEDS ORDERED DURING VISIT:  New Medications Ordered This Visit   Medications    sertraline (Zoloft) 50 MG tablet     Sig: Take 1 tablet for 2 weeks if no side effects or worsening symptoms then take 2 tablets daily.     Dispense:  45 tablet     Refill:  1    buPROPion XL (Wellbutrin XL) 300 MG 24 hr tablet     Sig: Take 1 tablet by mouth Daily.     Dispense:  30 tablet     Refill:  1           Follow Up   Return in about 4 weeks (around 7/8/2024), or if symptoms worsen or fail to improve, for Recheck.    Patient was given instructions and counseling regarding her condition or for health maintenance advice. Please see specific information pulled into the AVS if appropriate.     Some of the data in this electronic note has been brought forward from a previous encounter, any necessary changes have been made, it has been reviewed by this APRN, and it is accurate.      This document has been electronically signed by NICOLE Griffin  Lizbeth 10, 2024 11:31 EDT    Part of this note may be an electronic transcription/translation of spoken language to printed text using the Dragon Dictation System.

## 2024-06-24 ENCOUNTER — APPOINTMENT (OUTPATIENT)
Dept: WOMENS IMAGING | Facility: HOSPITAL | Age: 49
End: 2024-06-24
Payer: COMMERCIAL

## 2024-06-24 ENCOUNTER — TELEPHONE (OUTPATIENT)
Dept: SLEEP MEDICINE | Facility: HOSPITAL | Age: 49
End: 2024-06-24
Payer: COMMERCIAL

## 2024-06-24 PROCEDURE — 77063 BREAST TOMOSYNTHESIS BI: CPT | Performed by: RADIOLOGY

## 2024-06-24 PROCEDURE — 77067 SCR MAMMO BI INCL CAD: CPT | Performed by: RADIOLOGY

## 2024-07-15 ENCOUNTER — OFFICE VISIT (OUTPATIENT)
Dept: SLEEP MEDICINE | Facility: HOSPITAL | Age: 49
End: 2024-07-15
Payer: COMMERCIAL

## 2024-07-15 VITALS
BODY MASS INDEX: 35.01 KG/M2 | HEIGHT: 69 IN | OXYGEN SATURATION: 99 % | WEIGHT: 236.4 LBS | DIASTOLIC BLOOD PRESSURE: 78 MMHG | SYSTOLIC BLOOD PRESSURE: 113 MMHG | HEART RATE: 87 BPM

## 2024-07-15 DIAGNOSIS — G47.33 OSA (OBSTRUCTIVE SLEEP APNEA): Primary | ICD-10-CM

## 2024-07-15 PROCEDURE — G0463 HOSPITAL OUTPT CLINIC VISIT: HCPCS

## 2024-07-15 NOTE — PROGRESS NOTES
"[facility]         Dr Shailesh Ibrahim  [unfilled]  Patient Care Team:  Iesha Britt MD as PCP - General  Tracie Weems APRN as Nurse Practitioner (Psychiatry)    Chief Complaint:Essentially home sleep study consistent with mild MARYCARMEN AHI 5 events per hour  AHI worse in supine positioning  Minimal sleep-related hypoxemia  Snoring noted throughout the study    Interval History: Patient here to discuss sleep study result and further management options.  Continues to have issues with insomnia and fatigue during the daytime.  Goes to bed 930 gets up 5 gets about 7+ hours of sleep and still feels tired.  No tobacco no alcohol or caffeine abuse.  [place] 1 out of 24 within normal limits.  Review of system possible anxiety and depression.    REVIEW OF SYSTEMS:   CARDIOVASCULAR: No chest pain, chest pressure or chest discomfort. No palpitations or edema.   RESPIRATORY: No shortness of breath, cough or sputum.   GASTROINTESTINAL: No anorexia, nausea, vomiting or diarrhea. No abdominal pain or blood.   HEMATOLOGIC: No bleeding or bruising.     Ventilator/Non-Invasive Ventilation Settings (From admission, onward)      None              Vital Signs  Heart Rate:  [87] 87  BP: (113)/(78) 113/78  [unfilled]  Flowsheet Rows      Flowsheet Row First Filed Value   Admission Height 175.3 cm (69.02\") Documented at 07/15/2024 1256   Admission Weight 107 kg (236 lb 6.4 oz) Documented at 07/15/2024 1256            Physical Exam:  Patient is examined using the personal protective equipment as per guidelines from infection control for this particular patient as enacted.  Hand hygiene was performed before and after patient interaction.   General Appearance:    Alert, cooperative, in no acute distress.  Following simple commands  ENT Mallampati between 3 and 4 no nasal congestion  Neck midline trachea, no thyromegaly   Lungs:     Clear to auscultation, respirations regular, even and                  unlabored    " Heart:    Regular rhythm and normal rate, normal S1 and S2, no            murmur, no gallop, no rub, no click   Chest Wall:    No abnormalities observed   Abdomen:     Normal bowel sounds, no masses, no organomegaly, soft        nontender, nondistended, no guarding, no rebound                tenderness   Extremities:   Moves all extremities well, no edema, no cyanosis, no             redness  CNS no focal neurological deficits normal sensory exam  Skin no rashes no nodules  Musculoskeletal no cyanosis no clubbing normal range of motion     Results Review:                                          I reviewed the patient's new clinical results.  I personally viewed and interpreted the patient's chest x-ray.        Medication Review:       No current facility-administered medications for this visit.      ASSESSMENT:   Mild MARYCARMEN  Hypersomnia  Snoring  Insomnia  Allergic rhinitis  Overweight      PLAN:  Detailed discussion with the patient.  Mild MARYCARMEN with clinical symptoms of hypersomnia and snoring and insomnia  Different treatment options were discussed in detail  Patient agreeable to proceed with treatment of auto CPAP  Set up auto CPAP 5 to 15 cm with heated timidity and ramp  Treatment of underlying comorbidities  Treatment of allergies  Weight loss encouraged  Follow-up in 8 to 10 weeks after set up      Shailesh Ibrahim MD  07/15/24  13:06 EDT

## 2024-07-16 ENCOUNTER — TELEPHONE (OUTPATIENT)
Dept: SLEEP MEDICINE | Facility: HOSPITAL | Age: 49
End: 2024-07-16
Payer: COMMERCIAL

## 2024-08-05 ENCOUNTER — TELEMEDICINE (OUTPATIENT)
Dept: PSYCHIATRY | Facility: CLINIC | Age: 49
End: 2024-08-05
Payer: COMMERCIAL

## 2024-08-05 DIAGNOSIS — G47.9 SLEEP DIFFICULTIES: ICD-10-CM

## 2024-08-05 DIAGNOSIS — F33.0 MILD EPISODE OF RECURRENT MAJOR DEPRESSIVE DISORDER: Chronic | ICD-10-CM

## 2024-08-05 DIAGNOSIS — F41.1 GENERALIZED ANXIETY DISORDER: Primary | ICD-10-CM

## 2024-08-05 PROCEDURE — 99214 OFFICE O/P EST MOD 30 MIN: CPT | Performed by: NURSE PRACTITIONER

## 2024-08-05 RX ORDER — BUPROPION HYDROCHLORIDE 300 MG/1
300 TABLET ORAL DAILY
Qty: 30 TABLET | Refills: 1 | Status: SHIPPED | OUTPATIENT
Start: 2024-08-05

## 2024-08-05 RX ORDER — BUSPIRONE HYDROCHLORIDE 5 MG/1
5 TABLET ORAL 3 TIMES DAILY
Qty: 90 TABLET | Refills: 1 | Status: SHIPPED | OUTPATIENT
Start: 2024-08-05

## 2024-08-05 NOTE — PROGRESS NOTES
This provider is located at [facility], [address] [place] [place] [address].The Patient is seen remotely at home, [address] [facility] [place] [address] via [employer]. Patient is being seen via telehealth and confirm that they are in a secure environment for this session. The patient's condition being diagnosed/treated is appropriate for telemedicine. The provider identified himself/herself: herself as well as her credentials.   The patient gave consent to be seen remotely, and when consent is given they understand that the consent allows for patient identifiable information to be sent to a third party as needed.   They may refuse to be seen remotely at any time. The electronic data is encrypted and password protected, and the patient has been advised of the potential risks to privacy not withstanding such measures.    You have chosen to receive care through a telehealth visit.  Do you consent to use a video/audio connection for your medical care today? Yes. Patient verified Name, , and address.       Chief Complaint  Depression and anxiety     Subjective    Marcia Shi presents to [facility] for medication management.     History of Present Illness  Patient presents today reporting that she has not noticed any difference with the medication.  Patient notes that she is still feeling anxious and worried and on edge as well as irritable and agitated and forgetful.  Reports that she still has a hard time concentrating at work at times.  Denies any depressive symptoms or feeling hopeless or helpless.  Reports that the trazodone is helpful for her sleep and her appetite is good.  Denies any side effects to medications.  Patient also notes that it could be premenopausal and is seeing her OB/GYN in September to discuss this as well as obtain labs.  Denies any SI/HI/AH/VH.    Objective   Vital Signs:   There were no vitals taken for this visit.  Due to the  remote nature of this encounter (virtual encounter), vitals were unable to be obtained.  Height stated at 69 inches.  Weight stated at 236 pounds.      PHQ-9 Score:   PHQ-9 Total Score:       PHQ-9 Depression Screening  Little interest or pleasure in doing things? (P) 1-->several days   Feeling down, depressed, or hopeless? (P) 0-->not at all   Trouble falling or staying asleep, or sleeping too much? (P) 0-->not at all   Feeling tired or having little energy? (P) 2-->more than half the days   Poor appetite or overeating? (P) 0-->not at all   Feeling bad about yourself - or that you are a failure or have let yourself or your family down? (P) 0-->not at all   Trouble concentrating on things, such as reading the newspaper or watching television? (P) 2-->more than half the days   Moving or speaking so slowly that other people could have noticed? Or the opposite - being so fidgety or restless that you have been moving around a lot more than usual? (P) 0-->not at all   Thoughts that you would be better off dead, or of hurting yourself in some way? (P) 0-->not at all   PHQ-9 Total Score (P) 5   If you checked off any problems, how difficult have these problems made it for you to do your work, take care of things at home, or get along with other people? (P) somewhat difficult      PHQ-9 Total Score: (P) 5    NICKY-7  Feeling nervous, anxious or on edge: (P) Several days  Not being able to stop or control worrying: (P) Not at all  Worrying too much about different things: (P) Several days  Trouble Relaxing: (P) More than half the days  Being so restless that it is hard to sit still: (P) Not at all  Feeling afraid as if something awful might happen: (P) Not at all  Becoming easily annoyed or irritable: (P) More than half the days  NICKY 7 Total Score: (P) 6  If you checked any problems, how difficult have these problems made it for you to do your work, take care of things at home, or get along with other people: (P) Somewhat  difficult      Patient screened positive for depression based on a PHQ-9 score of 5 on 8/5/2024. Follow-up recommendations include: Prescribed antidepressant medication treatment.        Mental Status Exam:   Hygiene:   good  Cooperation:  Cooperative  Eye Contact:  Good  Psychomotor Behavior:  Appropriate  Affect:  Appropriate  Mood: anxious and irritable  Speech:  Normal  Thought Process:  Goal directed  Thought Content:  Normal  Suicidal:  None  Homicidal:  None  Hallucinations:  None  Delusion:  None  Memory:  Intact  Orientation:  Person, Place, Time, and Situation  Reliability:  good  Insight:  Good and Fair  Judgement:  Good and Fair  Impulse Control:  Good and Fair  Physical/Medical Issues:  Yes see hx      Current Medications:   Current Outpatient Medications   Medication Sig Dispense Refill    buPROPion XL (Wellbutrin XL) 300 MG 24 hr tablet Take 1 tablet by mouth Daily. 30 tablet 1    busPIRone (BUSPAR) 5 MG tablet Take 1 tablet by mouth 3 (Three) Times a Day. 90 tablet 1    cetirizine (ZyrTEC) 10 MG tablet Take 1 tablet by mouth Daily.      cholecalciferol (VITAMIN D3) 1000 UNITS tablet Take by mouth.      CRANBERRY PO Take by mouth.      fluticasone (FLONASE) 50 MCG/ACT nasal spray 2 sprays into the nostril(s) as directed by provider Daily. 16 g 5    Melatonin 5 MG tablet dispersible Take 2 tablets by mouth.      Multiple Vitamin (MULTIVITAMIN) capsule Take by mouth.      traZODone (DESYREL) 50 MG tablet Take 1 tablet by mouth Every Night. 90 tablet 1     No current facility-administered medications for this visit.       Physical Exam  Nursing note reviewed.   Constitutional:       Appearance: Normal appearance.   Neurological:      Mental Status: She is alert.   Psychiatric:         Attention and Perception: Attention and perception normal.         Mood and Affect: Affect normal. Mood is anxious. Mood is not depressed.         Speech: Speech normal.         Behavior: Behavior is agitated. Behavior is  cooperative.         Thought Content: Thought content normal.         Cognition and Memory: Cognition and memory normal.         Judgment: Judgment normal.        Result Review :     The following data was reviewed by: NICOLE Griffin on 06/10/2024:  Common labs          5/23/2024    08:16   Common Labs   Glucose 97    BUN 11    Creatinine 0.82    Sodium 137    Potassium 4.4    Chloride 102    Calcium 9.1    Total Protein 6.2    Albumin 4.2    Total Bilirubin 0.5    Alkaline Phosphatase 60    AST (SGOT) 21    ALT (SGPT) 24    WBC 6.57    Hemoglobin 12.3    Hematocrit 37.9    Platelets 323    Total Cholesterol 214    Triglycerides 103    HDL Cholesterol 59    LDL Cholesterol  137      CMP          5/23/2024    08:16   CMP   Glucose 97    BUN 11    Creatinine 0.82    Sodium 137    Potassium 4.4    Chloride 102    Calcium 9.1    Total Protein 6.2    Albumin 4.2    Globulin 2.0    Total Bilirubin 0.5    Alkaline Phosphatase 60    AST (SGOT) 21    ALT (SGPT) 24    BUN/Creatinine Ratio 13.4      CBC          5/23/2024    08:16   CBC   WBC 6.57    RBC 4.21    Hemoglobin 12.3    Hematocrit 37.9    MCV 90.0    MCH 29.2    MCHC 32.5    RDW 13.0    Platelets 323      CBC w/diff          5/23/2024    08:16   CBC w/Diff   WBC 6.57    RBC 4.21    Hemoglobin 12.3    Hematocrit 37.9    MCV 90.0    MCH 29.2    MCHC 32.5    RDW 13.0    Platelets 323      Lipid Panel          5/23/2024    08:16   Lipid Panel   Total Cholesterol 214    Triglycerides 103    HDL Cholesterol 59    VLDL Cholesterol 18    LDL Cholesterol  137    LDL/HDL Ratio 2.28      TSH          5/23/2024    08:16   TSH   TSH 2.680      Electrolytes          5/23/2024    08:16   Electrolytes   Sodium 137    Potassium 4.4    Chloride 102    Calcium 9.1          Data reviewed : PCP notes          Assessment and Plan   Problem List Items Addressed This Visit    None  Visit Diagnoses       Generalized anxiety disorder    -  Primary    Relevant Medications    buPROPion  XL (Wellbutrin XL) 300 MG 24 hr tablet    busPIRone (BUSPAR) 5 MG tablet    Mild episode of recurrent major depressive disorder  (Chronic)       Relevant Medications    buPROPion XL (Wellbutrin XL) 300 MG 24 hr tablet    busPIRone (BUSPAR) 5 MG tablet    Sleep difficulties                    TREATMENT PLAN/GOALS: Continue supportive psychotherapy efforts and medications as indicated. Treatment and medication options discussed during today's visit. Patient ackowledged and verbally consented to continue with current treatment plan and was educated on the importance of compliance with treatment and follow-up appointments.    MEDICATION ISSUES:  We discussed risks, benefits, and side effects of the above medications and the patient was agreeable with the plan. Patient was educated on the importance of compliance with treatment and follow-up appointments.  Patient is agreeable to call the office with any worsening of symptoms or onset of side effects. Patient is agreeable to call 911 or go to the nearest ER should he/she begin having SI/HI.     -Continue Wellbutrin 300mg XL daily as adjunct for depression.    -Discontinue Zoloft since ineffective encouraged patient to take 50 mg for 2 weeks then discontinue.  -Begin BuSpar 5 mg 3 times daily for anxiety.  Highly encouraged patient if she had any significant side effects that did not improve her any worsening symptoms or SI to discontinue contact clinic and/or go to the nearest ER patient verbalized understanding.  -Continue Trazodone 50mg nightly for sleep.        Counseled patient regarding multimodal approach with healthy nutrition, healthy sleep, regular physical activity, social activities, counseling, and medications.      Coping skills reviewed and encouraged positive framing of thoughts     Assisted patient in processing above session content; acknowledged and normalized patient’s thoughts, feelings, and concerns.  Applied  positive coping skills and behavior  management in session.  Allowed patient to freely discuss issues without interruption or judgment. Provided safe, confidential environment to facilitate the development of positive therapeutic relationship and encourage open, honest communication. Assisted patient in identifying risk factors which would indicate the need for higher level of care including thoughts to harm self or others and/or self-harming behavior and encouraged patient to contact this office, call 911, or present to the nearest emergency room should any of these events occur. Discussed crisis intervention services and means to access.     MEDS ORDERED DURING VISIT:  New Medications Ordered This Visit   Medications    buPROPion XL (Wellbutrin XL) 300 MG 24 hr tablet     Sig: Take 1 tablet by mouth Daily.     Dispense:  30 tablet     Refill:  1    busPIRone (BUSPAR) 5 MG tablet     Sig: Take 1 tablet by mouth 3 (Three) Times a Day.     Dispense:  90 tablet     Refill:  1           Follow Up   Return in about 6 weeks (around 9/16/2024), or if symptoms worsen or fail to improve, for Recheck.    Patient was given instructions and counseling regarding her condition or for health maintenance advice. Please see specific information pulled into the AVS if appropriate.     Some of the data in this electronic note has been brought forward from a previous encounter, any necessary changes have been made, it has been reviewed by this APRN, and it is accurate.      This document has been electronically signed by NICOLE Griffin  August 5, 2024 10:56 EDT    Part of this note may be an electronic transcription/translation of spoken language to printed text using the Dragon Dictation System.

## 2024-09-10 NOTE — PROGRESS NOTES
GYN ANNUAL EXAM   Chief Complaint   Patient presents with    Annual Exam     AE and last pap  normal,MX .Postmenopausal symptoms.       MIRYAM Kennedy is a 49 y.o. female who presents for annual well woman exam. She is a patient of Dr. Mason.  Over the course of the last 3 years she has noticed an increase in her hot flashes and night sweats. She also notes that her hair is thinning.     OB History          1    Para   1    Term   1            AB        Living   1         SAB        IAB        Ectopic        Molar        Multiple        Live Births   1                SUBJECTIVE    MENSTRUAL & SEXUAL HEALTH  LMP: Patient's last menstrual period was 2024.  Menses regularity: irregular  Menses length: n/a  Dysmenorrhea: n/a  Cyclic symptoms: n/a  Current contraception: none  Last pap: , Normal PAP  History of abnormal pap: no  History of STD: No  Family history of cancer: cervical cancer (mother)       Family history of breast cancer: no  Performs SBE: performs monthly.  Mammogram: , Birads I (Normal).  History of abnormal mammogram: no  Colonoscopy:   Bleeding since LMP: no  Vasomotor symptoms: yes  HRT: no  Incontinence: no  Dyspareunia: no    Past Medical History:   Diagnosis Date    Anxiety     Chronic pain disorder     Depression     Ganglion cyst of wrist     UTI (urinary tract infection)     Vitamin D deficiency        Past Surgical History:   Procedure Laterality Date     SECTION      COLONOSCOPY N/A 2021    Procedure: COLONOSCOPY with polypectomy;  Surgeon: Eleuterio Crawford MD;  [place] [facility] [place]  Service: Gastroenterology;  Laterality: N/A;  polyps, diverticulosis    COLONOSCOPY W/ POLYPECTOMY N/A 2022    Procedure: COLONOSCOPY WITH POLYPECTOMY;  Surgeon: Eleuterio Crawford MD;  [place] [facility] [place];  Service: Gastroenterology;  Laterality: N/A;  POLYPS X4    NASAL SEPTUM SURGERY      TONSILLECTOMY           Current  Outpatient Medications:     buPROPion XL (Wellbutrin XL) 300 MG 24 hr tablet, Take 1 tablet by mouth Daily., Disp: 30 tablet, Rfl: 1    busPIRone (BUSPAR) 5 MG tablet, Take 1 tablet by mouth 3 (Three) Times a Day., Disp: 90 tablet, Rfl: 1    cetirizine (ZyrTEC) 10 MG tablet, Take 1 tablet by mouth Daily., Disp: , Rfl:     cholecalciferol (VITAMIN D3) 1000 UNITS tablet, Take by mouth., Disp: , Rfl:     CRANBERRY PO, Take by mouth., Disp: , Rfl:     fluticasone (FLONASE) 50 MCG/ACT nasal spray, 2 sprays into the nostril(s) as directed by provider Daily., Disp: 16 g, Rfl: 5    Melatonin 5 MG tablet dispersible, Take 2 tablets by mouth., Disp: , Rfl:     Multiple Vitamin (MULTIVITAMIN) capsule, Take by mouth., Disp: , Rfl:     traZODone (DESYREL) 50 MG tablet, Take 1 tablet by mouth Every Night., Disp: 90 tablet, Rfl: 1    No Known Allergies    Social History     Tobacco Use    Smoking status: Former     Current packs/day: 0.00     Average packs/day: 0.5 packs/day for 10.0 years (5.0 ttl pk-yrs)     Types: Cigarettes     Start date: 1/3/1995     Quit date: 1/3/2005     Years since quittin.7    Smokeless tobacco: Never    Tobacco comments:     quit 15 yrs ago   Vaping Use    Vaping status: Never Used   Substance Use Topics    Alcohol use: Yes     Comment: occa    Drug use: Never       Family History   Problem Relation Age of Onset    Hypertension Mother     Cervical cancer Mother 79    Dementia Mother     Depression Mother     Colon cancer Father 70    Lymphoma Father     Breast cancer Neg Hx     Ovarian cancer Neg Hx     Uterine cancer Neg Hx     Deep vein thrombosis Neg Hx     Pulmonary embolism Neg Hx        Review of Systems   Constitutional:  Negative for fatigue, unexpected weight gain and unexpected weight loss.   Gastrointestinal:  Negative for abdominal pain.   Genitourinary:  Negative for decreased libido, difficulty urinating, dyspareunia, dysuria, pelvic pain, pelvic pressure, urgency, urinary  "incontinence and vaginal discharge.   All other systems reviewed and are negative.      OBJECTIVE    /90   Ht 175.3 cm (69.02\")   Wt 107 kg (236 lb)   LMP 06/02/2024   BMI 34.83 kg/m²     Physical Exam  Constitutional:       General: She is awake. She is not in acute distress.     Appearance: Normal appearance. She is well-developed and well-groomed. She is not ill-appearing.   Genitourinary:      Vulva, bladder and urethral meatus normal.      Right Labia: No rash, tenderness, lesions or skin changes.     Left Labia: No tenderness, lesions, skin changes or rash.     No labial fusion noted.      No inguinal adenopathy present in the right or left side.     No vaginal discharge, erythema, tenderness, bleeding, ulceration or granulation tissue.      No vaginal prolapse present.     No vaginal atrophy present.     No cervical discharge, friability, lesion, polyp, eversion or elongation.      Uterus is not enlarged, tender or prolapsed.      Pelvic exam was performed with patient in the lithotomy position.   Breasts:     Breasts are symmetrical.      Breasts are soft.     Right: Normal.      Left: Normal.   HENT:      Head: Normocephalic and atraumatic.   Eyes:      General: No scleral icterus.     Conjunctiva/sclera: Conjunctivae normal.   Cardiovascular:      Rate and Rhythm: Normal rate and regular rhythm.      Heart sounds: Normal heart sounds.   Pulmonary:      Effort: Pulmonary effort is normal.      Breath sounds: Normal breath sounds.   Abdominal:      Palpations: Abdomen is soft.      Hernia: There is no hernia in the left inguinal area or right inguinal area.   Musculoskeletal:         General: Normal range of motion.      Cervical back: Normal range of motion.   Lymphadenopathy:      Lower Body: No right inguinal adenopathy. No left inguinal adenopathy.   Neurological:      Mental Status: She is alert.   Skin:     General: Skin is warm and dry.      Coloration: Skin is not jaundiced or pale. "   Psychiatric:         Behavior: Behavior normal. Behavior is cooperative.   Vitals reviewed.         ASSESSMENT    Diagnoses and all orders for this visit:    1. Encounter for gynecological examination without abnormal finding (Primary)  -     IGP, Apt HPV,rfx 16 / 18,45    2. Hot flashes  -     Antimullerian Hormone (AMH)  -     Estradiol  -     FSH & LH  -     Testosterone  -     Inhibin A, Ultrasensitive  -     Estrogens, Total  -     TSH  -     T4, free  -     Hemoglobin A1c  -     Progesterone  -     CBC & Differential  -     Iron Profile  -     Ferritin  -     Vitamin D,25-Hydroxy    3. Night sweats  -     Antimullerian Hormone (AMH)  -     Estradiol  -     FSH & LH  -     Testosterone  -     Inhibin A, Ultrasensitive  -     Estrogens, Total  -     TSH  -     T4, free  -     Hemoglobin A1c  -     Progesterone  -     CBC & Differential  -     Iron Profile  -     Ferritin  -     Vitamin D,25-Hydroxy    4. Thinning hair  -     TSH  -     T4, free  -     CBC & Differential  -     Iron Profile  -     Ferritin         PLAN   WELL WOMAN EXAM: Pap smear collected today. Recommend MVI daily.    VASOMOTOR SYMPTOMS: Tiki would like to have labs done today to assess for menopause. Education provided on options available to her. She would like to   THINNING HAIR: Labs ordered to assess for anemia or thyroid issues.   SMOKING STATUS: former smoker.  BONE HEALTH: weight bearing exercise, dietary calcium recommendations and vitamin D reviewed.  COLON HEALTH: screening colonoscopy or Cologuard recommended.  BREAST HEALTH: Encouraged annual mammogram screening starting at age 40. Instructed on how to perform SBE. Encouraged breast health self awareness.  EXERCISE: Encouraged 150 minutes of exercise per week if not medially contraindicated.   BMI: Body mass index is 34.83 kg/m².     Return in about 1 year (around 9/11/2025) for annual exam or as needed before.    I spent 30 minutes caring for Marcia on this date of service.  This time includes time spent by me in the following activities: preparing for the visit, reviewing tests, performing a medically appropriate examination and/or evaluation, counseling and educating the patient/family/caregiver, referring and communicating with other health care professionals, documenting information in the medical record, independently interpreting results and communicating that information with the patient/family/caregiver, care coordination, ordering medications, ordering test(s), ordering procedure(s), obtaining a separately obtained history, and reviewing a separately obtained history.    Mee Gaytan CNM  9/11/2024  10:52 EDT

## 2024-09-11 ENCOUNTER — OFFICE VISIT (OUTPATIENT)
Dept: OBSTETRICS AND GYNECOLOGY | Facility: CLINIC | Age: 49
End: 2024-09-11
Payer: COMMERCIAL

## 2024-09-11 VITALS
DIASTOLIC BLOOD PRESSURE: 90 MMHG | WEIGHT: 236 LBS | HEIGHT: 69 IN | BODY MASS INDEX: 34.96 KG/M2 | SYSTOLIC BLOOD PRESSURE: 131 MMHG

## 2024-09-11 DIAGNOSIS — R23.2 HOT FLASHES: ICD-10-CM

## 2024-09-11 DIAGNOSIS — L65.9 THINNING HAIR: ICD-10-CM

## 2024-09-11 DIAGNOSIS — Z01.419 ENCOUNTER FOR GYNECOLOGICAL EXAMINATION WITHOUT ABNORMAL FINDING: Primary | ICD-10-CM

## 2024-09-11 DIAGNOSIS — R61 NIGHT SWEATS: ICD-10-CM

## 2024-09-13 LAB
CYTOLOGIST CVX/VAG CYTO: NORMAL
CYTOLOGY CVX/VAG DOC CYTO: NORMAL
CYTOLOGY CVX/VAG DOC THIN PREP: NORMAL
DX ICD CODE: NORMAL
HPV I/H RISK 4 DNA CVX QL PROBE+SIG AMP: NEGATIVE
Lab: NORMAL
OTHER STN SPEC: NORMAL
STAT OF ADQ CVX/VAG CYTO-IMP: NORMAL

## 2024-09-16 ENCOUNTER — TELEMEDICINE (OUTPATIENT)
Dept: PSYCHIATRY | Facility: CLINIC | Age: 49
End: 2024-09-16
Payer: COMMERCIAL

## 2024-09-16 DIAGNOSIS — F41.1 GENERALIZED ANXIETY DISORDER: Primary | Chronic | ICD-10-CM

## 2024-09-16 DIAGNOSIS — G47.9 SLEEP DIFFICULTIES: ICD-10-CM

## 2024-09-16 DIAGNOSIS — F33.0 MILD EPISODE OF RECURRENT MAJOR DEPRESSIVE DISORDER: Chronic | ICD-10-CM

## 2024-09-16 PROCEDURE — 96127 BRIEF EMOTIONAL/BEHAV ASSMT: CPT | Performed by: NURSE PRACTITIONER

## 2024-09-16 PROCEDURE — 99214 OFFICE O/P EST MOD 30 MIN: CPT | Performed by: NURSE PRACTITIONER

## 2024-09-16 RX ORDER — BUSPIRONE HYDROCHLORIDE 5 MG/1
5 TABLET ORAL 3 TIMES DAILY
Qty: 90 TABLET | Refills: 1 | Status: SHIPPED | OUTPATIENT
Start: 2024-09-16

## 2024-09-16 RX ORDER — TRAZODONE HYDROCHLORIDE 50 MG/1
50 TABLET, FILM COATED ORAL NIGHTLY
Qty: 90 TABLET | Refills: 1 | Status: SHIPPED | OUTPATIENT
Start: 2024-09-16

## 2024-09-16 RX ORDER — BUPROPION HYDROCHLORIDE 300 MG/1
300 TABLET ORAL DAILY
Qty: 90 TABLET | Refills: 0 | Status: SHIPPED | OUTPATIENT
Start: 2024-09-16

## 2024-09-23 ENCOUNTER — OFFICE VISIT (OUTPATIENT)
Dept: INTERNAL MEDICINE | Facility: CLINIC | Age: 49
End: 2024-09-23
Payer: COMMERCIAL

## 2024-09-23 ENCOUNTER — HOSPITAL ENCOUNTER (OUTPATIENT)
Dept: GENERAL RADIOLOGY | Facility: HOSPITAL | Age: 49
Discharge: HOME | End: 2024-09-23
Admitting: FAMILY MEDICINE
Payer: COMMERCIAL

## 2024-09-23 VITALS
BODY MASS INDEX: 35.25 KG/M2 | OXYGEN SATURATION: 97 % | WEIGHT: 238 LBS | DIASTOLIC BLOOD PRESSURE: 70 MMHG | HEIGHT: 69 IN | SYSTOLIC BLOOD PRESSURE: 110 MMHG | HEART RATE: 95 BPM | TEMPERATURE: 97.5 F

## 2024-09-23 DIAGNOSIS — M25.551 PAIN OF RIGHT HIP: Primary | ICD-10-CM

## 2024-09-23 PROCEDURE — 73502 X-RAY EXAM HIP UNI 2-3 VIEWS: CPT

## 2024-09-23 PROCEDURE — 99213 OFFICE O/P EST LOW 20 MIN: CPT | Performed by: FAMILY MEDICINE

## 2024-09-23 RX ORDER — MELOXICAM 15 MG/1
15 TABLET ORAL DAILY
Qty: 21 TABLET | Refills: 0 | Status: SHIPPED | OUTPATIENT
Start: 2024-09-23

## 2024-09-24 LAB
25(OH)D3+25(OH)D2 SERPL-MCNC: 41.1 NG/ML (ref 30–100)
BASOPHILS # BLD AUTO: 0.1 X10E3/UL (ref 0–0.2)
BASOPHILS NFR BLD AUTO: 1 %
EOSINOPHIL # BLD AUTO: 0.1 X10E3/UL (ref 0–0.4)
EOSINOPHIL NFR BLD AUTO: 1 %
ERYTHROCYTE [DISTWIDTH] IN BLOOD BY AUTOMATED COUNT: 13.6 % (ref 11.7–15.4)
ESTRADIOL SERPL-MCNC: 62.5 PG/ML
FERRITIN SERPL-MCNC: 49 NG/ML (ref 15–150)
FSH SERPL-ACNC: 5.5 MIU/ML
HBA1C MFR BLD: 5.9 % (ref 4.8–5.6)
HCT VFR BLD AUTO: 43.4 % (ref 34–46.6)
HGB BLD-MCNC: 13.7 G/DL (ref 11.1–15.9)
IMM GRANULOCYTES # BLD AUTO: 0 X10E3/UL (ref 0–0.1)
IMM GRANULOCYTES NFR BLD AUTO: 1 %
IRON SATN MFR SERPL: 18 % (ref 15–55)
IRON SERPL-MCNC: 69 UG/DL (ref 27–159)
LH SERPL-ACNC: 7.5 MIU/ML
LYMPHOCYTES # BLD AUTO: 1.6 X10E3/UL (ref 0.7–3.1)
LYMPHOCYTES NFR BLD AUTO: 21 %
MCH RBC QN AUTO: 28.3 PG (ref 26.6–33)
MCHC RBC AUTO-ENTMCNC: 31.6 G/DL (ref 31.5–35.7)
MCV RBC AUTO: 90 FL (ref 79–97)
MONOCYTES # BLD AUTO: 0.5 X10E3/UL (ref 0.1–0.9)
MONOCYTES NFR BLD AUTO: 7 %
NEUTROPHILS # BLD AUTO: 5.5 X10E3/UL (ref 1.4–7)
NEUTROPHILS NFR BLD AUTO: 69 %
PLATELET # BLD AUTO: 389 X10E3/UL (ref 150–450)
PROGEST SERPL-MCNC: 15.3 NG/ML
RBC # BLD AUTO: 4.84 X10E6/UL (ref 3.77–5.28)
T4 FREE SERPL-MCNC: 1.08 NG/DL (ref 0.82–1.77)
TESTOST SERPL-MCNC: 16 NG/DL (ref 4–50)
TIBC SERPL-MCNC: 386 UG/DL (ref 250–450)
TSH SERPL DL<=0.005 MIU/L-ACNC: 3.25 UIU/ML (ref 0.45–4.5)
UIBC SERPL-MCNC: 317 UG/DL (ref 131–425)
WBC # BLD AUTO: 7.8 X10E3/UL (ref 3.4–10.8)

## 2024-09-25 LAB — INHIBIN A SERPL-MCNC: 53.5 PG/ML

## 2024-09-26 LAB — ESTROGEN SERPL-MCNC: 237 PG/ML

## 2024-09-30 ENCOUNTER — OFFICE VISIT (OUTPATIENT)
Dept: SLEEP MEDICINE | Facility: HOSPITAL | Age: 49
End: 2024-09-30
Payer: COMMERCIAL

## 2024-09-30 VITALS
HEIGHT: 69 IN | SYSTOLIC BLOOD PRESSURE: 118 MMHG | DIASTOLIC BLOOD PRESSURE: 82 MMHG | WEIGHT: 238 LBS | OXYGEN SATURATION: 94 % | HEART RATE: 81 BPM | BODY MASS INDEX: 35.25 KG/M2

## 2024-09-30 DIAGNOSIS — G47.33 OSA ON CPAP: Primary | ICD-10-CM

## 2024-09-30 DIAGNOSIS — M25.551 PAIN OF RIGHT HIP: Primary | ICD-10-CM

## 2024-09-30 PROCEDURE — G0463 HOSPITAL OUTPT CLINIC VISIT: HCPCS

## 2024-09-30 NOTE — PROGRESS NOTES
"[facility]UL[facility]         Dr Shailesh Ibrahim  [unfilled]  Patient Care Team:  Iesha Britt MD as PCP - General  Tracie Weems APRN as Nurse Practitioner (Psychiatry)    Chief Complaint:Essentially home sleep study consistent with mild MARYCARMEN AHI 5 events per hour  AHI worse in supine positioning  Minimal sleep-related hypoxemia  Snoring noted throughout the study    Interval History: Patient here after set up for compliance visit.  Currently set up on auto CPAP 5 to 15 cm.  Compliance 78% average daily use 7 hours 1 minutes.  AHI and leak within normal limits.  She goes to bed 10 gets up 6 gets about 8+ hours of sleep and feels rested.  No tobacco no alcohol no caffeine abuse.  Currently has a nasal pillow that fits well.  Supplies been adequate.  Clinton was not completed  Overall she tells me that she still does not feel fully rested.  She feels tired during the daytime.  Still getting used to the mask and CPAP machine.    REVIEW OF SYSTEMS:   CARDIOVASCULAR: No chest pain, chest pressure or chest discomfort. No palpitations or edema.   RESPIRATORY: No shortness of breath, cough or sputum.   GASTROINTESTINAL: No anorexia, nausea, vomiting or diarrhea. No abdominal pain or blood.   HEMATOLOGIC: No bleeding or bruising.     Ventilator/Non-Invasive Ventilation Settings (From admission, onward)      None              Vital Signs  Heart Rate:  [81] 81  BP: (118)/(82) 118/82  [unfilled]  Flowsheet Rows      Flowsheet Row First Filed Value   Admission Height 175.3 cm (69.02\") Documented at 09/30/2024 0939   Admission Weight 108 kg (238 lb) Documented at 09/30/2024 0939            Physical Exam:  Patient is examined using the personal protective equipment as per guidelines from infection control for this particular patient as enacted.  Hand hygiene was performed before and after patient interaction.   General Appearance:    Alert, cooperative, in no acute distress.  Following simple commands  ENT " Mallampati between 3 and 4 no nasal congestion  Neck midline trachea, no thyromegaly   Lungs:     Clear to auscultation, respirations regular, even and                  unlabored    Heart:    Regular rhythm and normal rate, normal S1 and S2, no            murmur, no gallop, no rub, no click   Chest Wall:    No abnormalities observed   Abdomen:     Normal bowel sounds, no masses, no organomegaly, soft        nontender, nondistended, no guarding, no rebound                tenderness   Extremities:   Moves all extremities well, no edema, no cyanosis, no             redness  CNS no focal neurological deficits normal sensory exam  Skin no rashes no nodules  Musculoskeletal no cyanosis no clubbing normal range of motion     Results Review:                                          I reviewed the patient's new clinical results.  I personally viewed and interpreted the patient's chest x-ray.        Medication Review:       No current facility-administered medications for this visit.      ASSESSMENT:   Mild MARYCARMEN  Hypersomnia  Insomnia  Allergic rhinitis  Overweight    PLAN:  Reviewed compliance download with her current CPAP  Compliance AHI leak look excellent  Continue current auto CPAP 5 to 15 cm  Patient happy with the current mask and pressure  I think we need to give her a little bit more time to get used to the machine and the mask  Sleep hygiene measures  Treatment of underlying comorbidities  Treatment of allergies  Follow-up in 6 months      Shailesh Ibrahim MD  09/30/24  09:44 EDT

## 2024-10-03 LAB — MIS SERPL-MCNC: 0.06 NG/ML

## 2024-10-14 ENCOUNTER — TREATMENT (OUTPATIENT)
Dept: PHYSICAL THERAPY | Facility: CLINIC | Age: 49
End: 2024-10-14
Payer: COMMERCIAL

## 2024-10-14 DIAGNOSIS — M25.551 RIGHT HIP PAIN: Primary | ICD-10-CM

## 2024-10-14 DIAGNOSIS — R26.2 DIFFICULTY WALKING: ICD-10-CM

## 2024-10-14 PROCEDURE — 97162 PT EVAL MOD COMPLEX 30 MIN: CPT | Performed by: PHYSICAL THERAPIST

## 2024-10-14 PROCEDURE — 97530 THERAPEUTIC ACTIVITIES: CPT | Performed by: PHYSICAL THERAPIST

## 2024-10-14 PROCEDURE — 97110 THERAPEUTIC EXERCISES: CPT | Performed by: PHYSICAL THERAPIST

## 2024-10-14 NOTE — PROGRESS NOTES
Physical Therapy Initial Evaluation and Plan of Care    [facility]  [address] [place] [address]  [phone] (phone)  [phone] (fax)    Patient: Marcia Shi   : 1975  Diagnosis/ICD-10 Code:  Right hip pain [M25.551]  Referring practitioner: Iesha Britt MD  Date of Initial Visit: 10/14/2024  Today's Date: 10/14/2024  Patient seen for 1 sessions           Past Medical History:   Diagnosis Date    Anxiety     Chronic pain disorder     Depression     Ganglion cyst of wrist     UTI (urinary tract infection)     Vitamin D deficiency         Past Surgical History:   Procedure Laterality Date     SECTION      COLONOSCOPY N/A 2021    Procedure: COLONOSCOPY with polypectomy;  Surgeon: Eleuterio Crawford MD;  [place] [facility] [place]  Service: Gastroenterology;  Laterality: N/A;  polyps, diverticulosis    COLONOSCOPY W/ POLYPECTOMY N/A 2022    Procedure: COLONOSCOPY WITH POLYPECTOMY;  Surgeon: Eleuterio Crawford MD;  [place] [facility] [place];  Service: Gastroenterology;  Laterality: N/A;  POLYPS X4    NASAL SEPTUM SURGERY      TONSILLECTOMY          Subjective Evaluation    History of Present Illness  Mechanism of injury: Patient complains of R hip pain. Pain began a few months ago insidiously. X-Ray shows some arthritic changes. Pain worsens with prolonged sitting, standing, and walking. She is unable to twist her hip to don shoes/socks. Pain also worsens with transfers and getting in/out of the car. She avoids stairs if possible.    She has been taking meloxicam which does seem to help reduce her pain a little.     X-Ray: There is bilateral hip joint narrowing, this is greater on the  right than the left. There is additionally on the right periarticular  bone hypertrophy seen. No avascular necrosis, fracture or bone lesion  seen. The overlying soft tissues have a satisfactory appearance.    PLOF: Used to walk her dog every evening  (30-35 min)- not able as often    PMH: scoliosis        Patient Occupation: [employer] Quality of life: good    Pain  Current pain ratin  At best pain ratin  At worst pain ratin  Location: R hip  Quality: sharp  Aggravating factors: ambulation, squatting, stairs, repetitive movement, standing and lifting  Progression: worsening    Social Support  Lives in: multiple-level home  Lives with: spouse and young children    Diagnostic Tests  X-ray: abnormal    Treatments  Previous treatment: medication  Patient Goals  Patient goals for therapy: increased strength, decreased pain, increased motion and return to sport/leisure activities           Objective          Palpation     Right Tenderness of the adductor longus, iliopsoas and sartorius.     Tenderness     Right Hip   Tenderness in the inguinal ligament.     Neurological Testing     Sensation     Hip   Left Hip   Intact: light touch    Right Hip   Intact: light touch    Active Range of Motion   Left Hip   Flexion: 100 degrees   External rotation (90/90): 37 degrees   Internal rotation (90/90): 35 degrees     Right Hip   Flexion: 85 degrees with pain  External rotation (90/90): 25 degrees with pain  Internal rotation (90/90): 35 degrees with pain    Strength/Myotome Testing     Left Hip   Planes of Motion   Flexion: 5  External rotation: 4  Internal rotation: 4    Right Hip   Planes of Motion   Flexion: 4  External rotation: 4-  Internal rotation: 4    Left Knee   Flexion: 4+  Extension: 4+    Right Knee   Flexion: 4  Extension: 4    Left Ankle/Foot   Dorsiflexion: 4+    Right Ankle/Foot   Dorsiflexion: 4+    Tests     Right Hip   Positive ADILIA, piriformis and scour.   Juan C: Hip flexion: 60.   90/90 SLR: Positive.   SLR: Positive.         See Exercise, Manual, and Modality Logs for complete treatment.       Functional Outcome Score: LEFS=24/80        Assessment & Plan       Assessment  Impairments: abnormal gait, activity intolerance, impaired  balance, impaired physical strength, lacks appropriate home exercise program and pain with function   Functional limitations: lifting, sleeping, walking, uncomfortable because of pain, sitting and standing   Assessment details: Marcia Shi is a 49 y.o. year-old female referred to physical therapy for R hip pain due to arthritic changes. She presents with a evolving clinical presentation.  She has co morbidity of scoliosis. Personal factors involve patient having to stand long periods of time at work which may affect her progress in the plan of care.  Pt was educated on course of treatment, possible reasons for hip pain, activity modifications, and use of ice/heat PRN. Pt was given a copy of HEP.  Signs and symptoms are consistent with physical therapy diagnosis of R hip pain and difficulty walking. Patient is appropriate for skilled PT to address impairments and to allow patient to return to prior level of function.    Prognosis: good    Goals  Plan Goals: STGs to be completed within 30 days:  -Patient will demonstrate compliance and independence with initial HEP  -Patient will increase R hip flexion AROM to 95 degrees or more to reduce pain when bending forward or getting in/out of car  -Patient will perform sit to stand transfer with equilateral WB and no UE assistance      LTGs to be completed within 90 days:  -Patient will increase R Hip ER AROM to 30 degrees or more to  allow patient to don shoes and socks  -Patient will complete community mobility >30 min without increased hip pain to allow patient to walk her dog regularly.   -Patient will improve score on LEFS from 24 at eval to 40 or greater to improve quality of life      Plan  Therapy options: will be seen for skilled therapy services  Planned modality interventions: TENS, ultrasound, traction, dry needling, cryotherapy, iontophoresis, thermotherapy (hydrocollator packs) and electrical stimulation/Russian stimulation  Planned therapy interventions:  postural training, stretching, therapeutic activities, gait training, home exercise program, flexibility, strengthening, manual therapy, balance/weight-bearing training and abdominal trunk stabilization  Other planned therapy interventions: Aquatic PT  Frequency: 1x week  Duration in visits: 20  Duration in weeks: 6  Treatment plan discussed with: patient  Plan details: Physical therapy for core stabilization, LE strength/stability, hip ROM, gait training, balance, and posture        Timed:  Manual Therapy:         mins  69871;  Therapeutic Exercise:    12     mins  45809;     Neuromuscular Nicholas:        mins  32401;    Therapeutic Activity:     10     mins  35538;     Gait Training:           mins  56290;     Ultrasound:          mins  41224;    Iontophoresis         mins 09835;  Self Care    8     mins 30685    Untimed:  Electrical Stimulation:         mins  00071 ( );  Traction:       mins  40876;   Dry Needling   (1-2 muscles)   _     mins 00532 (Self-pay)  Dry Needling (3-4 muscles)  _     mins 58081 (Self-pay)  Dry Needling Trial    _     mins DRYNDLTRIAL  (No Charge)  Low Eval          Mins  14757  Mod Eval     20     Mins  01798  High Eval                            Mins  40071  Re- Eval                           Mins  77150    Timed Treatment:   30   mins   Total Treatment:     60   mins    PT SIGNATURE: Staci Salgado PT     License Number: [place] [id number]    Electronically signed by Staci Salgado PT, 10/14/24, 10:08 AM EDT    DATE TREATMENT INITIATED: 10/14/2024    Initial Certification  Certification Period: 1/12/2025  I certify that the therapy services are furnished while this patient is under my care.  The services outlined above are required by this patient, and will be reviewed every 90 days.     PHYSICIAN: Iesha Britt MD   NPI: [id number]                                         DATE:     Please sign and return via fax to [phone] Thank you, [facility].

## 2024-10-21 ENCOUNTER — TREATMENT (OUTPATIENT)
Dept: PHYSICAL THERAPY | Facility: CLINIC | Age: 49
End: 2024-10-21
Payer: COMMERCIAL

## 2024-10-21 DIAGNOSIS — M25.551 RIGHT HIP PAIN: Primary | ICD-10-CM

## 2024-10-21 DIAGNOSIS — R26.2 DIFFICULTY WALKING: ICD-10-CM

## 2024-10-21 PROCEDURE — 97530 THERAPEUTIC ACTIVITIES: CPT | Performed by: PHYSICAL THERAPIST

## 2024-10-21 PROCEDURE — 97110 THERAPEUTIC EXERCISES: CPT | Performed by: PHYSICAL THERAPIST

## 2024-10-21 PROCEDURE — 97014 ELECTRIC STIMULATION THERAPY: CPT | Performed by: PHYSICAL THERAPIST

## 2024-10-21 NOTE — PROGRESS NOTES
Physical Therapy Daily Treatment Note      Patient: Marcia Shi   : 1975  Referring practitioner: Iesha Britt MD  Date of Initial Visit: Type: THERAPY  Noted: 10/14/2024  Today's Date: 10/21/2024  Patient seen for 2 sessions         Marcia Shi reports: no longer taking pain medication; took last one 1 week ago.            Objective   See Exercise, Manual, and Modality Logs for complete treatment.       Assessment/Plan  Pt presents with moderate antalgic gait and continued hip and groin pain. Pt has appt with ortho on Thursday 10/24 for further assessment. Added HS/Itband stretch along with hip flexor stretch. Increased hip stability exercises. Pt demonstrates good strength and technique with exercises however limited ROM of R hip due to pain; discussed possible causes of pain, treatment options, imaging. Trial of e-stim with ice and end of session.        Progress per Plan of Care and Progress strengthening /stabilization /functional activity           Timed:  Manual Therapy:    -     mins  23253;  Therapeutic Exercise:    30     mins  09921;     Neuromuscular Nicholas:    -    mins  07743;    Therapeutic Activity:     10     mins  26173;     Gait Training:      -     mins  17793;     Ultrasound:     -     mins  63633;      Untimed:  Electrical Stimulation:    15     mins  68524 ( );  Mechanical Traction:    -     mins  79207;   Dry needling:      -     mins  36636/    Timed Treatment:   45   mins   Total Treatment:     60   mins  Perlita Power PT  Physical Therapist    License #: [id number]

## 2024-10-24 ENCOUNTER — OFFICE VISIT (OUTPATIENT)
Dept: ORTHOPEDIC SURGERY | Facility: CLINIC | Age: 49
End: 2024-10-24
Payer: COMMERCIAL

## 2024-10-24 ENCOUNTER — LAB (OUTPATIENT)
Dept: LAB | Facility: HOSPITAL | Age: 49
End: 2024-10-24
Payer: COMMERCIAL

## 2024-10-24 VITALS
WEIGHT: 238 LBS | HEIGHT: 69 IN | HEART RATE: 86 BPM | SYSTOLIC BLOOD PRESSURE: 123 MMHG | DIASTOLIC BLOOD PRESSURE: 82 MMHG | BODY MASS INDEX: 35.25 KG/M2

## 2024-10-24 DIAGNOSIS — M25.50 MULTIPLE JOINT PAIN: ICD-10-CM

## 2024-10-24 DIAGNOSIS — M16.11 PRIMARY OSTEOARTHRITIS OF RIGHT HIP: Primary | ICD-10-CM

## 2024-10-24 LAB
CHROMATIN AB SERPL-ACNC: <10 IU/ML (ref 0–14)
CRP SERPL-MCNC: <0.3 MG/DL (ref 0–0.5)
ERYTHROCYTE [SEDIMENTATION RATE] IN BLOOD: 16 MM/HR (ref 0–20)

## 2024-10-24 PROCEDURE — 86431 RHEUMATOID FACTOR QUANT: CPT

## 2024-10-24 PROCEDURE — 86038 ANTINUCLEAR ANTIBODIES: CPT

## 2024-10-24 PROCEDURE — 36415 COLL VENOUS BLD VENIPUNCTURE: CPT

## 2024-10-24 PROCEDURE — 85652 RBC SED RATE AUTOMATED: CPT

## 2024-10-24 PROCEDURE — 86140 C-REACTIVE PROTEIN: CPT

## 2024-10-24 RX ORDER — MELOXICAM 15 MG/1
15 TABLET ORAL DAILY
Qty: 21 TABLET | Refills: 0 | Status: SHIPPED | OUTPATIENT
Start: 2024-10-24

## 2024-10-24 NOTE — PROGRESS NOTES
Subjective:     Patient ID: Marcia Shi is a 49 y.o. female.    Chief Complaint:  Right lower extremity pain, new patient to examiner  History of Present Illness  History of Present Illness    Marcia Shi [age]-year-old female presents to clinic today for evaluation of her right lower extremity.  She reports scoliosis at an early age and she is experiencing pain at the right side pain began for years at the posterior aspect of the lumbar spine she did complete x-ray images which are available for review in chart.  Last year in 2023 in August she began experiencing significant stiffness at the low back on the right side she did try PT which did seem to help but she is now experiencing pain that is present on the lateral aspect of the hip radiating to the groin and pain at the lateral aspect of the knee acute onset of symptoms began July 2023.  Rates discomfort between a 5 to a 7 out of a 10 shooting and aching in nature.  She is experiencing stiffness, pain with acing descending stairs, standing, seated, working, driving, walking pain at work is pretty significantly present interfering with her day-to-day activities.  She is experiencing pain when she is attempting to wash her foot or even put socks or clothes on.  She is tried ice and rest.  She is currently taking meloxicam for the last 3 weeks.  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  She did recently returned from a trip to [place] which she did include excessive amounts of ambulatory activities.  She does report familial history including arthritis and certain if autoimmune.  Family history includes mother who is no longer living.  X-ray imaging also completed of her right hip which available for review in chart.  Denies any other concerns present.       Social History     Occupational History    Not on file   Tobacco Use    Smoking status: Former     Current packs/day: 0.00     Average packs/day: 0.5  packs/day for 10.0 years (5.0 ttl pk-yrs)     Types: Cigarettes     Start date: 1/3/1995     Quit date: 1/3/2005     Years since quittin.8    Smokeless tobacco: Never    Tobacco comments:     quit 15 yrs ago   Vaping Use    Vaping status: Never Used   Substance and Sexual Activity    Alcohol use: Yes     Comment: occa    Drug use: Never    Sexual activity: Yes     Partners: Male     Birth control/protection: Condom      Past Medical History:   Diagnosis Date    Anxiety     Chronic pain disorder     Depression     Ganglion cyst of wrist     Hip arthrosis     Scoliosis     UTI (urinary tract infection)     Vitamin D deficiency      Past Surgical History:   Procedure Laterality Date     SECTION      COLONOSCOPY N/A 2021    Procedure: COLONOSCOPY with polypectomy;  Surgeon: Eleuterio Crawford MD;  [place] [facility] [place]  Service: Gastroenterology;  Laterality: N/A;  polyps, diverticulosis    COLONOSCOPY W/ POLYPECTOMY N/A 2022    Procedure: COLONOSCOPY WITH POLYPECTOMY;  Surgeon: Eleuterio Crawford MD;  [place] [facility] [place]  Service: Gastroenterology;  Laterality: N/A;  POLYPS X4    NASAL SEPTUM SURGERY      TONSILLECTOMY         Family History   Problem Relation Age of Onset    Hypertension Mother     Cervical cancer Mother 79    Dementia Mother     Depression Mother     Cancer Mother     Colon cancer Father 70    Lymphoma Father     Cancer Father     Breast cancer Neg Hx     Ovarian cancer Neg Hx     Uterine cancer Neg Hx     Deep vein thrombosis Neg Hx     Pulmonary embolism Neg Hx                Objective:  Physical Exam    Vital signs reviewed.   General: No acute distress.  Eyes: conjunctiva clear; pupils equally round and reactive  ENT: external ears and nose atraumatic; oropharynx clear  CV: no peripheral edema  Resp: normal respiratory effort  Skin: no rashes or wounds; normal turgor  Psych: mood and affect appropriate; recent and remote memory intact    Vitals:     "10/24/24 1111   BP: 123/82   Pulse: 86   Weight: 108 kg (238 lb)   Height: 175.3 cm (69.02\")         10/24/24  1111   Weight: 108 kg (238 lb)     Body mass index is 35.13 kg/m².      Right Hip Exam     Tenderness   The patient is experiencing tenderness in the greater trochanter and anterior.    Range of Motion   Abduction:  40   Adduction:  20   Extension:  0   Flexion:  100   External rotation:  40   Internal rotation:  20     Muscle Strength   Abduction: 4/5   Adduction: 4/5   Flexion: 4/5     Tests   ADILIA: positive  Fadir:  Positive FADIR test    Other   Erythema: absent  Sensation: normal  Pulse: present    Comments:  Positive logroll exam  Positive stinchfield exam               Physical Exam      Imaging:    XR HIP W OR WO PELVIS 2-3 VIEW RIGHT-     CLINICAL INFORMATION: Pain.     FINDINGS: There is bilateral hip joint narrowing, this is greater on the  right than the left. There is additionally on the right periarticular  bone hypertrophy seen. No avascular necrosis, fracture or bone lesion  seen. The overlying soft tissues have a satisfactory appearance.     CONCLUSION: Right hip joint degeneration as described above.     This report was finalized on 9/25/2024 3:16 PM by Dr. Mario Reynolds M.D  on Workstation: [email or web address]  Independently reviewed x-ray imaging right hip moderate hip arthritis with reactive osteophytes along the right hip, mild arthritis left hip  Assessment:        1. Primary osteoarthritis of right hip    2. Multiple joint pain         Assessment & Plan      Plan:  Discussed plan of care with patient.  We did discuss holding off on work for the next 2 weeks we will plan to have her return to work November 11, 2024.  I will resume the meloxicam for now.  We will proceed with fluoroscopy guided injection right hip.  We also discussed surgical intervention including total hip arthroplasty however would like to trial conservative treatment before proceeding with total hip arthroplasty.  " Unsure of her familial history we will proceed with autoimmune workup to rule out autoimmune condition. I do recommend she continue with strengthening exercises with PT for quad hamstring strengthening.  All questions answered.  Orders:  Orders Placed This Encounter   Procedures    FL Guide For Pain Meds Injection Joint    Sedimentation Rate    C-reactive Protein    Rheumatoid Factor    PIYUSH     New Medications Ordered This Visit   Medications    meloxicam (Mobic) 15 MG tablet     Sig: Take 1 tablet by mouth Daily.     Dispense:  21 tablet     Refill:  0         Dragon dictation utilized

## 2024-10-25 LAB — ANA SER QL: NEGATIVE

## 2024-10-28 ENCOUNTER — TELEMEDICINE (OUTPATIENT)
Dept: PSYCHIATRY | Facility: CLINIC | Age: 49
End: 2024-10-28
Payer: COMMERCIAL

## 2024-10-28 ENCOUNTER — PATIENT ROUNDING (BHMG ONLY) (OUTPATIENT)
Dept: ORTHOPEDIC SURGERY | Facility: CLINIC | Age: 49
End: 2024-10-28
Payer: COMMERCIAL

## 2024-10-28 ENCOUNTER — TREATMENT (OUTPATIENT)
Dept: PHYSICAL THERAPY | Facility: CLINIC | Age: 49
End: 2024-10-28
Payer: COMMERCIAL

## 2024-10-28 DIAGNOSIS — F41.1 GENERALIZED ANXIETY DISORDER: Primary | ICD-10-CM

## 2024-10-28 DIAGNOSIS — M25.551 RIGHT HIP PAIN: Primary | ICD-10-CM

## 2024-10-28 DIAGNOSIS — G47.9 SLEEP DIFFICULTIES: ICD-10-CM

## 2024-10-28 DIAGNOSIS — F33.0 MILD EPISODE OF RECURRENT MAJOR DEPRESSIVE DISORDER: ICD-10-CM

## 2024-10-28 DIAGNOSIS — R26.2 DIFFICULTY WALKING: ICD-10-CM

## 2024-10-28 PROCEDURE — 97014 ELECTRIC STIMULATION THERAPY: CPT | Performed by: PHYSICAL THERAPIST

## 2024-10-28 PROCEDURE — 96127 BRIEF EMOTIONAL/BEHAV ASSMT: CPT | Performed by: NURSE PRACTITIONER

## 2024-10-28 PROCEDURE — 97530 THERAPEUTIC ACTIVITIES: CPT | Performed by: PHYSICAL THERAPIST

## 2024-10-28 PROCEDURE — 97110 THERAPEUTIC EXERCISES: CPT | Performed by: PHYSICAL THERAPIST

## 2024-10-28 PROCEDURE — 99214 OFFICE O/P EST MOD 30 MIN: CPT | Performed by: NURSE PRACTITIONER

## 2024-10-28 RX ORDER — BUPROPION HYDROCHLORIDE 300 MG/1
300 TABLET ORAL DAILY
Qty: 90 TABLET | Refills: 0 | Status: SHIPPED | OUTPATIENT
Start: 2024-10-28

## 2024-10-28 RX ORDER — BUSPIRONE HYDROCHLORIDE 7.5 MG/1
7.5 TABLET ORAL 3 TIMES DAILY
Qty: 90 TABLET | Refills: 2 | Status: SHIPPED | OUTPATIENT
Start: 2024-10-28

## 2024-10-28 NOTE — PROGRESS NOTES
This provider is located at [facility], [address] [place] [address].The Patient is seen remotely at home, [address] [place] [address] via [employer].  Patient is being seen via telehealth and confirm that they are in a secure environment for this session. The patient's condition being diagnosed/treated is appropriate for telemedicine. The provider identified himself/herself: herself as well as her credentials.   The patient gave consent to be seen remotely, and when consent is given they understand that the consent allows for patient identifiable information to be sent to a third party as needed.   They may refuse to be seen remotely at any time. The electronic data is encrypted and password protected, and the patient has been advised of the potential risks to privacy not withstanding such measures.    You have chosen to receive care through a telehealth visit.  Do you consent to use a video/audio connection for your medical care today? Yes. Patient verified Name, , and address.       Chief Complaint  Anxiety and depression     Subjective          Marciabillie Shi presents to [facility] for medication management.     History of Present Illness  Patient presents today reporting things were going well with no issues or concerns until last Thursday.  Patient states last Thursday she met with the orthopedic surgeon and was told she would need a hip replacement.  Patient states that since she has been off work for a couple weeks and notices that she has not been taking the news well.  She states she is worried about what activity she will be able to do in going back to her job.  Patient states that she has been worrying and more nervous and anxious and on edge which has got her feeling down and sad more lately.  Patient states prior to this things were going well.  Notes she is still sleeping and eating well.  Denies any side effects to the  medications.  Patient states she is just worried about her ability to get back to her daily activities.  Reassured her that surgeries have come a long way even though she is younger and is helped a lot of patients get back to their activities without being in physical pain.  She states even the physical therapist and orthopedic surgeon have recommended this but will start steroid injections soon and surgery is hopefully going to be planned for next year.  Patient denies any SI/HI/AH/VH.      Objective   Vital Signs:   There were no vitals taken for this visit.  Due to the remote nature of this encounter (virtual encounter), vitals were unable to be obtained.  Height stated at 69 inches.  Weight stated at 238 pounds.      PHQ-9 Score:   PHQ-9 Total Score:(Patient-Rptd) 5     PHQ-9 Depression Screening  Little interest or pleasure in doing things? (Patient-Rptd) Several days   Feeling down, depressed, or hopeless? (Patient-Rptd) Several days   PHQ-2 Total Score (Patient-Rptd) 2   Trouble falling or staying asleep, or sleeping too much? (Patient-Rptd) Not at all   Feeling tired or having little energy? (Patient-Rptd) Over half   Poor appetite or overeating? (Patient-Rptd) Not at all   Feeling bad about yourself - or that you are a failure or have let yourself or your family down? (Patient-Rptd) Not at all   Trouble concentrating on things, such as reading the newspaper or watching television? (Patient-Rptd) Several days   Moving or speaking so slowly that other people could have noticed? Or the opposite - being so fidgety or restless that you have been moving around a lot more than usual? (Patient-Rptd) Not at all   Thoughts that you would be better off dead, or of hurting yourself in some way? (Patient-Rptd) Not at all   PHQ-9 Total Score (Patient-Rptd) 5   If you checked off any problems, how difficult have these problems made it for you to do your work, take care of things at home, or get along with other people?  (Patient-Rptd) Somewhat difficult         PHQ-9 Total Score: (Patient-Rptd) 5     NICKY-7  Feeling nervous, anxious or on edge: (Patient-Rptd) Several days  Not being able to stop or control worrying: (Patient-Rptd) Several days  Worrying too much about different things: (Patient-Rptd) Several days  Trouble Relaxing: (Patient-Rptd) Several days  Being so restless that it is hard to sit still: (Patient-Rptd) Not at all  Feeling afraid as if something awful might happen: (Patient-Rptd) Several days  Becoming easily annoyed or irritable: (Patient-Rptd) Several days  NICKY 7 Total Score: (Patient-Rptd) 6  If you checked any problems, how difficult have these problems made it for you to do your work, take care of things at home, or get along with other people: (Patient-Rptd) Somewhat difficult      Patient screened positive for depression based on a PHQ-9 score of 5 on 10/28/2024. Follow-up recommendations include: Prescribed antidepressant medication treatment.        Mental Status Exam:   Hygiene:   good  Cooperation:  Cooperative  Eye Contact:  Good  Psychomotor Behavior:  Appropriate  Affect:  Appropriate  Mood: sad and anxious  Speech:  Normal  Thought Process:  Goal directed  Thought Content:  Normal  Suicidal:  None  Homicidal:  None  Hallucinations:  None  Delusion:  None  Memory:  Intact  Orientation:  Person, Place, Time, and Situation  Reliability:  good  Insight:  Good  Judgement:  Good  Impulse Control:  Good  Physical/Medical Issues:  Yes see medical hx      Current Medications:   Current Outpatient Medications   Medication Sig Dispense Refill    buPROPion XL (Wellbutrin XL) 300 MG 24 hr tablet Take 1 tablet by mouth Daily. 90 tablet 0    busPIRone (BUSPAR) 7.5 MG tablet Take 1 tablet by mouth 3 (Three) Times a Day. 90 tablet 2    cetirizine (ZyrTEC) 10 MG tablet Take 1 tablet by mouth Daily.      cholecalciferol (VITAMIN D3) 1000 UNITS tablet Take by mouth.      CRANBERRY PO Take by mouth.      fluticasone  (FLONASE) 50 MCG/ACT nasal spray 2 sprays into the nostril(s) as directed by provider Daily. 16 g 5    Melatonin 5 MG tablet dispersible Take 2 tablets by mouth.      meloxicam (Mobic) 15 MG tablet Take 1 tablet by mouth Daily. 21 tablet 0    Multiple Vitamin (MULTIVITAMIN) capsule Take by mouth.      traZODone (DESYREL) 50 MG tablet Take 1 tablet by mouth Every Night. 90 tablet 1     No current facility-administered medications for this visit.       Physical Exam  Nursing note reviewed.   Constitutional:       Appearance: Normal appearance.   Neurological:      Mental Status: She is alert.   Psychiatric:         Attention and Perception: Attention and perception normal.         Mood and Affect: Mood is anxious and depressed.         Speech: Speech normal.         Behavior: Behavior is cooperative.         Thought Content: Thought content normal.         Cognition and Memory: Cognition and memory normal.         Judgment: Judgment normal.        Result Review :     The following data was reviewed by: NICOLE Griffin on 10/28/2024:  Common labs          5/23/2024    08:16 9/23/2024    07:53   Common Labs   Glucose 97     BUN 11     Creatinine 0.82     Sodium 137     Potassium 4.4     Chloride 102     Calcium 9.1     Total Protein 6.2     Albumin 4.2     Total Bilirubin 0.5     Alkaline Phosphatase 60     AST (SGOT) 21     ALT (SGPT) 24     WBC 6.57  7.8    Hemoglobin 12.3  13.7    Hematocrit 37.9  43.4    Platelets 323  389    Total Cholesterol 214     Triglycerides 103     HDL Cholesterol 59     LDL Cholesterol  137     Hemoglobin A1C  5.9      CMP          5/23/2024    08:16   CMP   Glucose 97    BUN 11    Creatinine 0.82    Sodium 137    Potassium 4.4    Chloride 102    Calcium 9.1    Total Protein 6.2    Albumin 4.2    Globulin 2.0    Total Bilirubin 0.5    Alkaline Phosphatase 60    AST (SGOT) 21    ALT (SGPT) 24    BUN/Creatinine Ratio 13.4      CBC          5/23/2024    08:16 9/23/2024    07:53   CBC    WBC 6.57  7.8    RBC 4.21  4.84    Hemoglobin 12.3  13.7    Hematocrit 37.9  43.4    MCV 90.0  90    MCH 29.2  28.3    MCHC 32.5  31.6    RDW 13.0  13.6    Platelets 323  389      CBC w/diff          5/23/2024    08:16 9/23/2024    07:53   CBC w/Diff   WBC 6.57  7.8    RBC 4.21  4.84    Hemoglobin 12.3  13.7    Hematocrit 37.9  43.4    MCV 90.0  90    MCH 29.2  28.3    MCHC 32.5  31.6    RDW 13.0  13.6    Platelets 323  389    Neutrophil Rel %  69    Lymphocyte Rel %  21    Monocyte Rel %  7    Eosinophil Rel %  1    Basophil Rel %  1      Lipid Panel          5/23/2024    08:16   Lipid Panel   Total Cholesterol 214    Triglycerides 103    HDL Cholesterol 59    VLDL Cholesterol 18    LDL Cholesterol  137    LDL/HDL Ratio 2.28      TSH          5/23/2024    08:16 9/23/2024    07:53   TSH   TSH 2.680  3.250      Electrolytes          5/23/2024    08:16   Electrolytes   Sodium 137    Potassium 4.4    Chloride 102    Calcium 9.1      Renal Profile          5/23/2024    08:16   Renal Profile   BUN 11    Creatinine 0.82      BMP          5/23/2024    08:16   BMP   BUN 11    Creatinine 0.82    Sodium 137    Potassium 4.4    Chloride 102    CO2 23.3    Calcium 9.1      Most Recent A1C          9/23/2024    07:53   HGBA1C Most Recent   Hemoglobin A1C 5.9          Data reviewed : PCP notes         Assessment and Plan    Problem List Items Addressed This Visit    None  Visit Diagnoses       Generalized anxiety disorder    -  Primary    Relevant Medications    busPIRone (BUSPAR) 7.5 MG tablet    buPROPion XL (Wellbutrin XL) 300 MG 24 hr tablet    Mild episode of recurrent major depressive disorder        Relevant Medications    busPIRone (BUSPAR) 7.5 MG tablet    buPROPion XL (Wellbutrin XL) 300 MG 24 hr tablet    Sleep difficulties                Encounter Diagnoses   Name Primary?    Generalized anxiety disorder Yes    Mild episode of recurrent major depressive disorder     Sleep difficulties           TREATMENT PLAN/GOALS:  Continue supportive psychotherapy efforts and medications as indicated. Treatment and medication options discussed during today's visit. Patient ackowledged and verbally consented to continue with current treatment plan and was educated on the importance of compliance with treatment and follow-up appointments.    MEDICATION ISSUES:  We discussed risks, benefits, and side effects of the above medications and the patient was agreeable with the plan. Patient was educated on the importance of compliance with treatment and follow-up appointments.  Patient is agreeable to call the office with any worsening of symptoms or onset of side effects. Patient is agreeable to call 911 or go to the nearest ER should he/she begin having SI/HI.     -Increase BuSpar to 7.5 mg 3 times daily for anxiety.  Highly encouraged her if she had any worsening symptoms or concerns contact clinic she verbalized understanding.  -Continue Wellbutrin 300 mg XL daily as adjunct for depression.  -Continue trazodone 50 mg at night as needed for sleep.  -Highly encouraged patient if she had any concerns or worsening symptoms to contact the clinic for sooner appointment she verbalized understanding.     Counseled patient regarding multimodal approach with healthy nutrition, healthy sleep, regular physical activity, social activities, counseling, and medications.      Coping skills reviewed and encouraged positive framing of thoughts     Assisted patient in processing above session content; acknowledged and normalized patient’s thoughts, feelings, and concerns.  Applied  positive coping skills and behavior management in session.  Allowed patient to freely discuss issues without interruption or judgment. Provided safe, confidential environment to facilitate the development of positive therapeutic relationship and encourage open, honest communication. Assisted patient in identifying risk factors which would indicate the need for higher level of care  including thoughts to harm self or others and/or self-harming behavior and encouraged patient to contact this office, call 911, or present to the nearest emergency room should any of these events occur. Discussed crisis intervention services and means to access.     MEDS ORDERED DURING VISIT:  New Medications Ordered This Visit   Medications    busPIRone (BUSPAR) 7.5 MG tablet     Sig: Take 1 tablet by mouth 3 (Three) Times a Day.     Dispense:  90 tablet     Refill:  2    buPROPion XL (Wellbutrin XL) 300 MG 24 hr tablet     Sig: Take 1 tablet by mouth Daily.     Dispense:  90 tablet     Refill:  0           Follow Up   Return in about 4 weeks (around 11/25/2024), or if symptoms worsen or fail to improve, for Recheck.    Patient was given instructions and counseling regarding her condition or for health maintenance advice. Please see specific information pulled into the AVS if appropriate.     Some of the data in this electronic note has been brought forward from a previous encounter, any necessary changes have been made, it has been reviewed by this APRN, and it is accurate.      This document has been electronically signed by NICOLE Griffin  October 28, 2024 10:57 EDT    Part of this note may be an electronic transcription/translation of spoken language to printed text using the Dragon Dictation System.

## 2024-10-28 NOTE — PROGRESS NOTES
Physical Therapy Daily Treatment Note      Patient: Marcia Shi   : 1975  Referring practitioner: Iesha Britt MD  Date of Initial Visit: Type: THERAPY  Noted: 10/14/2024  Today's Date: 10/28/2024  Patient seen for 3 sessions         Marcia Shi reports: the e-stim helped but minimal relief from the exercises. Pt her steroid injection tomorrow in R hip.               Objective   See Exercise, Manual, and Modality Logs for complete treatment.       Assessment/Plan  Unable to tolerate hip flexor stretch off the table; modified to prone press ups and prone HS curls to stretch anterior hip. Trial of manual LAD to RLE to offload R hip and decrease pain. Continued IFC and ice for decreased pain and WB tolerance. Assess response to injection; otherwise may refer back to ortho with minimal progress in PT thus far.        Progress per Plan of Care and Progress strengthening /stabilization /functional activity           Timed:  Manual Therapy:    -     mins  60929;  Therapeutic Exercise:    20     mins  89952;     Neuromuscular Nicholas:    -    mins  35113;    Therapeutic Activity:     10     mins  17865;     Gait Training:      -     mins  10360;     Ultrasound:     -     mins  48012;      Untimed:  Electrical Stimulation:    15     mins  92749 ( );  Mechanical Traction:    -     mins  78155;   Dry needling:      -     mins  81258/    Timed Treatment:   30   mins   Total Treatment:     45   mins  Perlita Power PT  Physical Therapist    License #: [id number]

## 2024-10-28 NOTE — PROGRESS NOTES
A My-Chart message has been sent to the patient for PATIENT ROUNDING with [facility].   Pt. Has had a cough & nasal congestion for a few weeks. No seasonal flu vaccine.  No diarrhea

## 2024-10-29 ENCOUNTER — HOSPITAL ENCOUNTER (OUTPATIENT)
Dept: GENERAL RADIOLOGY | Facility: HOSPITAL | Age: 49
Discharge: HOME | End: 2024-10-29
Admitting: FAMILY MEDICINE
Payer: COMMERCIAL

## 2024-10-29 ENCOUNTER — OFFICE VISIT (OUTPATIENT)
Dept: INTERNAL MEDICINE | Facility: CLINIC | Age: 49
End: 2024-10-29
Payer: COMMERCIAL

## 2024-10-29 VITALS
WEIGHT: 227 LBS | BODY MASS INDEX: 33.62 KG/M2 | HEIGHT: 69 IN | DIASTOLIC BLOOD PRESSURE: 70 MMHG | HEART RATE: 75 BPM | SYSTOLIC BLOOD PRESSURE: 108 MMHG | TEMPERATURE: 97.8 F | OXYGEN SATURATION: 98 %

## 2024-10-29 DIAGNOSIS — M54.6 CHRONIC BILATERAL THORACIC BACK PAIN: ICD-10-CM

## 2024-10-29 DIAGNOSIS — M41.9 SCOLIOSIS, UNSPECIFIED SCOLIOSIS TYPE, UNSPECIFIED SPINAL REGION: ICD-10-CM

## 2024-10-29 DIAGNOSIS — G89.29 CHRONIC BILATERAL THORACIC BACK PAIN: ICD-10-CM

## 2024-10-29 DIAGNOSIS — M41.9 SCOLIOSIS, UNSPECIFIED SCOLIOSIS TYPE, UNSPECIFIED SPINAL REGION: Primary | ICD-10-CM

## 2024-10-29 PROCEDURE — 72082 X-RAY EXAM ENTIRE SPI 2/3 VW: CPT

## 2024-10-29 PROCEDURE — 99213 OFFICE O/P EST LOW 20 MIN: CPT | Performed by: FAMILY MEDICINE

## 2024-10-29 NOTE — PROGRESS NOTES
Subjective   Marcia Shi is a 49 y.o. female.   Chief Complaint   Patient presents with    Back Pain     Patient is requesting an updated spine x-ray       Back Pain         Back pain-patient complains of whole back pain for at least 4 years.  It is thoracic back pain and lower back pain.  She has a history of scoliosis.  She was diagnosed in childhood. Pain is present most of the time.  She has a pain when she wakes up.  It is better after stretching, but then the pain gets worse at the end of the day.  Pain is worse with sitting, standing, walking.  It is better when she lays down.  She tried Mobic and it helped .  Pain is dull, bilateral, intensity 2-7/10.  With meloxicam it was 5/10.  She was evaluated by orthopedist for right hip pain 2 weeks ago.  They are considering hip replacement.   She was advised to follow-up with us for x-rays to evaluate  scoliosis.    Review of Systems   Musculoskeletal:  Positive for back pain.         Objective   Wt Readings from Last 3 Encounters:   10/29/24 103 kg (227 lb)   10/24/24 108 kg (238 lb)   09/30/24 108 kg (238 lb)      Vitals:    10/29/24 1043   BP: 108/70   Pulse: 75   Temp: 97.8 °F (36.6 °C)   SpO2: 98%     Temp Readings from Last 3 Encounters:   10/29/24 97.8 °F (36.6 °C)   09/23/24 97.5 °F (36.4 °C)   05/31/24 97.3 °F (36.3 °C)     BP Readings from Last 3 Encounters:   10/29/24 108/70   10/24/24 123/82   09/30/24 118/82     Pulse Readings from Last 3 Encounters:   10/29/24 75   10/24/24 86   09/30/24 81     Body mass index is 33.51 kg/m².    Physical Exam  Constitutional:       Appearance: She is well-developed.   Cardiovascular:      Rate and Rhythm: Normal rate and regular rhythm.      Heart sounds: Normal heart sounds.   Pulmonary:      Effort: Pulmonary effort is normal.      Breath sounds: Normal breath sounds.   Musculoskeletal:      Lumbar back: Normal. No deformity or tenderness.      Comments: Mild thoracic scoliosis.  Limited flexion in thoracic and  lumbar spine.  SLR negative BL.   Skin:     Findings: No erythema or lesion.   Neurological:      Deep Tendon Reflexes: Reflexes are normal and symmetric.   Psychiatric:         Behavior: Behavior normal.         Assessment & Plan   Diagnoses and all orders for this visit:    1. Scoliosis, unspecified scoliosis type, unspecified spinal region (Primary)  -     XR spine scoliosis 2 or 3 views; Future    2. Chronic bilateral thoracic back pain  -     XR spine scoliosis 2 or 3 views; Future        Back pain/scoliosis-ordering spine x-ray for scoliosis.

## 2024-11-01 DIAGNOSIS — M54.6 CHRONIC BILATERAL THORACIC BACK PAIN: ICD-10-CM

## 2024-11-01 DIAGNOSIS — G89.29 CHRONIC BILATERAL THORACIC BACK PAIN: ICD-10-CM

## 2024-11-01 DIAGNOSIS — M41.9 SCOLIOSIS, UNSPECIFIED SCOLIOSIS TYPE, UNSPECIFIED SPINAL REGION: Primary | ICD-10-CM

## 2024-11-04 ENCOUNTER — TREATMENT (OUTPATIENT)
Dept: PHYSICAL THERAPY | Facility: CLINIC | Age: 49
End: 2024-11-04
Payer: COMMERCIAL

## 2024-11-04 DIAGNOSIS — M25.551 RIGHT HIP PAIN: Primary | ICD-10-CM

## 2024-11-04 DIAGNOSIS — R26.2 DIFFICULTY WALKING: ICD-10-CM

## 2024-11-04 DIAGNOSIS — F33.0 MILD EPISODE OF RECURRENT MAJOR DEPRESSIVE DISORDER: ICD-10-CM

## 2024-11-04 PROCEDURE — 97014 ELECTRIC STIMULATION THERAPY: CPT | Performed by: PHYSICAL THERAPIST

## 2024-11-04 PROCEDURE — 97110 THERAPEUTIC EXERCISES: CPT | Performed by: PHYSICAL THERAPIST

## 2024-11-04 PROCEDURE — 97530 THERAPEUTIC ACTIVITIES: CPT | Performed by: PHYSICAL THERAPIST

## 2024-11-04 RX ORDER — BUPROPION HYDROCHLORIDE 300 MG/1
300 TABLET ORAL DAILY
Qty: 90 TABLET | Refills: 0 | Status: CANCELLED | OUTPATIENT
Start: 2024-11-04

## 2024-11-04 NOTE — TELEPHONE ENCOUNTER
Patient called office back and stated she picked up the Buspar, but not the Wellburtin that the pharmacy told her they did not have a script for that medication. I contacted [facility] pharmacy and spoke with Bess who stated yes they have the refill and patient can pick it up. Contacted the patient back and made her aware. Patient gave verbal understanding.

## 2024-11-04 NOTE — PROGRESS NOTES
Physical Therapy Daily Treatment Note    [facility]  [address] [place] [address]  [phone] (phone)  [phone] (fax)    Patient: Marcia Shi   : 1975  Diagnosis/ICD-10 Code:  Right hip pain [M25.551]  Referring practitioner: Iesha Britt MD  Date of Initial Visit: Type: THERAPY  Noted: 10/14/2024  Today's Date: 2024  Patient seen for 4 sessions           Subjective   Patient states her hip pain is still severe. She is having an injection on Wednesday and is hoping it gives her some relief.     Objective     See Exercise, Manual, and Modality Logs for complete treatment.     Assessment/Plan  Continued to focus on hip strength and AROM within tolerable pain range. Added warm up on NuStep to try and promote flexibility and movement in the hip joint. Also incorporated some core/ back strengthening exercises to address thoracic and lumbar degeneration and printed out updated HEP for patient.         Timed:    Manual Therapy:         mins  50346;  Therapeutic Exercise:    25     mins  60608;     Neuromuscular Nicholas:        mins  03171;    Therapeutic Activity:     10     mins  61842;     Gait Training:           mins  55614;     Ultrasound:          mins  75859;    Electrical Stimulation:         mins  73210 ( );  Iontophoresis         mins 65424;  Aquatic Therapy         mins 06635;    Untimed:  Electrical Stimulation:    15     mins  99704 ( );  Traction:         mins  65778;   Dry Needling   (1-2 muscles)       mins  (Self-pay)  Dry Needling (3-4 muscles)        mins  (Self-pay)  Dry Needling Trial          mins DRYNDLTRIAL  (No Charge)    Timed Treatment:   35   mins   Total Treatment:     50   mins    Staci Salgado PT  Physical Therapist    [place] License:[id number]

## 2024-11-06 ENCOUNTER — TELEPHONE (OUTPATIENT)
Dept: ORTHOPEDIC SURGERY | Facility: CLINIC | Age: 49
End: 2024-11-06
Payer: COMMERCIAL

## 2024-11-06 ENCOUNTER — HOSPITAL ENCOUNTER (OUTPATIENT)
Dept: GENERAL RADIOLOGY | Facility: HOSPITAL | Age: 49
Discharge: HOME | End: 2024-11-06
Payer: COMMERCIAL

## 2024-11-06 DIAGNOSIS — M16.11 PRIMARY OSTEOARTHRITIS OF RIGHT HIP: ICD-10-CM

## 2024-11-06 PROCEDURE — 25010000002 METHYLPREDNISOLONE PER 80 MG: Performed by: NURSE PRACTITIONER

## 2024-11-06 PROCEDURE — 77002 NEEDLE LOCALIZATION BY XRAY: CPT

## 2024-11-06 PROCEDURE — 25010000002 LIDOCAINE 1 % SOLUTION: Performed by: NURSE PRACTITIONER

## 2024-11-06 PROCEDURE — 25010000002 BUPIVACAINE 0.25 % SOLUTION: Performed by: NURSE PRACTITIONER

## 2024-11-06 PROCEDURE — 25510000001 IOPAMIDOL 61 % SOLUTION: Performed by: NURSE PRACTITIONER

## 2024-11-06 RX ORDER — BUPIVACAINE HYDROCHLORIDE 2.5 MG/ML
5 INJECTION, SOLUTION INFILTRATION; PERINEURAL
Status: DISCONTINUED | OUTPATIENT
Start: 2024-11-06 | End: 2024-11-06 | Stop reason: CLARIF

## 2024-11-06 RX ORDER — LIDOCAINE HYDROCHLORIDE 10 MG/ML
3 INJECTION, SOLUTION INFILTRATION; PERINEURAL ONCE
Status: COMPLETED | OUTPATIENT
Start: 2024-11-06 | End: 2024-11-06

## 2024-11-06 RX ORDER — METHYLPREDNISOLONE ACETATE 80 MG/ML
80 INJECTION, SUSPENSION INTRA-ARTICULAR; INTRALESIONAL; INTRAMUSCULAR; SOFT TISSUE
Status: COMPLETED | OUTPATIENT
Start: 2024-11-06 | End: 2024-11-06

## 2024-11-06 RX ORDER — IOPAMIDOL 612 MG/ML
2 INJECTION, SOLUTION INTRAVASCULAR
Status: COMPLETED | OUTPATIENT
Start: 2024-11-06 | End: 2024-11-06

## 2024-11-06 RX ORDER — BUPIVACAINE HYDROCHLORIDE 2.5 MG/ML
5 INJECTION, SOLUTION EPIDURAL; INFILTRATION; INTRACAUDAL ONCE
Status: DISCONTINUED | OUTPATIENT
Start: 2024-11-06 | End: 2024-11-07 | Stop reason: HOSPADM

## 2024-11-06 RX ADMIN — LIDOCAINE HYDROCHLORIDE 3 ML: 10 INJECTION, SOLUTION INFILTRATION; PERINEURAL at 09:45

## 2024-11-06 RX ADMIN — BUPIVACAINE HYDROCHLORIDE 5 ML: 2.5 INJECTION, SOLUTION INFILTRATION; PERINEURAL at 09:45

## 2024-11-06 RX ADMIN — METHYLPREDNISOLONE ACETATE 80 MG: 80 INJECTION, SUSPENSION INTRA-ARTICULAR; INTRALESIONAL; INTRAMUSCULAR; SOFT TISSUE at 09:45

## 2024-11-06 RX ADMIN — IOPAMIDOL 2 ML: 612 INJECTION, SOLUTION INTRAVENOUS at 09:45

## 2024-11-06 NOTE — TELEPHONE ENCOUNTER
Faxed over information on 11-5-2024, and gave physical copy of paperwork to patient when she cam into the office on 11-6-2024.

## 2024-11-11 ENCOUNTER — TREATMENT (OUTPATIENT)
Dept: PHYSICAL THERAPY | Facility: CLINIC | Age: 49
End: 2024-11-11
Payer: COMMERCIAL

## 2024-11-11 DIAGNOSIS — R26.2 DIFFICULTY WALKING: ICD-10-CM

## 2024-11-11 DIAGNOSIS — M25.551 RIGHT HIP PAIN: Primary | ICD-10-CM

## 2024-11-11 PROCEDURE — 97014 ELECTRIC STIMULATION THERAPY: CPT | Performed by: PHYSICAL THERAPIST

## 2024-11-11 PROCEDURE — 97110 THERAPEUTIC EXERCISES: CPT | Performed by: PHYSICAL THERAPIST

## 2024-11-11 PROCEDURE — 97530 THERAPEUTIC ACTIVITIES: CPT | Performed by: PHYSICAL THERAPIST

## 2024-11-11 NOTE — PROGRESS NOTES
30-Day / 10-Visit Progress Note     [facility]  [address] [place] [address]  [phone] (phone)  [phone] (fax)    Patient: Marcia Shi   : 1975  Diagnosis/ICD-10 Code:  Right hip pain [M25.551]  Referring practitioner: Iesha Britt MD  Date of Initial Visit: Type: THERAPY  Noted: 10/14/2024  Today's Date: 2024  Patient seen for 5 sessions      Subjective:     Clinical Progress: improved (more from injection than PT)  Home Program Compliance: Yes    Subjective   Injection was last Wednesday. Hip pain has improved but is still present. Patient still can't put shoes/socks on her R LE.     Objective     Palpation      Right Tenderness of the adductor longus, iliopsoas and sartorius.      Tenderness      Right Hip   Tenderness in the inguinal ligament.      Neurological Testing      Sensation      Hip   Left Hip   Intact: light touch     Right Hip   Intact: light touch     Active Range of Motion   Left Hip   Flexion: 100 degrees   External rotation (90/90): 37 degrees   Internal rotation (90/90): 35 degrees      Right Hip   Flexion: 95 degrees with pain  External rotation (90/90): 30 degrees with pain  Internal rotation (90/90): 35 degrees with pain     Strength/Myotome Testing      Left Hip   Planes of Motion   Flexion: 5  External rotation: 4  Internal rotation: 4     Right Hip   Planes of Motion   Flexion: 4  External rotation: 4  Internal rotation: 4     Left Knee   Flexion: 4+  Extension: 4+     Right Knee   Flexion: 4+  Extension: 4     Left Ankle/Foot   Dorsiflexion: 4+     Right Ankle/Foot   Dorsiflexion: 4+     Tests      Right Hip   Positive ADILIA, piriformis and scour.   Juan C: Hip flexion: 60.   90/90 SLR: Positive.   SLR: Positive.          See Exercise, Manual, and Modality Logs for complete treatment.         Functional Outcome Score: LEFS=27/80         Assessment/Plan  Marcia Shi has been seen for 5 physical therapy sessions for R  hip pain due to arthritic changes .  Treatment has included therapeutic exercise, therapeutic activity, and patient education with home exercise program . Progress to physical therapy goals is fair. Patient had recent cortisone injection which did help somewhat improve her R hip pain, but there is still sharp pain with any sort of bending/twisting. She is still unable to easily don shoes/socks. She can tolerate walking longer distances but has to ambulate with a slower gait speed.  She will benefit from continued skilled physical therapy to address remaining impairments and functional limitations.       Goals  Plan Goals: STGs to be completed within 30 days:  -Patient will demonstrate compliance and independence with initial HEP (MET)  -Patient will increase R hip flexion AROM to 95 degrees or more to reduce pain when bending forward or getting in/out of car (MET)  -Patient will perform sit to stand transfer with equilateral WB and no UE assistance (ONGOING)        LTGs to be completed within 90 days:  -Patient will increase R Hip ER AROM to 30 degrees or more to  allow patient to don shoes and socks (PARTIALLY MET- improved ROM but still painful and difficult to don shoes/socks)  -Patient will complete community mobility >30 min without increased hip pain to allow patient to walk her dog regularly. (MET- as long as she walks slowly)  -Patient will improve score on LEFS from 24 at eval to 40 or greater to improve quality of life (PROGRESSING)    Recommendations: Continue as planned  Timeframe: 1 month; 1-2x/week  Prognosis to achieve goals: good    PT Signature: STEVE Augustine License Number: [id number]    Electronically signed by Staci Salgado PT, 11/11/24, 9:15 AM EST      Based upon review of the patient's progress and continued therapy plan, it is my medical opinion that Marcia Shi should continue physical therapy treatment at [facility]  [address] [place] [address]  [facility] [address]  [phone].    Signature: __________________________________  Iesha Britt MD    Timed:  Manual Therapy:         mins  54656;  Therapeutic Exercise:    20     mins  43689;     Neuromuscular Nicholas:        mins  70777;    Therapeutic Activity:     10     mins  57060;     Gait Training:           mins  98932;     Ultrasound:          mins  03099;    Iontophoresis         mins 70057;    Untimed:  Electrical Stimulation:    15     mins  52516 ( );  Traction:         mins  05903;   Dry Needling   (1-2 muscles)       mins 20560 (Self-pay)  Dry Needling (3-4 muscles)        mins 20561 (Self-pay)  Dry Needling Trial          mins DRYNDLTRIAL  (No Charge)    Timed Treatment:   30   mins   Total Treatment:     50   mins

## 2024-11-12 ENCOUNTER — OFFICE VISIT (OUTPATIENT)
Dept: ORTHOPEDIC SURGERY | Facility: CLINIC | Age: 49
End: 2024-11-12
Payer: COMMERCIAL

## 2024-11-12 VITALS — BODY MASS INDEX: 33.62 KG/M2 | HEIGHT: 69 IN | WEIGHT: 227 LBS

## 2024-11-12 DIAGNOSIS — M16.11 PRIMARY OSTEOARTHRITIS OF RIGHT HIP: Primary | ICD-10-CM

## 2024-11-12 PROCEDURE — 99213 OFFICE O/P EST LOW 20 MIN: CPT | Performed by: NURSE PRACTITIONER

## 2024-11-12 NOTE — PROGRESS NOTES
Subjective:     Patient ID: Marcia Shi is a 49 y.o. female.    Chief Complaint:  Follow-up DJD right hip  Fluoroscopy guided injection 2024 right hip  History of Present Illness  History of Present Illness  The patient returns to the clinic today for evaluation of her right hip. She is accompanied by her spouse.    She received an injection under imaging on 2024, which resulted in mild symptom improvement. Despite continuing with physical therapy, she has not experienced significant pain relief or improvement in range of motion. She struggles with daily activities such as putting on socks due to the pain. She rates her discomfort between 5 and 7 out of 10.     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 She was given a 3-week break from work to see if this would alleviate her symptoms, but the pain persists, radiating down into her groin. She experiences significant pain when bending over.    She has previously tried meloxicam and glucosamine, but discontinued both due to side effects and lack of improvement. Currently, she is not on any medication. Tests for autoimmune conditions have returned normal results. She has no other concerns at present.    Social History     Occupational History    Not on file   Tobacco Use    Smoking status: Former     Current packs/day: 0.00     Average packs/day: 0.5 packs/day for 10.0 years (5.0 ttl pk-yrs)     Types: Cigarettes     Start date: 1/3/1995     Quit date: 1/3/2005     Years since quittin.8     Passive exposure: Past    Smokeless tobacco: Never    Tobacco comments:     quit 15 yrs ago   Vaping Use    Vaping status: Never Used   Substance and Sexual Activity    Alcohol use: Yes     Comment: occa    Drug use: Never    Sexual activity: Yes     Partners: Male     Birth control/protection: Condom      Past Medical History:   Diagnosis Date    Anxiety     Chronic pain disorder     Depression  "2008    Ganglion cyst of wrist     Hip arthrosis     Scoliosis     UTI (urinary tract infection)     Vitamin D deficiency      Past Surgical History:   Procedure Laterality Date     SECTION      COLONOSCOPY N/A 2021    Procedure: COLONOSCOPY with polypectomy;  Surgeon: Eleuterio Crawford MD;  [place] [facility] [place];  Service: Gastroenterology;  Laterality: N/A;  polyps, diverticulosis    COLONOSCOPY W/ POLYPECTOMY N/A 2022    Procedure: COLONOSCOPY WITH POLYPECTOMY;  Surgeon: Eleuterio Crawford MD;  Location: [facility];  Service: Gastroenterology;  Laterality: N/A;  POLYPS X4    NASAL SEPTUM SURGERY      TONSILLECTOMY         Family History   Problem Relation Age of Onset    Hypertension Mother     Cervical cancer Mother 79    Dementia Mother     Depression Mother     Cancer Mother     Colon cancer Father 70    Lymphoma Father     Cancer Father     Breast cancer Neg Hx     Ovarian cancer Neg Hx     Uterine cancer Neg Hx     Deep vein thrombosis Neg Hx     Pulmonary embolism Neg Hx                Objective:  Physical Exam    General: No acute distress.  Eyes: conjunctiva clear; pupils equally round and reactive  ENT: external ears and nose atraumatic; oropharynx clear  CV: no peripheral edema  Resp: normal respiratory effort  Skin: no rashes or wounds; normal turgor  Psych: mood and affect appropriate; recent and remote memory intact    Vitals:    24 0811   Weight: 103 kg (227 lb)   Height: 175.3 cm (69.02\")         24  0811   Weight: 103 kg (227 lb)     Body mass index is 33.5 kg/m².      Right Hip Exam     Tenderness   The patient is experiencing tenderness in the anterior and greater trochanter.    Range of Motion   Abduction:  40   Adduction:  20   Extension:  0   Flexion:  100   External rotation:  30   Internal rotation:  25     Muscle Strength   Abduction: 4/5   Adduction: 4/5   Flexion: 4/5     Tests   ADILIA: positive  Fadir:  Positive FADIR test    Other   Erythema: " absent  Sensation: normal  Pulse: present    Comments:  Positive logroll exam  Positive Stinchfield exam             Physical Exam        Assessment:        1. Primary osteoarthritis of right hip         Assessment & Plan  1. Right hip pain.  She received an injection under imaging on 11/6/2024 with mild symptom improvement. Continued physical therapy has not provided significant symptom resolution for pain, and there is minimal improvement in range of motion. She experiences significant difficulty with activities of daily living, such as donning socks. Previously tried meloxicam and glucosamine but discontinued due to side effects. She rates her discomfort between 5 and 7 out of 10. Pain radiates into the groin and is exacerbated by standing for long periods and bending over. Testing for autoimmune conditions returned normal results. Recommended a possible seated duty when she returns to work due to increased pain with weightbearing activity. Will keep her off work for the remaining timeframe under [place] per insurance request. She wishes to proceed with a referral to Dr. James for surgical discussion. Continue with physical therapy. Plan to see her back in the clinic as needed.      Orders:  Orders Placed This Encounter   Procedures    Ambulatory Referral to Orthopedic Surgery     No orders of the defined types were placed in this encounter.        Dragon dictation utilized          Patient or patient representative verbalized consent for the use of Ambient Listening during the visit with  NICOLE Paniagua for chart documentation. 11/12/2024  09:06 EST

## 2024-11-18 ENCOUNTER — TREATMENT (OUTPATIENT)
Dept: PHYSICAL THERAPY | Facility: CLINIC | Age: 49
End: 2024-11-18
Payer: COMMERCIAL

## 2024-11-18 DIAGNOSIS — M25.551 RIGHT HIP PAIN: Primary | ICD-10-CM

## 2024-11-18 DIAGNOSIS — R26.2 DIFFICULTY WALKING: ICD-10-CM

## 2024-11-18 PROCEDURE — 97110 THERAPEUTIC EXERCISES: CPT | Performed by: PHYSICAL THERAPIST

## 2024-11-18 PROCEDURE — 97530 THERAPEUTIC ACTIVITIES: CPT | Performed by: PHYSICAL THERAPIST

## 2024-11-18 PROCEDURE — 97014 ELECTRIC STIMULATION THERAPY: CPT | Performed by: PHYSICAL THERAPIST

## 2024-11-18 NOTE — PROGRESS NOTES
Physical Therapy Daily Treatment Note/DISCHARGE    [facility]  [address] [place], [place] [address] [phone] (phone)  [phone] (fax)    Patient: Marcia Shi   : 1975  Diagnosis/ICD-10 Code:  Right hip pain [M25.551]  Referring practitioner: Iesha Britt MD  Date of Initial Visit: Type: THERAPY  Noted: 10/14/2024  Today's Date:  2024  Patient seen for 6 sessions           Subjective   Patient reports her hip is doing better. Injection seems to be helping. Still having pain with hip ER    Objective     See Exercise, Manual, and Modality Logs for complete treatment.     Assessment/Plan  Patient had some mild discomfort with piriformis stretch. She also had a lot of groin pain with bent knee fall outs therefore transitioned to a more sustained butterfly stretch which was more tolerable. Added single leg hip abduction strengthening to isolate each side. Ended with ice/IFC for pain relief. Patient plans to eventually undergo NATACHA surgery. Recommend self management of condition until she is post op.        Goals  Plan Goals: STGs to be completed within 30 days:  -Patient will demonstrate compliance and independence with initial HEP (MET)  -Patient will increase R hip flexion AROM to 95 degrees or more to reduce pain when bending forward or getting in/out of car (MET)  -Patient will perform sit to stand transfer with equilateral WB and no UE assistance (NOT MET)        LTGs to be completed within 90 days:  -Patient will increase R Hip ER AROM to 30 degrees or more to  allow patient to don shoes and socks (PARTIALLY MET- improved ROM but still painful and difficult to don shoes/socks)  -Patient will complete community mobility >30 min without increased hip pain to allow patient to walk her dog regularly. (MET- as long as she walks slowly)  -Patient will improve score on LEFS from 24 at eval to 40 or greater to improve quality of life (NOT MET)    Timed:    Manual  Therapy:         mins  43507;  Therapeutic Exercise:    20     mins  31850;     Neuromuscular Nicholas:        mins  38049;    Therapeutic Activity:     10     mins  19864;     Gait Training:           mins  19078;     Ultrasound:          mins  41376;    Electrical Stimulation:         mins  66637 ( );  Iontophoresis         mins 48783;  Aquatic Therapy         mins 91642;    Untimed:  Electrical Stimulation:    15     mins  08919 ( );  Traction:         mins  42680;   Dry Needling   (1-2 muscles)       mins 20560 (Self-pay)  Dry Needling (3-4 muscles)        mins 20561 (Self-pay)  Dry Needling Trial          mins DRYNDLTRIAL  (No Charge)    Timed Treatment:   30   mins   Total Treatment:     55   mins    Staci Salgado PT  Physical Therapist    [place] License:[id number]

## 2024-11-25 ENCOUNTER — TELEMEDICINE (OUTPATIENT)
Dept: PSYCHIATRY | Facility: CLINIC | Age: 49
End: 2024-11-25
Payer: COMMERCIAL

## 2024-11-25 DIAGNOSIS — F33.0 MILD EPISODE OF RECURRENT MAJOR DEPRESSIVE DISORDER: ICD-10-CM

## 2024-11-25 DIAGNOSIS — F41.1 GENERALIZED ANXIETY DISORDER: ICD-10-CM

## 2024-11-25 DIAGNOSIS — G47.9 SLEEP DIFFICULTIES: ICD-10-CM

## 2024-11-25 PROBLEM — E78.5 HYPERLIPIDEMIA: Status: ACTIVE | Noted: 2024-09-30

## 2024-11-25 PROBLEM — R73.03 PREDIABETES: Status: ACTIVE | Noted: 2024-09-28

## 2024-11-25 RX ORDER — BUPROPION HYDROCHLORIDE 300 MG/1
300 TABLET ORAL DAILY
Qty: 90 TABLET | Refills: 0 | Status: SHIPPED | OUTPATIENT
Start: 2024-11-25

## 2024-11-25 RX ORDER — BUSPIRONE HYDROCHLORIDE 7.5 MG/1
7.5 TABLET ORAL 3 TIMES DAILY
Qty: 90 TABLET | Refills: 2 | Status: SHIPPED | OUTPATIENT
Start: 2024-11-25

## 2024-11-25 RX ORDER — TRAZODONE HYDROCHLORIDE 50 MG/1
50 TABLET, FILM COATED ORAL NIGHTLY
Qty: 90 TABLET | Refills: 1 | Status: SHIPPED | OUTPATIENT
Start: 2024-11-25

## 2024-11-25 NOTE — PROGRESS NOTES
This provider is located at [facility], [address] [place] [address].The Patient is seen remotely at home, [address] [facility] [place] [address] via [employer].  Patient is being seen via telehealth and confirm that they are in a secure environment for this session. The patient's condition being diagnosed/treated is appropriate for telemedicine. The provider identified himself/herself: herself as well as her credentials.   The patient gave consent to be seen remotely, and when consent is given they understand that the consent allows for patient identifiable information to be sent to a third party as needed.   They may refuse to be seen remotely at any time. The electronic data is encrypted and password protected, and the patient has been advised of the potential risks to privacy not withstanding such measures.    You have chosen to receive care through a telehealth visit.  Do you consent to use a video/audio connection for your medical care today? Yes. Patient verified Name, , and address.       Chief Complaint  Anxiety and depression     Subjective    Marcia Shi presents to [facility] for medication management.     History of Present Illness  Patient presents today reporting that she is doing okay.  She states she goes back to work next week and she is anxious regarding her overall health issues.  She states she has not had any pain in 2 weeks and still doing physical therapy but is worried about the pain when she returns back to work.  Patient denied any depressive symptoms or feeling hopeless or helpless.  States her only anxiety is regarding the work situation.  Reports she is sleeping and eating well.  Denies any panic attacks.  Denies any side effects to medications.  Patient states overall she is doing well with the medications and no concerns.  Denies any SI/HI/AH/VH.      Objective   Vital Signs:   There were no vitals taken for this  visit.  Due to the remote nature of this encounter (virtual encounter), vitals were unable to be obtained.  Height stated at 69 inches.  Weight stated at 238 pounds.      PHQ-9 Score:   PHQ-9 Total Score:(Patient-Rptd) 4     PHQ-9 Depression Screening  Little interest or pleasure in doing things? (Patient-Rptd) Several days   Feeling down, depressed, or hopeless? (Patient-Rptd) Not at all   PHQ-2 Total Score (Patient-Rptd) 1   Trouble falling or staying asleep, or sleeping too much? (Patient-Rptd) Not at all   Feeling tired or having little energy? (Patient-Rptd) Over half   Poor appetite or overeating? (Patient-Rptd) Not at all   Feeling bad about yourself - or that you are a failure or have let yourself or your family down? (Patient-Rptd) Not at all   Trouble concentrating on things, such as reading the newspaper or watching television? (Patient-Rptd) Several days   Moving or speaking so slowly that other people could have noticed? Or the opposite - being so fidgety or restless that you have been moving around a lot more than usual? (Patient-Rptd) Not at all   Thoughts that you would be better off dead, or of hurting yourself in some way? (Patient-Rptd) Not at all   PHQ-9 Total Score (Patient-Rptd) 4   If you checked off any problems, how difficult have these problems made it for you to do your work, take care of things at home, or get along with other people? (Patient-Rptd) Somewhat difficult         PHQ-9 Total Score: (Patient-Rptd) 4     NICKY-7  Feeling nervous, anxious or on edge: (Patient-Rptd) Several days  Not being able to stop or control worrying: (Patient-Rptd) Not at all  Worrying too much about different things: (Patient-Rptd) Several days  Trouble Relaxing: (Patient-Rptd) Several days  Being so restless that it is hard to sit still: (Patient-Rptd) Not at all  Feeling afraid as if something awful might happen: (Patient-Rptd) Not at all  Becoming easily annoyed or irritable: (Patient-Rptd) Not at  all  NICKY 7 Total Score: (Patient-Rptd) 3  If you checked any problems, how difficult have these problems made it for you to do your work, take care of things at home, or get along with other people: (Patient-Rptd) Somewhat difficult      Patient screened positive for depression based on a PHQ-9 score of 4 on 11/25/2024. Follow-up recommendations include: Prescribed antidepressant medication treatment.        Mental Status Exam:   Hygiene:   good  Cooperation:  Cooperative  Eye Contact:  Good  Psychomotor Behavior:  Appropriate  Affect:  Appropriate  Mood: sad and anxious  Speech:  Normal  Thought Process:  Goal directed  Thought Content:  Normal  Suicidal:  None  Homicidal:  None  Hallucinations:  None  Delusion:  None  Memory:  Intact  Orientation:  Person, Place, Time, and Situation  Reliability:  good  Insight:  Good  Judgement:  Good  Impulse Control:  Good  Physical/Medical Issues:  Yes see medical hx      Current Medications:   Current Outpatient Medications   Medication Sig Dispense Refill    buPROPion XL (Wellbutrin XL) 300 MG 24 hr tablet Take 1 tablet by mouth Daily. 90 tablet 0    busPIRone (BUSPAR) 7.5 MG tablet Take 1 tablet by mouth 3 (Three) Times a Day. 90 tablet 2    traZODone (DESYREL) 50 MG tablet Take 1 tablet by mouth Every Night. 90 tablet 1    cetirizine (ZyrTEC) 10 MG tablet Take 1 tablet by mouth Daily.      cholecalciferol (VITAMIN D3) 1000 UNITS tablet Take by mouth.      CRANBERRY PO Take by mouth.      Melatonin 5 MG tablet dispersible Take 2 tablets by mouth.      meloxicam (Mobic) 15 MG tablet Take 1 tablet by mouth Daily. (Patient not taking: Reported on 11/12/2024) 21 tablet 0    Multiple Vitamin (MULTIVITAMIN) capsule Take by mouth.       No current facility-administered medications for this visit.       Physical Exam  Nursing note reviewed.   Constitutional:       Appearance: Normal appearance.   Neurological:      Mental Status: She is alert.   Psychiatric:         Attention and  Perception: Attention and perception normal.         Mood and Affect: Affect normal. Mood is anxious. Mood is not depressed.         Speech: Speech normal.         Behavior: Behavior is cooperative.         Thought Content: Thought content normal.         Cognition and Memory: Cognition and memory normal.         Judgment: Judgment normal.        Result Review :     The following data was reviewed by: NICOLE Griffin on 10/28/2024:  Common labs          5/23/2024    08:16 9/23/2024    07:53   Common Labs   Glucose 97     BUN 11     Creatinine 0.82     Sodium 137     Potassium 4.4     Chloride 102     Calcium 9.1     Total Protein 6.2     Albumin 4.2     Total Bilirubin 0.5     Alkaline Phosphatase 60     AST (SGOT) 21     ALT (SGPT) 24     WBC 6.57  7.8    Hemoglobin 12.3  13.7    Hematocrit 37.9  43.4    Platelets 323  389    Total Cholesterol 214     Triglycerides 103     HDL Cholesterol 59     LDL Cholesterol  137     Hemoglobin A1C  5.9      CMP          5/23/2024    08:16   CMP   Glucose 97    BUN 11    Creatinine 0.82    Sodium 137    Potassium 4.4    Chloride 102    Calcium 9.1    Total Protein 6.2    Albumin 4.2    Globulin 2.0    Total Bilirubin 0.5    Alkaline Phosphatase 60    AST (SGOT) 21    ALT (SGPT) 24    BUN/Creatinine Ratio 13.4      CBC          5/23/2024    08:16 9/23/2024    07:53   CBC   WBC 6.57  7.8    RBC 4.21  4.84    Hemoglobin 12.3  13.7    Hematocrit 37.9  43.4    MCV 90.0  90    MCH 29.2  28.3    MCHC 32.5  31.6    RDW 13.0  13.6    Platelets 323  389      CBC w/diff          5/23/2024    08:16 9/23/2024    07:53   CBC w/Diff   WBC 6.57  7.8    RBC 4.21  4.84    Hemoglobin 12.3  13.7    Hematocrit 37.9  43.4    MCV 90.0  90    MCH 29.2  28.3    MCHC 32.5  31.6    RDW 13.0  13.6    Platelets 323  389    Neutrophil Rel %  69    Lymphocyte Rel %  21    Monocyte Rel %  7    Eosinophil Rel %  1    Basophil Rel %  1      Lipid Panel          5/23/2024    08:16   Lipid Panel   Total  Cholesterol 214    Triglycerides 103    HDL Cholesterol 59    VLDL Cholesterol 18    LDL Cholesterol  137    LDL/HDL Ratio 2.28      TSH          5/23/2024    08:16 9/23/2024    07:53   TSH   TSH 2.680  3.250      Electrolytes          5/23/2024    08:16   Electrolytes   Sodium 137    Potassium 4.4    Chloride 102    Calcium 9.1      Renal Profile          5/23/2024    08:16   Renal Profile   BUN 11    Creatinine 0.82      BMP          5/23/2024    08:16   BMP   BUN 11    Creatinine 0.82    Sodium 137    Potassium 4.4    Chloride 102    CO2 23.3    Calcium 9.1      Most Recent A1C          9/23/2024    07:53   HGBA1C Most Recent   Hemoglobin A1C 5.9          Data reviewed : PCP notes         Assessment and Plan    Problem List Items Addressed This Visit    None  Visit Diagnoses       Mild episode of recurrent major depressive disorder        Relevant Medications    buPROPion XL (Wellbutrin XL) 300 MG 24 hr tablet    busPIRone (BUSPAR) 7.5 MG tablet    traZODone (DESYREL) 50 MG tablet    Generalized anxiety disorder        Relevant Medications    buPROPion XL (Wellbutrin XL) 300 MG 24 hr tablet    busPIRone (BUSPAR) 7.5 MG tablet    traZODone (DESYREL) 50 MG tablet    Sleep difficulties        Relevant Medications    traZODone (DESYREL) 50 MG tablet              Encounter Diagnoses   Name Primary?    Mild episode of recurrent major depressive disorder     Generalized anxiety disorder     Sleep difficulties             TREATMENT PLAN/GOALS: Continue supportive psychotherapy efforts and medications as indicated. Treatment and medication options discussed during today's visit. Patient ackowledged and verbally consented to continue with current treatment plan and was educated on the importance of compliance with treatment and follow-up appointments.    MEDICATION ISSUES:  We discussed risks, benefits, and side effects of the above medications and the patient was agreeable with the plan. Patient was educated on the  importance of compliance with treatment and follow-up appointments.  Patient is agreeable to call the office with any worsening of symptoms or onset of side effects. Patient is agreeable to call 911 or go to the nearest ER should he/she begin having SI/HI.     -Continue BuSpar 7.5 mg 3 times daily for anxiety.  Highly encouraged her if she had any worsening symptoms or concerns contact clinic she verbalized understanding.  -Continue Wellbutrin 300 mg XL daily as adjunct for depression.  -Continue trazodone 50 mg at night as needed for sleep.  -Highly encouraged patient if she had any concerns or worsening symptoms to contact the clinic for sooner appointment she verbalized understanding.     Counseled patient regarding multimodal approach with healthy nutrition, healthy sleep, regular physical activity, social activities, counseling, and medications.      Coping skills reviewed and encouraged positive framing of thoughts     Assisted patient in processing above session content; acknowledged and normalized patient’s thoughts, feelings, and concerns.  Applied  positive coping skills and behavior management in session.  Allowed patient to freely discuss issues without interruption or judgment. Provided safe, confidential environment to facilitate the development of positive therapeutic relationship and encourage open, honest communication. Assisted patient in identifying risk factors which would indicate the need for higher level of care including thoughts to harm self or others and/or self-harming behavior and encouraged patient to contact this office, call 911, or present to the nearest emergency room should any of these events occur. Discussed crisis intervention services and means to access.     MEDS ORDERED DURING VISIT:  New Medications Ordered This Visit   Medications    buPROPion XL (Wellbutrin XL) 300 MG 24 hr tablet     Sig: Take 1 tablet by mouth Daily.     Dispense:  90 tablet     Refill:  0    busPIRone  (BUSPAR) 7.5 MG tablet     Sig: Take 1 tablet by mouth 3 (Three) Times a Day.     Dispense:  90 tablet     Refill:  2    traZODone (DESYREL) 50 MG tablet     Sig: Take 1 tablet by mouth Every Night.     Dispense:  90 tablet     Refill:  1           Follow Up   Return in about 6 weeks (around 1/6/2025), or if symptoms worsen or fail to improve, for Recheck.    Patient was given instructions and counseling regarding her condition or for health maintenance advice. Please see specific information pulled into the AVS if appropriate.     Some of the data in this electronic note has been brought forward from a previous encounter, any necessary changes have been made, it has been reviewed by this APRN, and it is accurate.      This document has been electronically signed by NICOLE Griffin  November 25, 2024 10:50 EST    Part of this note may be an electronic transcription/translation of spoken language to printed text using the Dragon Dictation System.

## 2025-01-13 ENCOUNTER — TELEMEDICINE (OUTPATIENT)
Dept: PSYCHIATRY | Facility: CLINIC | Age: 50
End: 2025-01-13
Payer: COMMERCIAL

## 2025-01-13 DIAGNOSIS — F33.0 MILD EPISODE OF RECURRENT MAJOR DEPRESSIVE DISORDER: ICD-10-CM

## 2025-01-13 DIAGNOSIS — G47.9 SLEEP DIFFICULTIES: ICD-10-CM

## 2025-01-13 DIAGNOSIS — F41.1 GENERALIZED ANXIETY DISORDER: ICD-10-CM

## 2025-01-13 PROCEDURE — 99214 OFFICE O/P EST MOD 30 MIN: CPT | Performed by: NURSE PRACTITIONER

## 2025-01-13 PROCEDURE — 96127 BRIEF EMOTIONAL/BEHAV ASSMT: CPT | Performed by: NURSE PRACTITIONER

## 2025-01-13 RX ORDER — BUPROPION HYDROCHLORIDE 300 MG/1
300 TABLET ORAL DAILY
Qty: 90 TABLET | Refills: 0 | Status: SHIPPED | OUTPATIENT
Start: 2025-01-13

## 2025-01-13 RX ORDER — BUSPIRONE HYDROCHLORIDE 7.5 MG/1
7.5 TABLET ORAL 3 TIMES DAILY
Qty: 90 TABLET | Refills: 2 | Status: SHIPPED | OUTPATIENT
Start: 2025-01-13

## 2025-01-13 RX ORDER — TRAZODONE HYDROCHLORIDE 50 MG/1
50 TABLET, FILM COATED ORAL NIGHTLY
Qty: 90 TABLET | Refills: 1 | Status: SHIPPED | OUTPATIENT
Start: 2025-01-13

## 2025-01-13 NOTE — PROGRESS NOTES
This provider is located at [facility], [address] [place] [address].The Patient is seen remotely at home, [address] [facility] [place] [address] via [employer].  Patient is being seen via telehealth and confirm that they are in a secure environment for this session. The patient's condition being diagnosed/treated is appropriate for telemedicine. The provider identified himself/herself: herself as well as her credentials.   The patient gave consent to be seen remotely, and when consent is given they understand that the consent allows for patient identifiable information to be sent to a third party as needed.   They may refuse to be seen remotely at any time. The electronic data is encrypted and password protected, and the patient has been advised of the potential risks to privacy not withstanding such measures.    You have chosen to receive care through a telehealth visit.  Do you consent to use a video/audio connection for your medical care today? Yes. Patient verified Name, , and address.   No changes noted    Chief Complaint  Anxiety and depression     Subjective    Marcia Shi presents to [facility] for medication management.     History of Present Illness  Patient presents today reporting that she is doing good.  Patient states that her mood has been okay and denies any depressive sadness or hopeless or helpless feelings.  Patient states her sleep and appetite are good.  She notes she is using her trazodone and melatonin which worked for her.  Patient states she is in the BuSpar 3 times a day has helped calm her down and her anxiety has been good.  Patient reports she has been back to work doing well.  Denies any new medical or medicine changes.  Denies any side effects to medications.  Denies any SI/HI/AH/VH.      Objective   Vital Signs:   There were no vitals taken for this visit.  Due to the remote nature of this encounter (virtual  encounter), vitals were unable to be obtained.  Height stated at 69 inches.  Weight stated at 238 pounds.      PHQ-9 Score:   PHQ-9 Total Score:(Patient-Rptd) 3     PHQ-9 Depression Screening  Little interest or pleasure in doing things? (Patient-Rptd) Several days   Feeling down, depressed, or hopeless? (Patient-Rptd) Not at all   PHQ-2 Total Score (Patient-Rptd) 1   Trouble falling or staying asleep, or sleeping too much? (Patient-Rptd) Not at all   Feeling tired or having little energy? (Patient-Rptd) Several days   Poor appetite or overeating? (Patient-Rptd) Not at all   Feeling bad about yourself - or that you are a failure or have let yourself or your family down? (Patient-Rptd) Not at all   Trouble concentrating on things, such as reading the newspaper or watching television? (Patient-Rptd) Several days   Moving or speaking so slowly that other people could have noticed? Or the opposite - being so fidgety or restless that you have been moving around a lot more than usual? (Patient-Rptd) Not at all   Thoughts that you would be better off dead, or of hurting yourself in some way? (Patient-Rptd) Not at all   PHQ-9 Total Score (Patient-Rptd) 3   If you checked off any problems, how difficult have these problems made it for you to do your work, take care of things at home, or get along with other people? (Patient-Rptd) Somewhat difficult         PHQ-9 Total Score: (Patient-Rptd) 3     NICKY-7  Feeling nervous, anxious or on edge: (Patient-Rptd) Several days  Not being able to stop or control worrying: (Patient-Rptd) Not at all  Worrying too much about different things: (Patient-Rptd) Not at all  Trouble Relaxing: (Patient-Rptd) Several days  Being so restless that it is hard to sit still: (Patient-Rptd) Not at all  Feeling afraid as if something awful might happen: (Patient-Rptd) Not at all  Becoming easily annoyed or irritable: (Patient-Rptd) Not at all  NICKY 7 Total Score: (Patient-Rptd) 2  If you checked any  problems, how difficult have these problems made it for you to do your work, take care of things at home, or get along with other people: (Patient-Rptd) Not difficult at all      Patient screened positive for depression based on a PHQ-9 score of 3 on 1/13/2025. Follow-up recommendations include: Prescribed antidepressant medication treatment.        Mental Status Exam:   Hygiene:   good  Cooperation:  Cooperative  Eye Contact:  Good  Psychomotor Behavior:  Appropriate  Affect:  Appropriate  Mood: normal  Speech:  Normal  Thought Process:  Goal directed  Thought Content:  Normal  Suicidal:  None  Homicidal:  None  Hallucinations:  None  Delusion:  None  Memory:  Intact  Orientation:  Person, Place, Time, and Situation  Reliability:  good  Insight:  Good  Judgement:  Good  Impulse Control:  Good  Physical/Medical Issues:  Yes see medical hx      Current Medications:   Current Outpatient Medications   Medication Sig Dispense Refill    buPROPion XL (Wellbutrin XL) 300 MG 24 hr tablet Take 1 tablet by mouth Daily. 90 tablet 0    busPIRone (BUSPAR) 7.5 MG tablet Take 1 tablet by mouth 3 (Three) Times a Day. 90 tablet 2    traZODone (DESYREL) 50 MG tablet Take 1 tablet by mouth Every Night. 90 tablet 1    cetirizine (ZyrTEC) 10 MG tablet Take 1 tablet by mouth Daily.      cholecalciferol (VITAMIN D3) 1000 UNITS tablet Take by mouth.      CRANBERRY PO Take by mouth.      Melatonin 5 MG tablet dispersible Take 2 tablets by mouth.      meloxicam (Mobic) 15 MG tablet Take 1 tablet by mouth Daily. (Patient not taking: Reported on 11/12/2024) 21 tablet 0    Multiple Vitamin (MULTIVITAMIN) capsule Take by mouth.       No current facility-administered medications for this visit.       Physical Exam  Nursing note reviewed.   Constitutional:       Appearance: Normal appearance.   Neurological:      Mental Status: She is alert.   Psychiatric:         Attention and Perception: Attention and perception normal.         Mood and Affect:  Mood and affect normal. Mood is not anxious or depressed.         Speech: Speech normal.         Behavior: Behavior is cooperative.         Thought Content: Thought content normal.         Cognition and Memory: Cognition and memory normal.         Judgment: Judgment normal.        Result Review :     The following data was reviewed by: NICOLE Griffin on 10/28/2024:  Common labs          5/23/2024    08:16 9/23/2024    07:53 12/13/2024    07:48   Common Labs   Glucose 97   92    BUN 11   14    Creatinine 0.82   0.96    Sodium 137   139    Potassium 4.4   4.0    Chloride 102   103    Calcium 9.1   9.0    Total Protein 6.2      Albumin 4.2      Total Bilirubin 0.5      Alkaline Phosphatase 60      AST (SGOT) 21      ALT (SGPT) 24      WBC 6.57  7.8     Hemoglobin 12.3  13.7     Hematocrit 37.9  43.4     Platelets 323  389     Total Cholesterol 214      Triglycerides 103      HDL Cholesterol 59      LDL Cholesterol  137      Hemoglobin A1C  5.9  5.10      CMP          5/23/2024    08:16 12/13/2024    07:48   CMP   Glucose 97  92    BUN 11  14    Creatinine 0.82  0.96    Sodium 137  139    Potassium 4.4  4.0    Chloride 102  103    Calcium 9.1  9.0    Total Protein 6.2     Albumin 4.2     Globulin 2.0     Total Bilirubin 0.5     Alkaline Phosphatase 60     AST (SGOT) 21     ALT (SGPT) 24     BUN/Creatinine Ratio 13.4  14.6      CBC          5/23/2024    08:16 9/23/2024    07:53   CBC   WBC 6.57  7.8    RBC 4.21  4.84    Hemoglobin 12.3  13.7    Hematocrit 37.9  43.4    MCV 90.0  90    MCH 29.2  28.3    MCHC 32.5  31.6    RDW 13.0  13.6    Platelets 323  389      CBC w/diff          5/23/2024    08:16 9/23/2024    07:53   CBC w/Diff   WBC 6.57  7.8    RBC 4.21  4.84    Hemoglobin 12.3  13.7    Hematocrit 37.9  43.4    MCV 90.0  90    MCH 29.2  28.3    MCHC 32.5  31.6    RDW 13.0  13.6    Platelets 323  389    Neutrophil Rel %  69    Lymphocyte Rel %  21    Monocyte Rel %  7    Eosinophil Rel %  1    Basophil Rel %   1      Lipid Panel          5/23/2024    08:16   Lipid Panel   Total Cholesterol 214    Triglycerides 103    HDL Cholesterol 59    VLDL Cholesterol 18    LDL Cholesterol  137    LDL/HDL Ratio 2.28      TSH          5/23/2024    08:16 9/23/2024    07:53   TSH   TSH 2.680  3.250      Electrolytes          5/23/2024    08:16 12/13/2024    07:48   Electrolytes   Sodium 137  139    Potassium 4.4  4.0    Chloride 102  103    Calcium 9.1  9.0      Renal Profile          5/23/2024    08:16 12/13/2024    07:48   Renal Profile   BUN 11  14    Creatinine 0.82  0.96      BMP          5/23/2024    08:16 12/13/2024    07:48   BMP   BUN 11  14    Creatinine 0.82  0.96    Sodium 137  139    Potassium 4.4  4.0    Chloride 102  103    CO2 23.3  25.2    Calcium 9.1  9.0      Most Recent A1C          12/13/2024    07:48   HGBA1C Most Recent   Hemoglobin A1C 5.10          Data reviewed : PCP notes         Assessment and Plan    Problem List Items Addressed This Visit    None  Visit Diagnoses       Mild episode of recurrent major depressive disorder        Relevant Medications    buPROPion XL (Wellbutrin XL) 300 MG 24 hr tablet    busPIRone (BUSPAR) 7.5 MG tablet    traZODone (DESYREL) 50 MG tablet    Generalized anxiety disorder        Relevant Medications    buPROPion XL (Wellbutrin XL) 300 MG 24 hr tablet    busPIRone (BUSPAR) 7.5 MG tablet    traZODone (DESYREL) 50 MG tablet    Sleep difficulties        Relevant Medications    traZODone (DESYREL) 50 MG tablet                Encounter Diagnoses   Name Primary?    Mild episode of recurrent major depressive disorder     Generalized anxiety disorder     Sleep difficulties               TREATMENT PLAN/GOALS: Continue supportive psychotherapy efforts and medications as indicated. Treatment and medication options discussed during today's visit. Patient ackowledged and verbally consented to continue with current treatment plan and was educated on the importance of compliance with treatment  and follow-up appointments.    MEDICATION ISSUES:  We discussed risks, benefits, and side effects of the above medications and the patient was agreeable with the plan. Patient was educated on the importance of compliance with treatment and follow-up appointments.  Patient is agreeable to call the office with any worsening of symptoms or onset of side effects. Patient is agreeable to call 911 or go to the nearest ER should he/she begin having SI/HI.     -Continue BuSpar 7.5 mg 3 times daily for anxiety.  Highly encouraged her if she had any worsening symptoms or concerns contact clinic she verbalized understanding.  -Continue Wellbutrin 300 mg XL daily as adjunct for depression.  -Continue trazodone 50 mg at night as needed for sleep.  -Highly encouraged patient if she had any concerns or worsening symptoms to contact the clinic for sooner appointment she verbalized understanding.  -We will follow-up in 3 months per patient's request since doing well.  Patient inquired about tapering encouraged at least 1 year of being on the medication at the correct dose for most effectiveness and then would look at tapering but we will continue monitoring patient was agreeable and verbalized understanding.     Counseled patient regarding multimodal approach with healthy nutrition, healthy sleep, regular physical activity, social activities, counseling, and medications.      Coping skills reviewed and encouraged positive framing of thoughts     Assisted patient in processing above session content; acknowledged and normalized patient’s thoughts, feelings, and concerns.  Applied  positive coping skills and behavior management in session.  Allowed patient to freely discuss issues without interruption or judgment. Provided safe, confidential environment to facilitate the development of positive therapeutic relationship and encourage open, honest communication. Assisted patient in identifying risk factors which would indicate the need  for higher level of care including thoughts to harm self or others and/or self-harming behavior and encouraged patient to contact this office, call 911, or present to the nearest emergency room should any of these events occur. Discussed crisis intervention services and means to access.     MEDS ORDERED DURING VISIT:  New Medications Ordered This Visit   Medications    buPROPion XL (Wellbutrin XL) 300 MG 24 hr tablet     Sig: Take 1 tablet by mouth Daily.     Dispense:  90 tablet     Refill:  0    busPIRone (BUSPAR) 7.5 MG tablet     Sig: Take 1 tablet by mouth 3 (Three) Times a Day.     Dispense:  90 tablet     Refill:  2    traZODone (DESYREL) 50 MG tablet     Sig: Take 1 tablet by mouth Every Night.     Dispense:  90 tablet     Refill:  1           Follow Up   Return in about 3 months (around 4/13/2025), or if symptoms worsen or fail to improve, for Recheck.    Patient was given instructions and counseling regarding her condition or for health maintenance advice. Please see specific information pulled into the AVS if appropriate.     Some of the data in this electronic note has been brought forward from a previous encounter, any necessary changes have been made, it has been reviewed by this APRN, and it is accurate.      This document has been electronically signed by NICOLE Griffin  January 13, 2025 10:36 EST    Part of this note may be an electronic transcription/translation of spoken language to printed text using the Dragon Dictation System.

## 2025-04-14 ENCOUNTER — TELEPHONE (OUTPATIENT)
Dept: PSYCHIATRY | Facility: CLINIC | Age: 50
End: 2025-04-14

## 2025-04-14 NOTE — TELEPHONE ENCOUNTER
ETHAN and sent my chart message requesting patient to call the office to reschedule appointment due to provider is out of office today.

## 2025-04-16 ENCOUNTER — TELEMEDICINE (OUTPATIENT)
Dept: PSYCHIATRY | Facility: CLINIC | Age: 50
End: 2025-04-16
Payer: COMMERCIAL

## 2025-04-16 DIAGNOSIS — F41.1 GENERALIZED ANXIETY DISORDER: Chronic | ICD-10-CM

## 2025-04-16 DIAGNOSIS — G47.9 SLEEP DIFFICULTIES: ICD-10-CM

## 2025-04-16 DIAGNOSIS — F33.41 RECURRENT MAJOR DEPRESSIVE DISORDER, IN PARTIAL REMISSION: Chronic | ICD-10-CM

## 2025-04-16 RX ORDER — DICLOFENAC SODIUM 75 MG/1
75 TABLET, DELAYED RELEASE ORAL
COMMUNITY
Start: 2025-01-27

## 2025-04-16 RX ORDER — BUSPIRONE HYDROCHLORIDE 5 MG/1
5 TABLET ORAL 3 TIMES DAILY
Qty: 270 TABLET | Refills: 0 | Status: SHIPPED | OUTPATIENT
Start: 2025-04-16

## 2025-04-16 RX ORDER — TRAZODONE HYDROCHLORIDE 50 MG/1
25-50 TABLET ORAL NIGHTLY
Qty: 90 TABLET | Refills: 1 | Status: SHIPPED | OUTPATIENT
Start: 2025-04-16

## 2025-04-16 RX ORDER — BUPROPION HYDROCHLORIDE 300 MG/1
300 TABLET ORAL DAILY
Qty: 90 TABLET | Refills: 0 | Status: SHIPPED | OUTPATIENT
Start: 2025-04-16

## 2025-04-16 NOTE — PROGRESS NOTES
[facility] provider is located [facility], [address] [place] [address].The Patient is seen remotely at home, [address] [place] [address] via [employer].  Patient is being seen via telehealth and confirm that they are in a secure environment for this session. The patient's condition being diagnosed/treated is appropriate for telemedicine. The provider identified himself/herself: herself as well as her credentials.   The patient gave consent to be seen remotely, and when consent is given they understand that the consent allows for patient identifiable information to be sent to a third party as needed.   They may refuse to be seen remotely at any time. The electronic data is encrypted and password protected, and the patient has been advised of the potential risks to privacy not withstanding such measures.    You have chosen to receive care through a telehealth visit.  Do you consent to use a video/audio connection for your medical care today? Yes. Patient verified Name, , and address.  No changes noted      Chief Complaint  Anxiety and depression     Subjective    Marcia Shi presents to [facility] for medication management.     History of Present Illness  Patient presents today reporting that things are going very well.  She states work is going good.  Notes her mood overall has been stable.  Denies any depressive symptoms or feeling hopeless or helpless.  Patient states that she is sleeping and eating well and even feels she can lower the trazodone dose.  Encouraged her she can break in half.  Patient states her anxiety is much better and denies any panic attacks.  Rates anxiety and depression as 0-2 out of 10 with 10 being the worst.  Patient feels overall she is managing well and is able to read and concentrate and not had any concerns.  Denies any side effects to medications.  Denies any SI/HI/AH/VH.      Objective   Vital Signs:    There were no vitals taken for this visit.  Due to the remote nature of this encounter (virtual encounter), vitals were unable to be obtained.  Height stated at 69 inches.  Weight stated at 238 pounds.      PHQ-9 Score:   PHQ-9 Total Score:(Patient-Rptd) 2     PHQ-9 Depression Screening  Little interest or pleasure in doing things? (Patient-Rptd) Several days   Feeling down, depressed, or hopeless? (Patient-Rptd) Not at all   PHQ-2 Total Score (Patient-Rptd) 1   Trouble falling or staying asleep, or sleeping too much? (Patient-Rptd) Not at all   Feeling tired or having little energy? (Patient-Rptd) Several days   Poor appetite or overeating? (Patient-Rptd) Not at all   Feeling bad about yourself - or that you are a failure or have let yourself or your family down? (Patient-Rptd) Not at all   Trouble concentrating on things, such as reading the newspaper or watching television? (Patient-Rptd) Not at all   Moving or speaking so slowly that other people could have noticed? Or the opposite - being so fidgety or restless that you have been moving around a lot more than usual? (Patient-Rptd) Not at all   Thoughts that you would be better off dead, or of hurting yourself in some way? (Patient-Rptd) Not at all   PHQ-9 Total Score (Patient-Rptd) 2   If you checked off any problems, how difficult have these problems made it for you to do your work, take care of things at home, or get along with other people? (Patient-Rptd) Somewhat difficult         PHQ-9 Total Score: (Patient-Rptd) 2     NICKY-7  Feeling nervous, anxious or on edge: Not at all  Not being able to stop or control worrying: Several days  Worrying too much about different things: Not at all  Trouble Relaxing: Not at all  Being so restless that it is hard to sit still: Not at all  Feeling afraid as if something awful might happen: Not at all  Becoming easily annoyed or irritable: Not at all  NICKY 7 Total Score: 1  If you checked any problems, how difficult have  these problems made it for you to do your work, take care of things at home, or get along with other people: Not difficult at all      Patient screened positive for depression based on a PHQ-9 score of 2 on 4/15/2025. Follow-up recommendations include: Prescribed antidepressant medication treatment.        Mental Status Exam:   Hygiene:   good  Cooperation:  Cooperative  Eye Contact:  Good  Psychomotor Behavior:  Appropriate  Affect:  Appropriate  Mood: normal  Speech:  Normal  Thought Process:  Goal directed  Thought Content:  Normal  Suicidal:  None  Homicidal:  None  Hallucinations:  None  Delusion:  None  Memory:  Intact  Orientation:  Person, Place, Time, and Situation  Reliability:  good  Insight:  Good  Judgement:  Good  Impulse Control:  Good  Physical/Medical Issues:  Yes see medical hx      Current Medications:   Current Outpatient Medications   Medication Sig Dispense Refill    buPROPion XL (Wellbutrin XL) 300 MG 24 hr tablet Take 1 tablet by mouth Daily. 90 tablet 0    busPIRone (BUSPAR) 5 MG tablet Take 1 tablet by mouth 3 (Three) Times a Day. 270 tablet 0    diclofenac (VOLTAREN) 75 MG EC tablet Take 1 tablet by mouth.      traZODone (DESYREL) 50 MG tablet Take 0.5-1 tablets by mouth Every Night. 90 tablet 1    cetirizine (ZyrTEC) 10 MG tablet Take 1 tablet by mouth Daily.      cholecalciferol (VITAMIN D3) 1000 UNITS tablet Take by mouth.      CRANBERRY PO Take by mouth.      Melatonin 5 MG tablet dispersible Take 2 tablets by mouth.      meloxicam (Mobic) 15 MG tablet Take 1 tablet by mouth Daily. (Patient not taking: Reported on 11/12/2024) 21 tablet 0    Multiple Vitamin (MULTIVITAMIN) capsule Take by mouth.       No current facility-administered medications for this visit.       Physical Exam  Nursing note reviewed.   Constitutional:       Appearance: Normal appearance.   Neurological:      Mental Status: She is alert.   Psychiatric:         Attention and Perception: Attention and perception  normal.         Mood and Affect: Mood and affect normal. Mood is not anxious or depressed.         Speech: Speech normal.         Behavior: Behavior is cooperative.         Thought Content: Thought content normal.         Cognition and Memory: Cognition and memory normal.         Judgment: Judgment normal.        Result Review :     The following data was reviewed by: NICOLE Griffin on 10/28/2024:  Common labs          5/23/2024    08:16 9/23/2024    07:53 12/13/2024    07:48   Common Labs   Glucose 97   92    BUN 11   14    Creatinine 0.82   0.96    Sodium 137   139    Potassium 4.4   4.0    Chloride 102   103    Calcium 9.1   9.0    Albumin 4.2      Total Bilirubin 0.5      Alkaline Phosphatase 60      AST (SGOT) 21      ALT (SGPT) 24      WBC 6.57  7.8     Hemoglobin 12.3  13.7     Hematocrit 37.9  43.4     Platelets 323  389     Total Cholesterol 214      Triglycerides 103      HDL Cholesterol 59      LDL Cholesterol  137      Hemoglobin A1C  5.9  5.10      CMP          5/23/2024    08:16 12/13/2024    07:48   CMP   Glucose 97  92    BUN 11  14    Creatinine 0.82  0.96    EGFR 87.3  72.2    Sodium 137  139    Potassium 4.4  4.0    Chloride 102  103    Calcium 9.1  9.0    Total Protein 6.2     Albumin 4.2     Globulin 2.0     Total Bilirubin 0.5     Alkaline Phosphatase 60     AST (SGOT) 21     ALT (SGPT) 24     Albumin/Globulin Ratio 2.1     BUN/Creatinine Ratio 13.4  14.6      CBC          5/23/2024    08:16 9/23/2024    07:53   CBC   WBC 6.57  7.8    RBC 4.21  4.84    Hemoglobin 12.3  13.7    Hematocrit 37.9  43.4    MCV 90.0  90    MCH 29.2  28.3    MCHC 32.5  31.6    RDW 13.0  13.6    Platelets 323  389      CBC w/diff          5/23/2024    08:16 9/23/2024    07:53   CBC w/Diff   WBC 6.57  7.8    RBC 4.21  4.84    Hemoglobin 12.3  13.7    Hematocrit 37.9  43.4    MCV 90.0  90    MCH 29.2  28.3    MCHC 32.5  31.6    RDW 13.0  13.6    Platelets 323  389    Neutrophil Rel %  69    Lymphocyte Rel %  21     Monocyte Rel %  7    Eosinophil Rel %  1    Basophil Rel %  1      Lipid Panel          5/23/2024    08:16   Lipid Panel   Total Cholesterol 214    Triglycerides 103    HDL Cholesterol 59    VLDL Cholesterol 18    LDL Cholesterol  137    LDL/HDL Ratio 2.28      TSH          5/23/2024    08:16 9/23/2024    07:53   TSH   TSH 2.680  3.250      Electrolytes          5/23/2024    08:16 12/13/2024    07:48   Electrolytes   Sodium 137  139    Potassium 4.4  4.0    Chloride 102  103    Calcium 9.1  9.0      Renal Profile          5/23/2024    08:16 12/13/2024    07:48   Renal Profile   BUN 11  14    Creatinine 0.82  0.96      BMP          5/23/2024    08:16 12/13/2024    07:48   BMP   BUN 11  14    Creatinine 0.82  0.96    Sodium 137  139    Potassium 4.4  4.0    Chloride 102  103    CO2 23.3  25.2    Calcium 9.1  9.0      Most Recent A1C          12/13/2024    07:48   HGBA1C Most Recent   Hemoglobin A1C 5.10          Data reviewed : PCP notes         Assessment and Plan    Problem List Items Addressed This Visit    None  Visit Diagnoses         Recurrent major depressive disorder, in partial remission  (Chronic)       Relevant Medications    buPROPion XL (Wellbutrin XL) 300 MG 24 hr tablet    busPIRone (BUSPAR) 5 MG tablet    traZODone (DESYREL) 50 MG tablet      Generalized anxiety disorder  (Chronic)       Relevant Medications    buPROPion XL (Wellbutrin XL) 300 MG 24 hr tablet    busPIRone (BUSPAR) 5 MG tablet    traZODone (DESYREL) 50 MG tablet      Sleep difficulties        Relevant Medications    traZODone (DESYREL) 50 MG tablet                  Encounter Diagnoses   Name Primary?    Recurrent major depressive disorder, in partial remission     Generalized anxiety disorder     Sleep difficulties           I have reviewed the patient and the results are consistent with the previously documented exam by myself.  I have reviewed the following portions of the patient's ROS and PFSH and confirmed them as accurate.  The  HPI has been updated, chief of complaint, ROS and subjective have been reviewed and up-to-date.  The following portions of the patient's notes were reviewed, confirmed and/or updated this visit as appropriate: History of present illness/Interval history, physical examination, assessment and plan, allergies, current medications, past family medical history, past medical history, past social history, past surgical history and problem list.        TREATMENT PLAN/GOALS: Continue supportive psychotherapy efforts and medications as indicated. Treatment and medication options discussed during today's visit. Patient ackowledged and verbally consented to continue with current treatment plan and was educated on the importance of compliance with treatment and follow-up appointments.    MEDICATION ISSUES:  We discussed risks, benefits, and side effects of the above medications and the patient was agreeable with the plan. Patient was educated on the importance of compliance with treatment and follow-up appointments.  Patient is agreeable to call the office with any worsening of symptoms or onset of side effects. Patient is agreeable to call 911 or go to the nearest ER should he/she begin having SI/HI.     -Decrease BuSpar back to 5 mg 3 times daily for anxiety as patient feels she has been doing well and would like to lower dose.  Encouraged any worsening symptoms or concerns contact clinic she verbalized understanding.  -Continue Wellbutrin 300 mg XL daily as adjunct for depression.  - Decrease trazodone to 25 mg at night as needed for sleep.  Encouraged patient if she had any difficulty with sleeping she can go back to the 50 mg or if some days she thought she could do without it that it was okay to take it more as needed she verbalized understanding.  -Highly encouraged patient if she had any concerns or worsening symptoms to contact the clinic for sooner appointment she verbalized understanding.       Counseled patient  regarding multimodal approach with healthy nutrition, healthy sleep, regular physical activity, social activities, counseling, and medications.      Coping skills reviewed and encouraged positive framing of thoughts     Assisted patient in processing above session content; acknowledged and normalized patient’s thoughts, feelings, and concerns.  Applied  positive coping skills and behavior management in session.  Allowed patient to freely discuss issues without interruption or judgment. Provided safe, confidential environment to facilitate the development of positive therapeutic relationship and encourage open, honest communication. Assisted patient in identifying risk factors which would indicate the need for higher level of care including thoughts to harm self or others and/or self-harming behavior and encouraged patient to contact this office, call 911, or present to the nearest emergency room should any of these events occur. Discussed crisis intervention services and means to access.     MEDS ORDERED DURING VISIT:  New Medications Ordered This Visit   Medications    buPROPion XL (Wellbutrin XL) 300 MG 24 hr tablet     Sig: Take 1 tablet by mouth Daily.     Dispense:  90 tablet     Refill:  0    busPIRone (BUSPAR) 5 MG tablet     Sig: Take 1 tablet by mouth 3 (Three) Times a Day.     Dispense:  270 tablet     Refill:  0    traZODone (DESYREL) 50 MG tablet     Sig: Take 0.5-1 tablets by mouth Every Night.     Dispense:  90 tablet     Refill:  1           Follow Up   Return in about 3 months (around 7/16/2025), or if symptoms worsen or fail to improve, for Recheck.    Patient was given instructions and counseling regarding her condition or for health maintenance advice. Please see specific information pulled into the AVS if appropriate.     Some of the data in this electronic note has been brought forward from a previous encounter, any necessary changes have been made, it has been reviewed by this APRN, and it is  accurate.      This document has been electronically signed by NICOLE Griffin  April 16, 2025 08:45 EDT    Part of this note may be an electronic transcription/translation of spoken language to printed text using the Dragon Dictation System.

## 2025-04-28 ENCOUNTER — TREATMENT (OUTPATIENT)
Dept: PHYSICAL THERAPY | Facility: CLINIC | Age: 50
End: 2025-04-28
Payer: COMMERCIAL

## 2025-04-28 DIAGNOSIS — M54.6 CHRONIC BILATERAL THORACIC BACK PAIN: Primary | ICD-10-CM

## 2025-04-28 DIAGNOSIS — M62.89 MUSCLE TIGHTNESS: ICD-10-CM

## 2025-04-28 DIAGNOSIS — G89.29 CHRONIC BILATERAL THORACIC BACK PAIN: Primary | ICD-10-CM

## 2025-04-28 PROCEDURE — 97161 PT EVAL LOW COMPLEX 20 MIN: CPT | Performed by: PHYSICAL THERAPIST

## 2025-04-28 PROCEDURE — 97530 THERAPEUTIC ACTIVITIES: CPT | Performed by: PHYSICAL THERAPIST

## 2025-04-28 PROCEDURE — 97110 THERAPEUTIC EXERCISES: CPT | Performed by: PHYSICAL THERAPIST

## 2025-04-28 NOTE — PROGRESS NOTES
Physical Therapy Initial Evaluation and Plan of Care    [address] [facility] [address]      Patient: Marcia Shi   : 1975  Diagnosis/ICD-10 Code:  Chronic bilateral thoracic back pain [M54.6, G89.29]  Referring practitioner: MYCHAL Torrez  Date of Initial Visit: 2025  Today's Date:  2025  Patient seen for 1 sessions           Subjective Questionnaire: NDI:13/50 or 26%      Subjective Evaluation    History of Present Illness  Mechanism of injury: Pt first noted mid back pain in ; noted poor posture especially with fatigue. Also has R hip pain issues she has seen doctors/specialists for it and wanting to post-pone hip replacement for as long as possible. Mid-back pain (mostly thoracic pain, with lower area to right of midline) as well as some pain midline in her upper spine near her shoulder blades. She does not have any pain that radiates into her arms/nor legs. The pain has been present for a few years and is worse with physical activity and improved by rest. The patient rates her pain scale is a 7 out of 10 on the pain scale with 10 being the worst pain. There is no associated numbness and tingling. There is no subjective weakness. Limited upright ax to ~1 hour then has to sit or lie down.     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 ([employer]) Pain  At best pain ratin (at rest)  At worst pain ratin (with activity)  Quality: tight, discomfort and dull ache  Relieving factors: medications, ice, heat and rest  Aggravating factors: lifting, movement, overhead activity, ambulation, prolonged positioning, sleeping, squatting, repetitive movement, standing and stairs  Progression: worsening    Hand dominance: right    Diagnostic Tests  X-ray: abnormal (Mild S-shaped scoliosis)    Treatments  Previous treatment: physical therapy and injection treatment  Patient Goals  Patient goals for therapy: decreased pain, increased  motion, increased strength, independence with ADLs/IADLs, return to sport/leisure activities and return to work           Past Medical History:   Diagnosis Date    Anxiety     Chronic pain disorder     Depression     Ganglion cyst of wrist     Hip arthrosis     Scoliosis     UTI (urinary tract infection)     Vitamin D deficiency       Past Surgical History:   Procedure Laterality Date     SECTION      COLONOSCOPY N/A 2021    Procedure: COLONOSCOPY with polypectomy;  Surgeon: Eleuterio Crawford MD;  [place] [facility] [place];  Service: Gastroenterology;  Laterality: N/A;  polyps, diverticulosis    COLONOSCOPY W/ POLYPECTOMY N/A 2022    Procedure: COLONOSCOPY WITH POLYPECTOMY;  Surgeon: Eleuterio Crawford MD;  [place] [facility] [place];  Service: Gastroenterology;  Laterality: N/A;  POLYPS X4    NASAL SEPTUM SURGERY      TONSILLECTOMY            Objective          Postural Observations    Additional Postural Observation Details  Mild thoracic kyphosis    Palpation   Left   Hypertonic in the cervical interspinals, cervical paraspinals, lower trapezius, middle trapezius, pectoralis major, pectoralis minor and upper trapezius.   Tenderness of the cervical interspinals, cervical paraspinals, lower trapezius and middle trapezius.     Right   Hypertonic in the cervical interspinals, cervical paraspinals, lower trapezius, middle trapezius, pectoralis major, pectoralis minor and upper trapezius. Tenderness of the cervical interspinals, cervical paraspinals, lower trapezius and middle trapezius.     Cervical Spine Comments  Left cervical paraspinals: T6-L1 moderate. Right cervical paraspinals: T6-L1 severe.     Tenderness   Cervical Spine   Tenderness in the facet joint, spinous process, left transverse process and right transverse process.   No tenderness in the left scapula, left ribs/costal cartilage, right scapula and right ribs/costal cartilage.     Neurological Testing     Sensation    Cervical/Thoracic   Left   Intact: light touch    Right   Intact: light touch    Additional Neurological Details  Denies numbness or tingling into BUE or BLE    Active Range of Motion   Cervical/Thoracic Spine   Cervical    Extension: 42 degrees   Left lateral flexion: 17 degrees   Right lateral flexion: 25 degrees   Left rotation: 54 degrees   Right rotation: 70 degrees     Thoracic   Flexion: WFL and with pain  Extension: Active thoracic extension: limited but not painful.   Left lateral flexion: Active left thoracic lateral flexion: 50% with pain  Right lateral flexion: Active right thoracic lateral flexion: 50% with pain  Left rotation: Active left thoracic rotation: 50%   Right rotation: Active right thoracic rotation: 50% with pain    Passive Range of Motion     Additional Passive Range of Motion Details  Poor thoracic ext with PA mobs T6-T10    Strength/Myotome Testing   Cervical Spine     Left   Normal strength    Right   Normal strength    Tests   Cervical     Left   Negative alar ligament integrity and active compression ([place]).     Right   Negative alar ligament integrity and active compression ([place]).     Thoracic   Negative slump.     Additional Tests Details  Negative VA testing          Assessment & Plan       Assessment  Impairments: abnormal muscle firing, abnormal muscle tone, abnormal or restricted ROM, activity intolerance, lacks appropriate home exercise program and pain with function   Functional limitations: carrying objects, lifting, walking, pulling, pushing, uncomfortable because of pain, moving in bed, sitting, standing, stooping, reaching overhead and unable to perform repetitive tasks   Assessment details: Marcia Shi is a 50 y.o. year-old female referred to physical therapy for mid-back/thoracic pain. She presents with a stable clinical presentation.  She has comorbidities scoliosis and need for R hip replacement but no personal factors that may affect her progress in the  plan of care. Signs and symptoms are consistent with physical therapy diagnosis of neck pain due to limited cervical and thoracic ROM, muscle tightness especially on R side but no  neural s/s. Patient is appropriate for skilled physical therapy in order to reduce pain and increase ease with daily mobility. During evaluation, pt educated on anatomy, goal of interventions, posture, HEP and body mechanics to promote healthy lifestyle and improve quality of life.    Prognosis: good    Goals  Plan Goals: Plan Goals: STG 6 weeks:  1.  Decrease tightness and tenderness of thoracic PS to moderate on R side by palpation.  2.  Increase cervical AROM to 70 degrees B with rotation  3.  Increase cervical AROM to 25 degrees B with sidebending    LTG 4-8 weeks:  1.  Decrease tenderness of thoracic PS to minimal on R side by palpation.  2.  No mid back pain with working on the computer or standing/walking for at least 1 hour  3.  Improve thoracic PA mobs to WNL and no pain  4. Pt will improve NDI score to 8/50 or less      Plan  Therapy options: will be seen for skilled therapy services  Planned modality interventions: cryotherapy, thermotherapy (hydrocollator packs), TENS, iontophoresis, traction, ultrasound, dry needling and electrical stimulation/Russian stimulation (has e-stim unit at home)  Planned therapy interventions: abdominal trunk stabilization, balance/weight-bearing training, ADL retraining, body mechanics training, flexibility, functional ROM exercises, home exercise program, IADL retraining, joint mobilization, manual therapy, neuromuscular re-education, postural training, soft tissue mobilization, spinal/joint mobilization, strengthening, stretching, therapeutic activities and transfer training  Treatment plan discussed with: patient  Plan details: 1 time per week for 12 weeks        Visit Diagnoses:    ICD-10-CM ICD-9-CM   1. Chronic bilateral thoracic back pain  M54.6 724.1    G89.29 338.29   2. Muscle tightness   M62.89 728.9       Timed:  Manual Therapy:    -     mins  84115;  Therapeutic Exercise:    15     mins  79237;     Neuromuscular Nicholas:    -    mins  32750;    Therapeutic Activity:     10     mins  61288;     Gait Training:      -     mins  68162;     Ultrasound:     8     mins  91654;    Electrical Stimulation:    -     mins  32051 ( );    Low Eval                       20      Mins  60788  Mod Eval                        -     Mins  30893  High Eval                       -     Mins  22517    Untimed:  Electrical Stimulation:    -     mins  78237 ( );  Mechanical Traction:    -     mins  36470;     Timed Treatment:   33   mins   Total Treatment:     65   mins    PT SIGNATURE: Perlita Power PT   [place] license: [id number]  DATE TREATMENT INITIATED: 4/28/2025    Initial Certification  Certification Period: 7/27/2025  I certify that the therapy services are furnished while this patient is under my care.  The services outlined above are required by this patient, and will be reviewed every 90 days.     PHYSICIAN: Arlette Cruz PA      DATE:     Please sign and return via fax to [phone]. Thank you, [facility].

## 2025-05-19 ENCOUNTER — TREATMENT (OUTPATIENT)
Dept: PHYSICAL THERAPY | Facility: CLINIC | Age: 50
End: 2025-05-19
Payer: COMMERCIAL

## 2025-05-19 DIAGNOSIS — M54.6 CHRONIC BILATERAL THORACIC BACK PAIN: Primary | ICD-10-CM

## 2025-05-19 DIAGNOSIS — G89.29 CHRONIC BILATERAL THORACIC BACK PAIN: Primary | ICD-10-CM

## 2025-05-19 DIAGNOSIS — M62.89 MUSCLE TIGHTNESS: ICD-10-CM

## 2025-05-19 PROCEDURE — 97140 MANUAL THERAPY 1/> REGIONS: CPT | Performed by: PHYSICAL THERAPIST

## 2025-05-19 PROCEDURE — 97035 APP MDLTY 1+ULTRASOUND EA 15: CPT | Performed by: PHYSICAL THERAPIST

## 2025-05-19 PROCEDURE — 97110 THERAPEUTIC EXERCISES: CPT | Performed by: PHYSICAL THERAPIST

## 2025-05-19 NOTE — PROGRESS NOTES
Physical Therapy Daily Treatment Note      Patient: Marcia Shi   : 1975  Referring practitioner: Iesha Britt MD  Date of Initial Visit: Type: THERAPY  Noted: 2025  Today's Date:  2025  Patient seen for 2 sessions         Marcia Shi reports: pain depends on activity; 2/10 today is a good day but up to 8/10 some days.               Objective   See Exercise, Manual, and Modality Logs for complete treatment.       Assessment/Plan  She continues to have pain and tightness in her mid-back dependent on her activity. The past few days have been good. Continued US and added STM to R thoracic/lumbar PS with moderate hypertonicity and tenderness today. Reviewed initial stretches for thoracic mobility and added some scapular strengthening with TB today. Ended with heat to thoracic/lumbar area.        Progress per Plan of Care and Progress strengthening /stabilization /functional activity           Timed:  Manual Therapy:    12     mins  99183;  Therapeutic Exercise:    18     mins  08184;     Neuromuscular Nicholas:    -    mins  33402;    Therapeutic Activity:     -     mins  87486;     Gait Training:      -     mins  62502;     Ultrasound:     8     mins  49689;      Untimed:  Electrical Stimulation:    -     mins  86765 ( );  Mechanical Traction:    -     mins  13728;   Dry needling:      -     mins  92587/    Timed Treatment:   38   mins   Total Treatment:     50   mins  Perlita Power PT  Physical Therapist    License #: [id number]

## 2025-05-27 ENCOUNTER — TREATMENT (OUTPATIENT)
Dept: PHYSICAL THERAPY | Facility: CLINIC | Age: 50
End: 2025-05-27
Payer: COMMERCIAL

## 2025-05-27 DIAGNOSIS — M62.89 MUSCLE TIGHTNESS: ICD-10-CM

## 2025-05-27 DIAGNOSIS — G89.29 CHRONIC BILATERAL THORACIC BACK PAIN: Primary | ICD-10-CM

## 2025-05-27 DIAGNOSIS — M54.6 CHRONIC BILATERAL THORACIC BACK PAIN: Primary | ICD-10-CM

## 2025-05-27 PROCEDURE — 97035 APP MDLTY 1+ULTRASOUND EA 15: CPT | Performed by: PHYSICAL THERAPIST

## 2025-05-27 PROCEDURE — 97140 MANUAL THERAPY 1/> REGIONS: CPT | Performed by: PHYSICAL THERAPIST

## 2025-05-27 PROCEDURE — 97110 THERAPEUTIC EXERCISES: CPT | Performed by: PHYSICAL THERAPIST

## 2025-05-27 NOTE — PROGRESS NOTES
Re-Assessment / Re-Certification      Patient: Marcia Shi   : 1975  Diagnosis/ICD-10 Code:  Chronic bilateral thoracic back pain [M54.6, G89.29]  Referring practitioner: Iesha Britt MD  Date of Initial Visit: Type: THERAPY  Noted: 2025  Today's Date:  2025  Patient seen for 3 sessions      Subjective:   Marcia Shi reports: most pain after work; has a higher desk due to her hip pain.   Subjective Questionnaire: NDI:   Clinical Progress: improved  Home Program Compliance: Yes  Treatment has included: therapeutic exercise, neuromuscular re-education, manual therapy, therapeutic activity, electrical stimulation, ultrasound, moist heat, and cryotherapy    Subjective   Objective     Postural Observations     Additional Postural Observation Details  Mild thoracic kyphosis     Palpation   Left   Hypertonic in the cervical interspinals, cervical paraspinals, lower trapezius, middle trapezius, pectoralis major, pectoralis minor and upper trapezius.   Tenderness of the cervical interspinals, cervical paraspinals, lower trapezius and middle trapezius.      Right   Hypertonic in the cervical interspinals, cervical paraspinals, lower trapezius, middle trapezius, pectoralis major, pectoralis minor and upper trapezius.   Tenderness of the cervical interspinals, cervical paraspinals, lower trapezius and middle trapezius.      Cervical Spine Comments  Left cervical paraspinals: T6-L1 minimal. Right cervical paraspinals: T6-L1 moderate.      Tenderness   Cervical Spine   Tenderness in the facet joint, spinous process, left transverse process and right transverse process.   No tenderness in the left scapula, left ribs/costal cartilage, right scapula and right ribs/costal cartilage.      Neurological Testing      Sensation   Cervical/Thoracic   Left   Intact: light touch     Right   Intact: light touch     Additional Neurological Details  Denies numbness or tingling into BUE or BLE     Active  Range of Motion   Cervical/Thoracic Spine   Cervical     Extension: 50 degrees   Left lateral flexion: 18 degrees   Right lateral flexion: 22 degrees   Left rotation: 58 degrees   Right rotation: 75 degrees      Thoracic   Flexion: WFL and with pain  Extension: Active thoracic extension: limited but not painful.   Left lateral flexion: Active left thoracic lateral flexion: 50% with pain  Right lateral flexion: Active right thoracic lateral flexion: 50% with pain  Left rotation: Active left thoracic rotation: 50%   Right rotation: Active right thoracic rotation: 50% with pain     Passive Range of Motion      Additional Passive Range of Motion Details  Poor thoracic ext with PA mobs T6-T10     Strength/Myotome Testing   Cervical Spine      Left   Normal strength     Right   Normal strength     Tests   Cervical      Left   Negative alar ligament integrity and active compression ([place]).      Right   Negative alar ligament integrity and active compression ([place]).      Thoracic   Negative slump.      Additional Tests Details  Negative VA testing     Assessment & Plan       Assessment  Impairments: abnormal muscle firing, abnormal muscle tone, abnormal or restricted ROM, activity intolerance, lacks appropriate home exercise program and pain with function   Functional limitations: carrying objects, lifting, walking, pulling, pushing, uncomfortable because of pain, moving in bed, sitting, standing, stooping, reaching overhead and unable to perform repetitive tasks   Assessment details: Marcia Shi is a 50 y.o. year-old female referred to physical therapy for mid-back/thoracic pain. She presents with a stable clinical presentation.  She has comorbidities scoliosis and need for R hip replacement but no personal factors that may affect her progress in the plan of care. Pt with continued cervical ROM limitations, impaired thoracic extension, no neuro s/s but increased pain with prolonged sitting/standing. R hip also  limits her functional mobility.  Patient is appropriate for skilled physical therapy in order to reduce pain and increase ease with daily mobility. During evaluation, pt educated on anatomy, goal of interventions, posture, HEP and body mechanics to promote healthy lifestyle and improve quality of life.    Prognosis: good    Goals  Plan Goals: Plan Goals: STG 6 weeks:  1.  Decrease tightness and tenderness of thoracic PS to moderate on R side by palpation.-MET  2.  Increase cervical AROM to 70 degrees B with rotation-PROGRESSING  3.  Increase cervical AROM to 25 degrees B with sidebending-PROGRESSING    LTG 12 weeks:  1.  Decrease tenderness of thoracic PS to minimal on R side by palpation.-PROGRESSING  2.  No mid back pain with working on the computer or standing/walking for at least 1 hour-PROGRESSING  3.  Improve thoracic PA mobs to WNL and no pain-PROGRESSING  4. Pt will improve NDI score to 8/50 or less-PROGRESSING      Plan  Therapy options: will be seen for skilled therapy services  Planned modality interventions: cryotherapy, thermotherapy (hydrocollator packs), TENS, iontophoresis, traction, ultrasound, dry needling and electrical stimulation/Russian stimulation (has e-stim unit at home)  Planned therapy interventions: abdominal trunk stabilization, balance/weight-bearing training, ADL retraining, body mechanics training, flexibility, functional ROM exercises, home exercise program, IADL retraining, joint mobilization, manual therapy, neuromuscular re-education, postural training, soft tissue mobilization, spinal/joint mobilization, strengthening, stretching, therapeutic activities and transfer training  Treatment plan discussed with: patient  Plan details: 1 time per week for 10 weeks        Visit Diagnoses:    ICD-10-CM ICD-9-CM   1. Chronic bilateral thoracic back pain  M54.6 724.1    G89.29 338.29   2. Muscle tightness  M62.89 728.9         PT Signature: Perlita Power, PT  [place] license:  [id number]        Timed:  Manual Therapy:    12     mins  10166;  Therapeutic Exercise:    20     mins  49494;     Neuromuscular Nicholas:    -    mins  80190;    Therapeutic Activity:     -     mins  68404;     Gait Training:      -     mins  53784;     Ultrasound:     8     mins  38543;    Electrical Stimulation:    -     mins  24056 ( );    Untimed:  Electrical Stimulation:    -     mins  63066 ( );  Mechanical Traction:    -     mins  12836; \  Dry needling:      -     mins  20560/20561    Timed Treatment:   40   mins   Total Treatment:     50   mins

## 2025-05-30 DIAGNOSIS — E55.9 VITAMIN D DEFICIENCY: Primary | ICD-10-CM

## 2025-05-30 DIAGNOSIS — Z00.00 ANNUAL PHYSICAL EXAM: ICD-10-CM

## 2025-06-02 ENCOUNTER — TREATMENT (OUTPATIENT)
Dept: PHYSICAL THERAPY | Facility: CLINIC | Age: 50
End: 2025-06-02
Payer: COMMERCIAL

## 2025-06-02 DIAGNOSIS — M62.89 MUSCLE TIGHTNESS: ICD-10-CM

## 2025-06-02 DIAGNOSIS — M54.6 CHRONIC BILATERAL THORACIC BACK PAIN: Primary | ICD-10-CM

## 2025-06-02 DIAGNOSIS — G89.29 CHRONIC BILATERAL THORACIC BACK PAIN: Primary | ICD-10-CM

## 2025-06-02 PROCEDURE — 97110 THERAPEUTIC EXERCISES: CPT | Performed by: PHYSICAL THERAPIST

## 2025-06-02 PROCEDURE — 97035 APP MDLTY 1+ULTRASOUND EA 15: CPT | Performed by: PHYSICAL THERAPIST

## 2025-06-02 PROCEDURE — 97140 MANUAL THERAPY 1/> REGIONS: CPT | Performed by: PHYSICAL THERAPIST

## 2025-06-02 NOTE — PROGRESS NOTES
Physical Therapy Daily Treatment Note      Patient: Marcia Shi   : 1975  Referring practitioner: Iesha Britt MD  Date of Initial Visit: Type: THERAPY  Noted: 2025  Today's Date:  2025  Patient seen for 4 sessions         Marcia Shi reports: pain the past week only 3/10 and she has felt pretty good.              Objective   See Exercise, Manual, and Modality Logs for complete treatment.       Assessment/Plan  Pt with decreased muscle hypertonicity at R thoracic PS today with US and STM along with decreased tenderness and decreased pain reported over the past week.  Able to tolerate increased resistance with scapular/mid-back strengthening today. Heat at end of session to decrease tightness and soreness.        Progress per Plan of Care and Progress strengthening /stabilization /functional activity           Timed:  Manual Therapy:    12     mins  61767;  Therapeutic Exercise:    20     mins  02679;     Neuromuscular Nicholas:    -    mins  46693;    Therapeutic Activity:     -     mins  34391;     Gait Training:      -     mins  04235;     Ultrasound:     8     mins  03065;      Untimed:  Electrical Stimulation:    -     mins  64616 ( );  Mechanical Traction:    -     mins  56935;   Dry needling:      -     mins  99031/    Timed Treatment:   40   mins   Total Treatment:     50   mins  Perlita Power PT  Physical Therapist    License #: [id number]

## 2025-06-03 LAB
25(OH)D3+25(OH)D2 SERPL-MCNC: 62.2 NG/ML (ref 30–100)
ALBUMIN SERPL-MCNC: 4.3 G/DL (ref 3.5–5.2)
ALBUMIN/GLOB SERPL: 1.9 G/DL
ALP SERPL-CCNC: 53 U/L (ref 39–117)
ALT SERPL-CCNC: 13 U/L (ref 1–33)
AST SERPL-CCNC: 16 U/L (ref 1–32)
BILIRUB SERPL-MCNC: 0.3 MG/DL (ref 0–1.2)
BUN SERPL-MCNC: 11 MG/DL (ref 6–20)
BUN/CREAT SERPL: 10.5 (ref 7–25)
CALCIUM SERPL-MCNC: 9.2 MG/DL (ref 8.6–10.5)
CHLORIDE SERPL-SCNC: 104 MMOL/L (ref 98–107)
CHOLEST SERPL-MCNC: 200 MG/DL (ref 0–200)
CO2 SERPL-SCNC: 21.8 MMOL/L (ref 22–29)
CREAT SERPL-MCNC: 1.05 MG/DL (ref 0.57–1)
EGFRCR SERPLBLD CKD-EPI 2021: 64.5 ML/MIN/1.73
ERYTHROCYTE [DISTWIDTH] IN BLOOD BY AUTOMATED COUNT: 14.8 % (ref 12.3–15.4)
GLOBULIN SER CALC-MCNC: 2.3 GM/DL
GLUCOSE SERPL-MCNC: 95 MG/DL (ref 65–99)
HBA1C MFR BLD: 5 % (ref 4.8–5.6)
HCT VFR BLD AUTO: 36.6 % (ref 34–46.6)
HDLC SERPL-MCNC: 55 MG/DL (ref 40–60)
HGB BLD-MCNC: 11.5 G/DL (ref 12–15.9)
LDLC SERPL CALC-MCNC: 129 MG/DL (ref 0–100)
LDLC/HDLC SERPL: 2.31 {RATIO}
MCH RBC QN AUTO: 28.7 PG (ref 26.6–33)
MCHC RBC AUTO-ENTMCNC: 31.4 G/DL (ref 31.5–35.7)
MCV RBC AUTO: 91.3 FL (ref 79–97)
PLATELET # BLD AUTO: 371 10*3/MM3 (ref 140–450)
POTASSIUM SERPL-SCNC: 4.5 MMOL/L (ref 3.5–5.2)
PROT SERPL-MCNC: 6.6 G/DL (ref 6–8.5)
RBC # BLD AUTO: 4.01 10*6/MM3 (ref 3.77–5.28)
SODIUM SERPL-SCNC: 138 MMOL/L (ref 136–145)
TRIGL SERPL-MCNC: 90 MG/DL (ref 0–150)
TSH SERPL DL<=0.005 MIU/L-ACNC: 2.33 UIU/ML (ref 0.45–4.5)
VLDLC SERPL CALC-MCNC: 16 MG/DL (ref 5–40)
WBC # BLD AUTO: 5.58 10*3/MM3 (ref 3.4–10.8)

## 2025-06-09 ENCOUNTER — OFFICE VISIT (OUTPATIENT)
Dept: INTERNAL MEDICINE | Facility: CLINIC | Age: 50
End: 2025-06-09
Payer: COMMERCIAL

## 2025-06-09 VITALS
OXYGEN SATURATION: 97 % | BODY MASS INDEX: 28.88 KG/M2 | TEMPERATURE: 98.2 F | WEIGHT: 195 LBS | DIASTOLIC BLOOD PRESSURE: 60 MMHG | HEIGHT: 69 IN | HEART RATE: 85 BPM | SYSTOLIC BLOOD PRESSURE: 110 MMHG

## 2025-06-09 DIAGNOSIS — D64.9 ANEMIA, UNSPECIFIED TYPE: ICD-10-CM

## 2025-06-09 DIAGNOSIS — Z00.00 ANNUAL PHYSICAL EXAM: Primary | ICD-10-CM

## 2025-06-09 PROCEDURE — 99213 OFFICE O/P EST LOW 20 MIN: CPT | Performed by: FAMILY MEDICINE

## 2025-06-09 PROCEDURE — 99396 PREV VISIT EST AGE 40-64: CPT | Performed by: FAMILY MEDICINE

## 2025-06-09 NOTE — PROGRESS NOTES
Subjective   Marcia Shi is a 50 y.o. female.   Chief Complaint   Patient presents with    Annual Exam       History of Present Illness     CPE-patient is here today for a complete physical exam.      CPE- no complaints.    Patient started treatments at [facility] in fall 2024.  She lost 41 pounds from November 2024.  She is planning weaning off and end of treatment in October 2025.  She reports that she tolerates treatment without significant side effects.    She followed up with spinal specialist.  She has minimal scoliosis.    She was evaluated by orthopedist for right hip pain.  She is in physical therapy.  They are trying to postpone surgery as long as possible.       She continues to follow-up with psychiatrist.  Medications were adjusted.  It helped.  No suicidal thoughts.     Otherwise there is no change  in medical, surgical or family history from last office visit.  No change in over-the-counter medications.  She is not allergic to any medications.  She does not use tobacco products, no alcohol, no drugs.  She exercises in PT and does home exercise program recommended by PT.  Dental appointments every 6 months, yearly eye exams.  She was evaluated by GYN less than a year ago.  She is up-to-date with Pap smear and mammogram.  She had colonoscopy in December 2022.  Polyps.  Next was recommended 3 years later.  She had COVID-vaccine -9/2024, tetanus vaccine in November 2020.  Shingrix x 2.    Anemia-hemoglobin at 11.5 from 13.7, hematocrit 36.6.  Normal white count and platelets.  No blood in stool, no blood in urine.  She has heavy periods in the last few months.  She is scheduled with her GYN next month.    Review of Systems   Constitutional:  Positive for fatigue.   Respiratory:  Negative for shortness of breath.    Cardiovascular:  Negative for chest pain and palpitations.   Gastrointestinal:  Negative for blood in stool, nausea and vomiting.   Genitourinary:  Negative for  hematuria.   Neurological:  Negative for light-headedness.   Psychiatric/Behavioral:  Negative for suicidal ideas.          Objective   Wt Readings from Last 3 Encounters:   06/09/25 88.5 kg (195 lb)   11/12/24 103 kg (227 lb)   10/29/24 103 kg (227 lb)      Vitals:    06/09/25 0801   BP: 110/60   Pulse: 85   Temp: 98.2 °F (36.8 °C)   SpO2: 97%     Temp Readings from Last 3 Encounters:   06/09/25 98.2 °F (36.8 °C)   10/29/24 97.8 °F (36.6 °C)   09/23/24 97.5 °F (36.4 °C)     BP Readings from Last 3 Encounters:   06/09/25 110/60   10/29/24 108/70   10/24/24 123/82     Pulse Readings from Last 3 Encounters:   06/09/25 85   10/29/24 75   10/24/24 86     Body mass index is 28.78 kg/m².    Physical Exam  Vitals reviewed.   Constitutional:       General: She is not in acute distress.     Appearance: She is well-developed. She is not diaphoretic.   HENT:      Head: Normocephalic and atraumatic. Hair is normal.      Right Ear: Hearing, tympanic membrane, ear canal and external ear normal. No decreased hearing noted. No drainage.      Left Ear: Hearing, tympanic membrane, ear canal and external ear normal. No decreased hearing noted.      Nose: No nasal deformity.   Eyes:      General: Lids are normal.         Right eye: No discharge.         Left eye: No discharge.      Conjunctiva/sclera: Conjunctivae normal.      Pupils: Pupils are equal, round, and reactive to light.   Neck:      Thyroid: No thyromegaly.      Vascular: No JVD.      Trachea: No tracheal deviation.   Cardiovascular:      Rate and Rhythm: Normal rate and regular rhythm.      Pulses: Normal pulses.      Heart sounds: Normal heart sounds. No murmur heard.     No friction rub. No gallop.   Pulmonary:      Effort: Pulmonary effort is normal. No respiratory distress.      Breath sounds: Normal breath sounds. No wheezing or rales.   Chest:      Chest wall: No tenderness.   Abdominal:      General: There is no distension.      Palpations: Abdomen is soft. There is  no mass.      Tenderness: There is no abdominal tenderness. There is no guarding or rebound.      Hernia: No hernia is present.   Musculoskeletal:         General: No tenderness or deformity. Normal range of motion.   Lymphadenopathy:      Cervical: No cervical adenopathy.      Upper Body:      Right upper body: No supraclavicular adenopathy.      Left upper body: No supraclavicular adenopathy.   Skin:     General: Skin is warm and dry.      Findings: No erythema or rash.   Neurological:      Mental Status: She is alert and oriented to person, place, and time.      Cranial Nerves: No cranial nerve deficit.      Motor: No abnormal muscle tone.      Coordination: Coordination normal.      Deep Tendon Reflexes: Reflexes are normal and symmetric. Reflexes normal.   Psychiatric:         Behavior: Behavior normal.         Thought Content: Thought content normal.         Judgment: Judgment normal.         Assessment & Plan   Diagnoses and all orders for this visit:    1. Annual physical exam (Primary)    2. Anemia, unspecified type  -     Vitamin B12 & Folate  -     Iron Profile w/o Ferritin        CPE-preventive counseling provided.  Blood work results reviewed with patient.  Continue regular dental appointments at least every 6 months.  Increase exercise as tolerated.  Try to get at least 30 minutes a day, 5 days a week.  Good job with weight loss.  Patient follows up with weight loss clinic.  She is up-to-date with cancer screening.  She will be due for colon cancer screening in December 2025.  She will call to schedule it.  We discussed recommendations for vaccinations including COVID-vaccine.  Sun protection recommended.    Anemia-new problem.  Likely secondary to heavy periods.  We are checking iron studies, B12 and folate.  Patient is advised to discuss it with her GYN.  She is scheduled next month.  Follow-up as needed.

## 2025-06-10 LAB
FOLATE SERPL-MCNC: >20 NG/ML (ref 4.78–24.2)
IRON SATN MFR SERPL: 5 % (ref 20–50)
IRON SERPL-MCNC: 26 MCG/DL (ref 37–145)
TIBC SERPL-MCNC: 477 MCG/DL
UIBC SERPL-MCNC: 451 MCG/DL (ref 112–346)
VIT B12 SERPL-MCNC: >2000 PG/ML (ref 211–946)

## 2025-06-12 ENCOUNTER — TELEPHONE (OUTPATIENT)
Dept: ORTHOPEDIC SURGERY | Facility: CLINIC | Age: 50
End: 2025-06-12
Payer: COMMERCIAL

## 2025-06-16 ENCOUNTER — TREATMENT (OUTPATIENT)
Dept: PHYSICAL THERAPY | Facility: CLINIC | Age: 50
End: 2025-06-16
Payer: COMMERCIAL

## 2025-06-16 DIAGNOSIS — G89.29 CHRONIC BILATERAL THORACIC BACK PAIN: Primary | ICD-10-CM

## 2025-06-16 DIAGNOSIS — M62.89 MUSCLE TIGHTNESS: ICD-10-CM

## 2025-06-16 DIAGNOSIS — M54.6 CHRONIC BILATERAL THORACIC BACK PAIN: Primary | ICD-10-CM

## 2025-06-16 PROCEDURE — 97035 APP MDLTY 1+ULTRASOUND EA 15: CPT | Performed by: PHYSICAL THERAPIST

## 2025-06-16 PROCEDURE — 97110 THERAPEUTIC EXERCISES: CPT | Performed by: PHYSICAL THERAPIST

## 2025-06-16 PROCEDURE — 97140 MANUAL THERAPY 1/> REGIONS: CPT | Performed by: PHYSICAL THERAPIST

## 2025-06-16 NOTE — PROGRESS NOTES
Physical Therapy Daily Treatment Note      Patient: Marcia Shi   : 1975  Referring practitioner: Iesha Britt MD  Date of Initial Visit: Type: THERAPY  Noted: 2025  Today's Date:  2025  Patient seen for 5 sessions         Marcia Shi reports: feels like pain and tightness are improving; pain 4-5/10 recently. Still all R sided.               Objective   See Exercise, Manual, and Modality Logs for complete treatment.       Assessment/Plan  Continued R sided thoracic PS hypertonicity and tenderness T6 to L1; improved after US and STM with decreased hypertonicity and tenderness. Added L stretch at treadmill today to improve spinal mobility and decreased muscle tightness. Heat at end of session to decreased muscle tension and tightness along with discussing about trial of dry needling in the future.        Progress per Plan of Care and Progress strengthening /stabilization /functional activity         Timed:  Manual Therapy:    12     mins  25247;  Therapeutic Exercise:    20     mins  48504;     Neuromuscular Nicholas:    -    mins  71919;    Therapeutic Activity:     -     mins  30996;     Gait Training:      -     mins  54688;     Ultrasound:     8     mins  54938;      Untimed:  Electrical Stimulation:    -     mins  99085 ( );  Mechanical Traction:    -     mins  08662;   Dry needling:      -     mins  /    Timed Treatment:   40   mins   Total Treatment:     50   mins  Perlita Power PT  Physical Therapist    License #: [id number]

## 2025-06-23 ENCOUNTER — TREATMENT (OUTPATIENT)
Dept: PHYSICAL THERAPY | Facility: CLINIC | Age: 50
End: 2025-06-23
Payer: COMMERCIAL

## 2025-06-23 DIAGNOSIS — M62.89 MUSCLE TIGHTNESS: ICD-10-CM

## 2025-06-23 DIAGNOSIS — G89.29 CHRONIC BILATERAL THORACIC BACK PAIN: Primary | ICD-10-CM

## 2025-06-23 DIAGNOSIS — M54.6 CHRONIC BILATERAL THORACIC BACK PAIN: Primary | ICD-10-CM

## 2025-06-23 PROCEDURE — 97530 THERAPEUTIC ACTIVITIES: CPT | Performed by: PHYSICAL THERAPIST

## 2025-06-23 PROCEDURE — DRYNDLTRIAL DRY NEEDLING TRIAL: Performed by: PHYSICAL THERAPIST

## 2025-06-23 PROCEDURE — 97110 THERAPEUTIC EXERCISES: CPT | Performed by: PHYSICAL THERAPIST

## 2025-06-23 NOTE — PROGRESS NOTES
Re-Assessment / Re-Certification      Patient: Marcia Shi   : 1975  Diagnosis/ICD-10 Code:  Chronic bilateral thoracic back pain [M54.6, G89.29]  Referring practitioner: Iesha Britt MD  Date of Initial Visit: Type: THERAPY  Noted: 2025  Today's Date:  2025  Patient seen for 6 sessions      Subjective:   Marcia Shi reports: her neck and back are feeling ok; wants to try dry needling today.  Subjective Questionnaire: NDI: 16/50 or 32%  Clinical Progress: improved  Home Program Compliance: Yes  Treatment has included: therapeutic exercise, neuromuscular re-education, manual therapy, therapeutic activity, electrical stimulation, ultrasound, moist heat, and cryotherapy    History:  pt denies active infection, blood bourne illness, needle phobia, use of blood thinners, any compromised immune system, recent surgery, or pregnancy.      Subjective   Objective     Postural Observations     Additional Postural Observation Details  Mild thoracic kyphosis     Palpation   Left   Hypertonic in the cervical interspinals, cervical paraspinals, lower trapezius, middle trapezius, pectoralis major, pectoralis minor and upper trapezius.   Tenderness of the cervical interspinals, cervical paraspinals, lower trapezius and middle trapezius.      Right   Hypertonic in the cervical interspinals, cervical paraspinals, lower trapezius, middle trapezius, pectoralis major, pectoralis minor and upper trapezius.   Tenderness of the cervical interspinals, cervical paraspinals, lower trapezius and middle trapezius.      Cervical Spine Comments  Left cervical paraspinals: T6-L1 minimal. Right cervical paraspinals: T6-L1 moderate.      Tenderness   Cervical Spine   Tenderness in the facet joint, spinous process, left transverse process and right transverse process.   No tenderness in the left scapula, left ribs/costal cartilage, right scapula and right ribs/costal cartilage.      Neurological Testing      Sensation    Cervical/Thoracic   Left   Intact: light touch     Right   Intact: light touch     Additional Neurological Details  Denies numbness or tingling into BUE or BLE     Active Range of Motion   Cervical/Thoracic Spine   Cervical     Flexion: 55 degrees  Extension: 48 degrees   Left lateral flexion: 22 degrees   Right lateral flexion: 26 degrees   Left rotation: 62 degrees   Right rotation: 75 degrees      Thoracic   Flexion: WFL and with pain  Extension: Active thoracic extension: limited but not painful.   Left lateral flexion: Active left thoracic lateral flexion:  WNL  Right lateral flexion: Active right thoracic lateral flexion: WNL  Left rotation: Active left thoracic rotation: 50%   Right rotation: Active right thoracic rotation: 50% with pain     Passive Range of Motion      Additional Passive Range of Motion Details  Fair thoracic ext with PA mobs T6-T10     Strength/Myotome Testing   Cervical Spine      Left   Normal strength     Right   Normal strength    Patient was instructed in indications for dry needling treatment,  conditions treated, procedure to be performed, possible side effects, contraindications, and risks of the procedure.  All questions were answered.  Patient agreed to have dry needling treatment performed and signed the consent.       Assessment & Plan       Assessment  Impairments: abnormal muscle firing, abnormal muscle tone, abnormal or restricted ROM, activity intolerance, lacks appropriate home exercise program and pain with function   Functional limitations: carrying objects, lifting, walking, pulling, pushing, uncomfortable because of pain, moving in bed, sitting, standing, stooping, reaching overhead and unable to perform repetitive tasks   Assessment details: Marcia Shi is a 4 year-old female referred to physical therapy for mid-back/thoracic pain. She presents with a stable clinical presentation.  She has comorbidities scoliosis and need for R hip replacement but no personal  factors that may affect her progress in the plan of care. Pt with improving cervical ROM, increased thoracic extension, no neuro s/s  and improving pain with prolonged sitting/standing. R hip also limits her functional mobility.  Trial of dry needling today. Patient is appropriate for skilled physical therapy in order to reduce pain and increase ease with daily mobility. During evaluation, pt educated on anatomy, goal of interventions, posture, HEP and body mechanics to promote healthy lifestyle and improve quality of life.    Prognosis: good    Goals  Plan Goals: Plan Goals: STG 6 weeks:  1.  Decrease tightness and tenderness of thoracic PS to moderate on R side by palpation.-MET  2.  Increase cervical AROM to 70 degrees B with rotation-PROGRESSING  3.  Increase cervical AROM to 25 degrees B with sidebending-PROGRESSING    LTG 12 weeks:  1.  Decrease tenderness of thoracic PS to minimal on R side by palpation.-PROGRESSING  2.  No mid back pain with working on the computer or standing/walking for at least 1 hour-PROGRESSING  3.  Improve thoracic PA mobs to WNL and no pain-PROGRESSING  4. Pt will improve NDI score to 8/50 or less-PROGRESSING      Plan  Therapy options: will be seen for skilled therapy services  Planned modality interventions: cryotherapy, thermotherapy (hydrocollator packs), TENS, iontophoresis, traction, ultrasound, dry needling and electrical stimulation/Russian stimulation (has e-stim unit at home)  Planned therapy interventions: abdominal trunk stabilization, balance/weight-bearing training, ADL retraining, body mechanics training, flexibility, functional ROM exercises, home exercise program, IADL retraining, joint mobilization, manual therapy, neuromuscular re-education, postural training, soft tissue mobilization, spinal/joint mobilization, strengthening, stretching, therapeutic activities and transfer training  Treatment plan discussed with: patient  Plan details: 1 time per week for 8  weeks        Visit Diagnoses:    ICD-10-CM ICD-9-CM   1. Chronic bilateral thoracic back pain  M54.6 724.1    G89.29 338.29   2. Muscle tightness  M62.89 728.9         PT Signature: STEVE Ellis license: [id number]        Timed:  Manual Therapy:    -     mins  63687;  Therapeutic Exercise:    15     mins  20111;     Neuromuscular Nicholas:    -    mins  45702;    Therapeutic Activity:     10     mins  72077;     Gait Training:      -     mins  81951;     Ultrasound:     -     mins  90642;    Electrical Stimulation:    -     mins  90882 ( );    Untimed:  Electrical Stimulation:    -     mins  46779 ( );  Mechanical Traction:    -     mins  28699; \  Dry needling:      15     mins  20560/20561 TRIAL TODAY    Timed Treatment:   25   mins   Total Treatment:     50   mins

## 2025-07-11 ENCOUNTER — APPOINTMENT (OUTPATIENT)
Dept: WOMENS IMAGING | Facility: HOSPITAL | Age: 50
End: 2025-07-11
Payer: COMMERCIAL

## 2025-07-11 PROCEDURE — 77063 BREAST TOMOSYNTHESIS BI: CPT | Performed by: RADIOLOGY

## 2025-07-11 PROCEDURE — 77067 SCR MAMMO BI INCL CAD: CPT | Performed by: RADIOLOGY

## 2025-07-14 ENCOUNTER — TELEMEDICINE (OUTPATIENT)
Dept: PSYCHIATRY | Facility: CLINIC | Age: 50
End: 2025-07-14
Payer: COMMERCIAL

## 2025-07-14 DIAGNOSIS — F41.1 GENERALIZED ANXIETY DISORDER: Chronic | ICD-10-CM

## 2025-07-14 DIAGNOSIS — F33.0 MILD EPISODE OF RECURRENT MAJOR DEPRESSIVE DISORDER: Chronic | ICD-10-CM

## 2025-07-14 DIAGNOSIS — G47.9 SLEEP DIFFICULTIES: ICD-10-CM

## 2025-07-14 PROBLEM — M16.11 OSTEOARTHRITIS OF RIGHT HIP: Status: ACTIVE | Noted: 2025-06-17

## 2025-07-14 PROBLEM — M25.551 PAIN IN RIGHT HIP: Status: ACTIVE | Noted: 2025-06-09

## 2025-07-14 RX ORDER — BUSPIRONE HYDROCHLORIDE 5 MG/1
5 TABLET ORAL 3 TIMES DAILY
Qty: 270 TABLET | Refills: 0 | Status: SHIPPED | OUTPATIENT
Start: 2025-07-14

## 2025-07-14 RX ORDER — TRAZODONE HYDROCHLORIDE 50 MG/1
50 TABLET ORAL NIGHTLY
Qty: 90 TABLET | Refills: 1 | Status: SHIPPED | OUTPATIENT
Start: 2025-07-14

## 2025-07-14 RX ORDER — BUPROPION HYDROCHLORIDE 300 MG/1
300 TABLET ORAL DAILY
Qty: 90 TABLET | Refills: 0 | Status: SHIPPED | OUTPATIENT
Start: 2025-07-14

## 2025-07-14 RX ORDER — DICLOFENAC SODIUM 75 MG/1
75 TABLET, DELAYED RELEASE ORAL 2 TIMES DAILY
COMMUNITY
Start: 2025-06-09

## 2025-07-14 NOTE — PROGRESS NOTES
"This provider is located at [facility], [address] [address].The Patient is seen remotely at home, [address] [place] [address] via [employer].  Patient is being seen via telehealth and confirm that they are in a secure environment for this session. The patient's condition being diagnosed/treated is appropriate for telemedicine. The provider identified himself/herself: herself as well as her credentials.   The patient gave consent to be seen remotely, and when consent is given they understand that the consent allows for patient identifiable information to be sent to a third party as needed.   They may refuse to be seen remotely at any time. The electronic data is encrypted and password protected, and the patient has been advised of the potential risks to privacy not withstanding such measures.    You have chosen to receive care through a telehealth visit.  Do you consent to use a video/audio connection for your medical care today? Yes. Patient verified Name, , and address.  No changes noted      Chief Complaint  Anxiety and depression     Subjective    Marciabillie Shi presents to [facility] for medication management.     History of Present Illness  Patient presents today reporting that she is doing \"pretty good\".  She states that she has seen an improvement as far as her mood and depression as she took a 2-week vacation which helped her relax.  Patient states however she goes back tomorrow to work so she notices some depression is going to happen with returning.  Patient states her anxiety has been fine with no issues and denies any panic attacks.  Reports that she has had to use 50 mg of the trazodone which is helpful for falling and staying asleep.  States appetite is good.  Patient denies any side effects to medications.  Rates depression a 3-4 on a scale of 0-10 with 10 being the worst.  She states it is mostly related to work " stressors.  Denies any other issues or concerns feels as if she is managing well with current medicines.  Denies any SI/HI/AH/VH.  Patient noted that she felt she was doing well with current dosage and did not want to make any changes.      Objective   Vital Signs:   There were no vitals taken for this visit.  Due to the remote nature of this encounter (virtual encounter), vitals were unable to be obtained.  Height stated at 69 inches.  Weight stated at 238 pounds.      PHQ-9 Score:   PHQ-9 Total Score:(Patient-Rptd) 4     PHQ-9 Depression Screening  Little interest or pleasure in doing things? (Patient-Rptd) Several days   Feeling down, depressed, or hopeless? (Patient-Rptd) Several days   PHQ-2 Total Score (Patient-Rptd) 2   Trouble falling or staying asleep, or sleeping too much? (Patient-Rptd) Not at all   Feeling tired or having little energy? (Patient-Rptd) Several days   Poor appetite or overeating? (Patient-Rptd) Not at all   Feeling bad about yourself - or that you are a failure or have let yourself or your family down? (Patient-Rptd) Not at all   Trouble concentrating on things, such as reading the newspaper or watching television? (Patient-Rptd) Several days   Moving or speaking so slowly that other people could have noticed? Or the opposite - being so fidgety or restless that you have been moving around a lot more than usual? (Patient-Rptd) Not at all   Thoughts that you would be better off dead, or of hurting yourself in some way? (Patient-Rptd) Not at all   PHQ-9 Total Score (Patient-Rptd) 4   If you checked off any problems, how difficult have these problems made it for you to do your work, take care of things at home, or get along with other people? (Patient-Rptd) Somewhat difficult         PHQ-9 Total Score: (Patient-Rptd) 4     NICKY-7  Feeling nervous, anxious or on edge: (Patient-Rptd) Several days  Not being able to stop or control worrying: (Patient-Rptd) Not at all  Worrying too much about  different things: (Patient-Rptd) Not at all  Trouble Relaxing: (Patient-Rptd) Not at all  Being so restless that it is hard to sit still: (Patient-Rptd) Not at all  Feeling afraid as if something awful might happen: (Patient-Rptd) Not at all  Becoming easily annoyed or irritable: (Patient-Rptd) Several days  NICKY 7 Total Score: (Patient-Rptd) 2  If you checked any problems, how difficult have these problems made it for you to do your work, take care of things at home, or get along with other people: (Patient-Rptd) Somewhat difficult      Patient screened positive for depression based on a PHQ-9 score of 4 on 7/14/2025. Follow-up recommendations include: Prescribed antidepressant medication treatment.        Mental Status Exam:   Hygiene:   good  Cooperation:  Cooperative  Eye Contact:  Good  Psychomotor Behavior:  Appropriate  Affect:  Appropriate  Mood: normal and depressed  Speech:  Normal  Thought Process:  Goal directed  Thought Content:  Normal  Suicidal:  None  Homicidal:  None  Hallucinations:  None  Delusion:  None  Memory:  Intact  Orientation:  Person, Place, Time, and Situation  Reliability:  good  Insight:  Good  Judgement:  Good  Impulse Control:  Good  Physical/Medical Issues:  Yes see medical hx     Current Medications:   Current Outpatient Medications   Medication Sig Dispense Refill    buPROPion XL (Wellbutrin XL) 300 MG 24 hr tablet Take 1 tablet by mouth Daily. 90 tablet 0    busPIRone (BUSPAR) 5 MG tablet Take 1 tablet by mouth 3 (Three) Times a Day. 270 tablet 0    diclofenac (VOLTAREN) 75 MG EC tablet Take 1 tablet by mouth 2 (Two) Times a Day.      traZODone (DESYREL) 50 MG tablet Take 1 tablet by mouth Every Night. 90 tablet 1    cetirizine (ZyrTEC) 10 MG tablet Take 1 tablet by mouth Daily.      cholecalciferol (VITAMIN D3) 1000 UNITS tablet Take by mouth.      CRANBERRY PO Take by mouth.      Melatonin 5 MG tablet dispersible Take 2 tablets by mouth.      Multiple Vitamin (MULTIVITAMIN)  capsule Take by mouth.      SEMAGLUTIDE PO 0.48 cc weekly for 6 weeks       No current facility-administered medications for this visit.       Physical Exam  Nursing note reviewed.   Constitutional:       Appearance: Normal appearance.   Neurological:      Mental Status: She is alert.   Psychiatric:         Attention and Perception: Attention and perception normal.         Mood and Affect: Affect normal. Mood is depressed. Mood is not anxious.         Speech: Speech normal.         Behavior: Behavior is cooperative.         Thought Content: Thought content normal.         Cognition and Memory: Cognition and memory normal.         Judgment: Judgment normal.        Result Review :     The following data was reviewed by: NICOLE Griffin on 10/28/2024:  Common labs          9/23/2024    07:53 12/13/2024    07:48 6/2/2025    07:52   Common Labs   Glucose  92  95    BUN  14  11.0    Creatinine  0.96  1.05    Sodium  139  138    Potassium  4.0  4.5    Chloride  103  104    Calcium  9.0  9.2    Albumin   4.3    Total Bilirubin   0.3    Alkaline Phosphatase   53    AST (SGOT)   16    ALT (SGPT)   13    WBC 7.8   5.58    Hemoglobin 13.7   11.5    Hematocrit 43.4   36.6    Platelets 389   371    Total Cholesterol   200    Triglycerides   90    HDL Cholesterol   55    LDL Cholesterol    129    Hemoglobin A1C 5.9  5.10  5.00      CMP          12/13/2024    07:48 6/2/2025    07:52   CMP   Glucose 92  95    BUN 14  11.0    Creatinine 0.96  1.05    EGFR 72.2  64.5    Sodium 139  138    Potassium 4.0  4.5    Chloride 103  104    Calcium 9.0  9.2    Total Protein  6.6    Albumin  4.3    Globulin  2.3    Total Bilirubin  0.3    Alkaline Phosphatase  53    AST (SGOT)  16    ALT (SGPT)  13    Albumin/Globulin Ratio  1.9    BUN/Creatinine Ratio 14.6  10.5      CBC          9/23/2024    07:53 6/2/2025    07:52   CBC   WBC 7.8  5.58    RBC 4.84  4.01    Hemoglobin 13.7  11.5    Hematocrit 43.4  36.6    MCV 90  91.3    MCH 28.3  28.7     MCHC 31.6  31.4    RDW 13.6  14.8    Platelets 389  371      CBC w/diff          5/23/2024    08:16 9/23/2024    07:53   CBC w/Diff   WBC 6.57  7.8    RBC 4.21  4.84    Hemoglobin 12.3  13.7    Hematocrit 37.9  43.4    MCV 90.0  90    MCH 29.2  28.3    MCHC 32.5  31.6    RDW 13.0  13.6    Platelets 323  389    Neutrophil Rel %  69    Lymphocyte Rel %  21    Monocyte Rel %  7    Eosinophil Rel %  1    Basophil Rel %  1      Lipid Panel          6/2/2025    07:52   Lipid Panel   Total Cholesterol 200    Triglycerides 90    HDL Cholesterol 55    VLDL Cholesterol 16    LDL Cholesterol  129    LDL/HDL Ratio 2.31      TSH          9/23/2024    07:53 6/2/2025    07:52   TSH   TSH 3.250  2.330      Electrolytes          12/13/2024    07:48 6/2/2025    07:52   Electrolytes   Sodium 139  138    Potassium 4.0  4.5    Chloride 103  104    Calcium 9.0  9.2      Renal Profile          12/13/2024    07:48 6/2/2025    07:52   Renal Profile   BUN 14  11.0    Creatinine 0.96  1.05      BMP          12/13/2024    07:48 6/2/2025    07:52   BMP   BUN 14  11.0    Creatinine 0.96  1.05    Sodium 139  138    Potassium 4.0  4.5    Chloride 103  104    CO2 25.2  21.8    Calcium 9.0  9.2      Most Recent A1C          6/2/2025    07:52   HGBA1C Most Recent   Hemoglobin A1C 5.00          Data reviewed : PCP notes        Assessment and Plan    Problem List Items Addressed This Visit    None  Visit Diagnoses         Mild episode of recurrent major depressive disorder  (Chronic)       Relevant Medications    buPROPion XL (Wellbutrin XL) 300 MG 24 hr tablet    busPIRone (BUSPAR) 5 MG tablet    traZODone (DESYREL) 50 MG tablet      Generalized anxiety disorder  (Chronic)       Relevant Medications    buPROPion XL (Wellbutrin XL) 300 MG 24 hr tablet    busPIRone (BUSPAR) 5 MG tablet    traZODone (DESYREL) 50 MG tablet      Sleep difficulties        Relevant Medications    traZODone (DESYREL) 50 MG tablet                    Encounter Diagnoses    Name Primary?    Mild episode of recurrent major depressive disorder     Generalized anxiety disorder     Sleep difficulties        I have reviewed the patient and the results are consistent with the previously documented exam by myself.  I have reviewed the following portions of the patient's ROS and PFSH and confirmed them as accurate.  The HPI has been updated, chief of complaint, ROS and subjective have been reviewed and up-to-date.  The following portions of the patient's notes were reviewed, confirmed and/or updated this visit as appropriate: History of present illness/Interval history, physical examination, assessment and plan, allergies, current medications, past family medical history, past medical history, past social history, past surgical history and problem list.        TREATMENT PLAN/GOALS: Continue supportive psychotherapy efforts and medications as indicated. Treatment and medication options discussed during today's visit. Patient ackowledged and verbally consented to continue with current treatment plan and was educated on the importance of compliance with treatment and follow-up appointments.    MEDICATION ISSUES:  We discussed risks, benefits, and side effects of the above medications and the patient was agreeable with the plan. Patient was educated on the importance of compliance with treatment and follow-up appointments.  Patient is agreeable to call the office with any worsening of symptoms or onset of side effects. Patient is agreeable to call 911 or go to the nearest ER should he/she begin having SI/HI.     - Continue BuSpar 5 mg 3 times daily for anxiety.  -Continue Wellbutrin 300 mg XL daily as adjunct for depression.  -Continue trazodone 50 mg at night for sleep as patient feels this works better for her.  -Highly encouraged patient if she had any concerns or worsening symptoms to contact the clinic for sooner appointment she verbalized understanding.       Counseled patient regarding  multimodal approach with healthy nutrition, healthy sleep, regular physical activity, social activities, counseling, and medications.      Coping skills reviewed and encouraged positive framing of thoughts     Assisted patient in processing above session content; acknowledged and normalized patient’s thoughts, feelings, and concerns.  Applied  positive coping skills and behavior management in session.  Allowed patient to freely discuss issues without interruption or judgment. Provided safe, confidential environment to facilitate the development of positive therapeutic relationship and encourage open, honest communication. Assisted patient in identifying risk factors which would indicate the need for higher level of care including thoughts to harm self or others and/or self-harming behavior and encouraged patient to contact this office, call 911, or present to the nearest emergency room should any of these events occur. Discussed crisis intervention services and means to access.     MEDS ORDERED DURING VISIT:  New Medications Ordered This Visit   Medications    buPROPion XL (Wellbutrin XL) 300 MG 24 hr tablet     Sig: Take 1 tablet by mouth Daily.     Dispense:  90 tablet     Refill:  0    busPIRone (BUSPAR) 5 MG tablet     Sig: Take 1 tablet by mouth 3 (Three) Times a Day.     Dispense:  270 tablet     Refill:  0    traZODone (DESYREL) 50 MG tablet     Sig: Take 1 tablet by mouth Every Night.     Dispense:  90 tablet     Refill:  1           Follow Up   Return in about 8 weeks (around 9/8/2025), or if symptoms worsen or fail to improve, for Recheck.    Patient was given instructions and counseling regarding her condition or for health maintenance advice. Please see specific information pulled into the AVS if appropriate.     Some of the data in this electronic note has been brought forward from a previous encounter, any necessary changes have been made, it has been reviewed by this APRN, and it is  accurate.      This document has been electronically signed by NICOLE Griffin  July 14, 2025 10:09 EDT    Part of this note may be an electronic transcription/translation of spoken language to printed text using the Dragon Dictation System.

## (undated) DEVICE — VIAL FORMLN CAP 10PCT 20ML

## (undated) DEVICE — FLEX ADVANTAGE 1500CC: Brand: FLEX ADVANTAGE

## (undated) DEVICE — GOWN ,SIRUS,NONREINFORCED 3XL: Brand: MEDLINE

## (undated) DEVICE — Device

## (undated) DEVICE — LASSO POLYPECTOMY SNARE: Brand: LASSO

## (undated) DEVICE — KT ORCA ORCAPOD DISP STRL

## (undated) DEVICE — TRAP POLYP 4CHAMBER

## (undated) DEVICE — SINGLE-USE BIOPSY FORCEPS: Brand: RADIAL JAW 4

## (undated) DEVICE — SUREFIT, DUAL DISPERSIVE ELECTRODE, CONTACT QUALITY MONITOR: Brand: SUREFIT

## (undated) DEVICE — CANN NASL CO2 TRULINK W/O2 A/

## (undated) DEVICE — THE SINGLE USE ETRAP – POLYP TRAP IS USED FOR SUCTION RETRIEVAL OF ENDOSCOPICALLY REMOVED POLYPS.: Brand: ETRAP

## (undated) DEVICE — GOWN ISOL W/THUMB UNIV BLU BX/15

## (undated) DEVICE — SINGLE-USE POLYPECTOMY SNARE: Brand: SENSATION SHORT THROW